# Patient Record
Sex: MALE | Race: WHITE | NOT HISPANIC OR LATINO | Employment: OTHER | URBAN - METROPOLITAN AREA
[De-identification: names, ages, dates, MRNs, and addresses within clinical notes are randomized per-mention and may not be internally consistent; named-entity substitution may affect disease eponyms.]

---

## 2024-05-11 ENCOUNTER — HOSPITAL ENCOUNTER (EMERGENCY)
Facility: HOSPITAL | Age: 74
Discharge: HOME | End: 2024-05-11
Attending: EMERGENCY MEDICINE
Payer: MEDICARE

## 2024-05-11 VITALS
DIASTOLIC BLOOD PRESSURE: 74 MMHG | OXYGEN SATURATION: 99 % | WEIGHT: 158 LBS | SYSTOLIC BLOOD PRESSURE: 166 MMHG | TEMPERATURE: 98.2 F | BODY MASS INDEX: 24.8 KG/M2 | RESPIRATION RATE: 18 BRPM | HEIGHT: 67 IN | HEART RATE: 77 BPM

## 2024-05-11 DIAGNOSIS — S01.81XA FACIAL LACERATION, INITIAL ENCOUNTER: Primary | ICD-10-CM

## 2024-05-11 PROCEDURE — 12011 RPR F/E/E/N/L/M 2.5 CM/<: CPT

## 2024-05-11 PROCEDURE — 99282 EMERGENCY DEPT VISIT SF MDM: CPT | Mod: 25

## 2024-05-11 PROCEDURE — 99283 EMERGENCY DEPT VISIT LOW MDM: CPT | Performed by: EMERGENCY MEDICINE

## 2024-05-11 ASSESSMENT — LIFESTYLE VARIABLES
HAVE YOU EVER FELT YOU SHOULD CUT DOWN ON YOUR DRINKING: NO
EVER HAD A DRINK FIRST THING IN THE MORNING TO STEADY YOUR NERVES TO GET RID OF A HANGOVER: NO
TOTAL SCORE: 0
EVER FELT BAD OR GUILTY ABOUT YOUR DRINKING: NO
HAVE PEOPLE ANNOYED YOU BY CRITICIZING YOUR DRINKING: NO

## 2024-05-11 ASSESSMENT — COLUMBIA-SUICIDE SEVERITY RATING SCALE - C-SSRS
1. IN THE PAST MONTH, HAVE YOU WISHED YOU WERE DEAD OR WISHED YOU COULD GO TO SLEEP AND NOT WAKE UP?: NO
2. HAVE YOU ACTUALLY HAD ANY THOUGHTS OF KILLING YOURSELF?: NO
6. HAVE YOU EVER DONE ANYTHING, STARTED TO DO ANYTHING, OR PREPARED TO DO ANYTHING TO END YOUR LIFE?: NO

## 2024-05-11 ASSESSMENT — PAIN DESCRIPTION - PROGRESSION: CLINICAL_PROGRESSION: NOT CHANGED

## 2024-05-11 ASSESSMENT — PAIN SCALES - GENERAL: PAINLEVEL_OUTOF10: 0 - NO PAIN

## 2024-05-11 ASSESSMENT — PAIN - FUNCTIONAL ASSESSMENT: PAIN_FUNCTIONAL_ASSESSMENT: 0-10

## 2024-05-12 NOTE — DISCHARGE INSTRUCTIONS
It is okay to wash the area with soap and water.  Do not use petroleum jelly, or any lotions as this will cause the surgical glue to come off.  Return to the emergency room for any increased redness, pain or swelling or signs of infection.  The glue will come off on its own and does not need to be removed.  The glue will usually remove itself or fall off within 7 to 10 days.

## 2024-05-12 NOTE — ED PROVIDER NOTES
HPI   Chief Complaint   Patient presents with    Facial Laceration     Nicked face shaving at 7:00pm  Patient cut tiny mole       This is a 74-year-old male who recently tested positive for COVID who comes to the emergency room when he cut his chin shaving.  He is on Coumadin, and the area that he cut has been bleeding and oozing all day.  Every time put on the mask, it knocks off the clot, and it starts bleeding again.  There is been no pain or swelling in the area.  He has no chest pain, no shortness of breath but has a mild cough and some mild weakness.  He does not want to be seen for his COVID as that feels fine.  He states the only reason he is here is because of the small laceration that he got from shaving on his chin.                          No data recorded                   Patient History   Past Medical History:   Diagnosis Date    Acute upper respiratory infection, unspecified 11/01/2013    Upper respiratory infection    Cough, unspecified 11/01/2013    Cough    Encounter for immunization 11/01/2013    Need for prophylactic vaccination and inoculation against influenza    Pain in unspecified shoulder 11/01/2013    Pain, joint, shoulder    Personal history of colonic polyps 04/22/2015    History of colonic polyps    Personal history of other diseases of the circulatory system 04/22/2015    History of congestive heart failure     Past Surgical History:   Procedure Laterality Date    COLONOSCOPY  11/20/2013    Colonoscopy (Fiberoptic)     No family history on file.  Social History     Tobacco Use    Smoking status: Never    Smokeless tobacco: Never   Vaping Use    Vaping status: Never Used   Substance Use Topics    Alcohol use: Never    Drug use: Never       Physical Exam   ED Triage Vitals [05/11/24 2305]   Temperature Heart Rate Respirations BP   36.8 °C (98.2 °F) 70 19 120/70      Pulse Ox Temp Source Heart Rate Source Patient Position   98 % Temporal Monitor Sitting      BP Location FiO2 (%)     Right  arm --       Physical Exam  Constitutional:       Appearance: Normal appearance.   HENT:      Head: Normocephalic.      Comments: Small 3 mm laceration vertically on the mid chin.  Oozing is controlled.  Dermabond utilized to close the wound.  Eyes:      Pupils: Pupils are equal, round, and reactive to light.   Cardiovascular:      Rate and Rhythm: Normal rate.      Pulses: Normal pulses.   Pulmonary:      Effort: Pulmonary effort is normal.      Breath sounds: Normal breath sounds.   Skin:     General: Skin is warm.   Neurological:      General: No focal deficit present.      Mental Status: He is alert.         ED Course & MDM   Diagnoses as of 05/11/24 2325   Facial laceration, initial encounter       Medical Decision Making  Dermabond was utilized to close the wound.  The patient was checked after 6 minutes, the wound is no longer bleeding and the bone has sealed.  They are instructed return to the emergency room for any worsening symptoms such as increased redness pain or swelling of the wound, or any worsening COVID symptoms such as chest pain, shortness of breath or worsening weakness or vomiting.        Procedure  Procedures     Charanjit Magdaleno DO  05/12/24 0224

## 2025-02-24 ENCOUNTER — APPOINTMENT (OUTPATIENT)
Dept: RADIOLOGY | Facility: HOSPITAL | Age: 75
End: 2025-02-24
Payer: MEDICARE

## 2025-02-24 ENCOUNTER — HOSPITAL ENCOUNTER (EMERGENCY)
Facility: HOSPITAL | Age: 75
Discharge: HOME | End: 2025-02-24
Attending: STUDENT IN AN ORGANIZED HEALTH CARE EDUCATION/TRAINING PROGRAM
Payer: MEDICARE

## 2025-02-24 VITALS
HEIGHT: 67 IN | SYSTOLIC BLOOD PRESSURE: 168 MMHG | HEART RATE: 84 BPM | BODY MASS INDEX: 22.29 KG/M2 | RESPIRATION RATE: 18 BRPM | TEMPERATURE: 98.1 F | DIASTOLIC BLOOD PRESSURE: 92 MMHG | WEIGHT: 142 LBS | OXYGEN SATURATION: 99 %
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
## 2025-02-24 LAB
ALBUMIN SERPL BCP-MCNC: 3.8 G/DL (ref 3.4–5)
ALP SERPL-CCNC: 41 U/L (ref 33–136)
ALT SERPL W P-5'-P-CCNC: 40 U/L (ref 10–52)
ANION GAP SERPL CALC-SCNC: 11 MMOL/L (ref 10–20)
APPEARANCE UR: CLEAR
APTT PPP: 28 SECONDS (ref 27–38)
AST SERPL W P-5'-P-CCNC: 37 U/L (ref 9–39)
BASOPHILS # BLD AUTO: 0.02 X10*3/UL (ref 0–0.1)
BASOPHILS NFR BLD AUTO: 0.2 %
BILIRUB SERPL-MCNC: 1 MG/DL (ref 0–1.2)
BILIRUB UR STRIP.AUTO-MCNC: NEGATIVE MG/DL
BUN SERPL-MCNC: 23 MG/DL (ref 6–23)
CALCIUM SERPL-MCNC: 9.8 MG/DL (ref 8.6–10.3)
CARDIAC TROPONIN I PNL SERPL HS: 20 NG/L (ref 0–20)
CARDIAC TROPONIN I PNL SERPL HS: 21 NG/L (ref 0–20)
CHLORIDE SERPL-SCNC: 104 MMOL/L (ref 98–107)
CO2 SERPL-SCNC: 29 MMOL/L (ref 21–32)
COLOR UR: NORMAL
CREAT SERPL-MCNC: 0.87 MG/DL (ref 0.5–1.3)
EGFRCR SERPLBLD CKD-EPI 2021: 90 ML/MIN/1.73M*2
EOSINOPHIL # BLD AUTO: 0.02 X10*3/UL (ref 0–0.4)
EOSINOPHIL NFR BLD AUTO: 0.2 %
ERYTHROCYTE [DISTWIDTH] IN BLOOD BY AUTOMATED COUNT: 13.3 % (ref 11.5–14.5)
ETHANOL SERPL-MCNC: <10 MG/DL
GLUCOSE SERPL-MCNC: 128 MG/DL (ref 74–99)
GLUCOSE UR STRIP.AUTO-MCNC: NORMAL MG/DL
HCT VFR BLD AUTO: 40.1 % (ref 41–52)
HGB BLD-MCNC: 12.9 G/DL (ref 13.5–17.5)
HOLD SPECIMEN: NORMAL
IMM GRANULOCYTES # BLD AUTO: 0.04 X10*3/UL (ref 0–0.5)
IMM GRANULOCYTES NFR BLD AUTO: 0.5 % (ref 0–0.9)
INR PPP: 1.3 (ref 0.9–1.1)
KETONES UR STRIP.AUTO-MCNC: NEGATIVE MG/DL
LEUKOCYTE ESTERASE UR QL STRIP.AUTO: NEGATIVE
LYMPHOCYTES # BLD AUTO: 1.37 X10*3/UL (ref 0.8–3)
LYMPHOCYTES NFR BLD AUTO: 15.8 %
MCH RBC QN AUTO: 31.8 PG (ref 26–34)
MCHC RBC AUTO-ENTMCNC: 32.2 G/DL (ref 32–36)
MCV RBC AUTO: 99 FL (ref 80–100)
MONOCYTES # BLD AUTO: 0.72 X10*3/UL (ref 0.05–0.8)
MONOCYTES NFR BLD AUTO: 8.3 %
NEUTROPHILS # BLD AUTO: 6.49 X10*3/UL (ref 1.6–5.5)
NEUTROPHILS NFR BLD AUTO: 75 %
NITRITE UR QL STRIP.AUTO: NEGATIVE
NRBC BLD-RTO: 0 /100 WBCS (ref 0–0)
PH UR STRIP.AUTO: 6.5 [PH]
PLATELET # BLD AUTO: 196 X10*3/UL (ref 150–450)
POTASSIUM SERPL-SCNC: 4.4 MMOL/L (ref 3.5–5.3)
PROT SERPL-MCNC: 6.1 G/DL (ref 6.4–8.2)
PROT UR STRIP.AUTO-MCNC: NEGATIVE MG/DL
PROTHROMBIN TIME: 14.4 SECONDS (ref 9.8–12.8)
RBC # BLD AUTO: 4.06 X10*6/UL (ref 4.5–5.9)
RBC # UR STRIP.AUTO: NEGATIVE MG/DL
SODIUM SERPL-SCNC: 140 MMOL/L (ref 136–145)
SP GR UR STRIP.AUTO: 1.01
UROBILINOGEN UR STRIP.AUTO-MCNC: NORMAL MG/DL
WBC # BLD AUTO: 8.7 X10*3/UL (ref 4.4–11.3)

## 2025-02-24 PROCEDURE — 82077 ASSAY SPEC XCP UR&BREATH IA: CPT | Performed by: STUDENT IN AN ORGANIZED HEALTH CARE EDUCATION/TRAINING PROGRAM

## 2025-02-24 PROCEDURE — 81003 URINALYSIS AUTO W/O SCOPE: CPT | Performed by: STUDENT IN AN ORGANIZED HEALTH CARE EDUCATION/TRAINING PROGRAM

## 2025-02-24 PROCEDURE — 99291 CRITICAL CARE FIRST HOUR: CPT | Performed by: STUDENT IN AN ORGANIZED HEALTH CARE EDUCATION/TRAINING PROGRAM

## 2025-02-24 PROCEDURE — 84484 ASSAY OF TROPONIN QUANT: CPT | Performed by: STUDENT IN AN ORGANIZED HEALTH CARE EDUCATION/TRAINING PROGRAM

## 2025-02-24 PROCEDURE — 99285 EMERGENCY DEPT VISIT HI MDM: CPT | Mod: 25

## 2025-02-24 PROCEDURE — 70450 CT HEAD/BRAIN W/O DYE: CPT

## 2025-02-24 PROCEDURE — 71045 X-RAY EXAM CHEST 1 VIEW: CPT

## 2025-02-24 PROCEDURE — 72170 X-RAY EXAM OF PELVIS: CPT | Performed by: RADIOLOGY

## 2025-02-24 PROCEDURE — 85610 PROTHROMBIN TIME: CPT | Performed by: EMERGENCY MEDICINE

## 2025-02-24 PROCEDURE — 85025 COMPLETE CBC W/AUTO DIFF WBC: CPT | Performed by: STUDENT IN AN ORGANIZED HEALTH CARE EDUCATION/TRAINING PROGRAM

## 2025-02-24 PROCEDURE — 80053 COMPREHEN METABOLIC PANEL: CPT | Performed by: STUDENT IN AN ORGANIZED HEALTH CARE EDUCATION/TRAINING PROGRAM

## 2025-02-24 PROCEDURE — 72170 X-RAY EXAM OF PELVIS: CPT

## 2025-02-24 PROCEDURE — 72125 CT NECK SPINE W/O DYE: CPT

## 2025-02-24 PROCEDURE — 70450 CT HEAD/BRAIN W/O DYE: CPT | Performed by: RADIOLOGY

## 2025-02-24 PROCEDURE — 36415 COLL VENOUS BLD VENIPUNCTURE: CPT | Performed by: STUDENT IN AN ORGANIZED HEALTH CARE EDUCATION/TRAINING PROGRAM

## 2025-02-24 PROCEDURE — 71045 X-RAY EXAM CHEST 1 VIEW: CPT | Performed by: RADIOLOGY

## 2025-02-24 ASSESSMENT — LIFESTYLE VARIABLES
EVER HAD A DRINK FIRST THING IN THE MORNING TO STEADY YOUR NERVES TO GET RID OF A HANGOVER: NO
HAVE YOU EVER FELT YOU SHOULD CUT DOWN ON YOUR DRINKING: NO
HAVE PEOPLE ANNOYED YOU BY CRITICIZING YOUR DRINKING: NO
TOTAL SCORE: 0
EVER FELT BAD OR GUILTY ABOUT YOUR DRINKING: NO

## 2025-02-24 ASSESSMENT — PAIN DESCRIPTION - LOCATION: LOCATION: NECK

## 2025-02-24 ASSESSMENT — COLUMBIA-SUICIDE SEVERITY RATING SCALE - C-SSRS
2. HAVE YOU ACTUALLY HAD ANY THOUGHTS OF KILLING YOURSELF?: NO
6. HAVE YOU EVER DONE ANYTHING, STARTED TO DO ANYTHING, OR PREPARED TO DO ANYTHING TO END YOUR LIFE?: NO
1. IN THE PAST MONTH, HAVE YOU WISHED YOU WERE DEAD OR WISHED YOU COULD GO TO SLEEP AND NOT WAKE UP?: NO

## 2025-02-24 ASSESSMENT — PAIN - FUNCTIONAL ASSESSMENT: PAIN_FUNCTIONAL_ASSESSMENT: 0-10

## 2025-02-24 ASSESSMENT — PAIN SCALES - GENERAL
PAINLEVEL_OUTOF10: 0 - NO PAIN
PAINLEVEL_OUTOF10: 0 - NO PAIN
PAINLEVEL_OUTOF10: 5 - MODERATE PAIN

## 2025-02-24 NOTE — ED PROVIDER NOTES
EMERGENCY DEPARTMENT ENCOUNTER      Pt Name: Nahid Gastelum  MRN: [id number]  Birthdate 1950  Date of evaluation: 2/24/2025  Provider: Leander Jeter DO    CHIEF COMPLAINT       Chief Complaint   Patient presents with    Fall     Pt slipped and fell while getting out of shower, denies head/neck injury. Reports weakness for several days     HISTORY OF PRESENT ILLNESS    Nahid Gastelum is a 74 y.o. year old male who presents to the ER for fall 1 Coumadin.  He reports that he has had 1 week of increased urinary urgency, weakness without dysuria.  Today after out of bed he felt generally weak, reports falling by slipping on ice and landing on his butt, he did not hit his head or pass out.  He is not experiencing chest pain, shortness of breath, abdominal pain, neck pain, pain to his arms or legs.  EMS did report he had report of neck pain, placed him in cervical collar prior to arrival.  PMH A-fib on Coumadin     PAST MEDICAL HISTORY     Past Medical History:   Diagnosis Date    Acute upper respiratory infection, unspecified 11/01/2013    Upper respiratory infection    Cough, unspecified 11/01/2013    Cough    Encounter for immunization 11/01/2013    Need for prophylactic vaccination and inoculation against influenza    Pain in unspecified shoulder 11/01/2013    Pain, joint, shoulder    Personal history of colonic polyps 04/22/2015    History of colonic polyps    Personal history of other diseases of the circulatory system 04/22/2015    History of congestive heart failure     CURRENT MEDICATIONS       Previous Medications    No medications on file     SURGICAL HISTORY       Past Surgical History:   Procedure Laterality Date    COLONOSCOPY  11/20/2013    Colonoscopy (Fiberoptic)     ALLERGIES     Sulfamethoxazole and Sulfamethoxazole-trimethoprim  FAMILY HISTORY     No family history on file.  SOCIAL HISTORY       Social History     Tobacco Use    Smoking status: Never    Smokeless tobacco: Never   Vaping Use     Vaping status: Never Used   Substance Use Topics    Alcohol use: Never    Drug use: Never     PHYSICAL EXAM  (up to 7 for level 4, 8 or more for level 5)     ED Triage Vitals   Temp Pulse Resp BP   -- -- -- --      SpO2 Temp src Heart Rate Source Patient Position   -- -- -- --      BP Location FiO2 (%)     -- --       Physical Exam  Vitals and nursing note reviewed.   Constitutional:       General: He is not in acute distress.     Appearance: Normal appearance. He is not ill-appearing, toxic-appearing or diaphoretic.   HENT:      Head: Normocephalic and atraumatic. No raccoon eyes, Paulino's sign, contusion or laceration.      Jaw: No trismus or malocclusion.      Right Ear: External ear normal.      Left Ear: External ear normal.      Mouth/Throat:      Mouth: Mucous membranes are moist.      Pharynx: Oropharynx is clear.   Eyes:      Extraocular Movements: Extraocular movements intact.      Pupils: Pupils are equal, round, and reactive to light.   Neck:      Trachea: No tracheal deviation.   Cardiovascular:      Rate and Rhythm: Normal rate and regular rhythm.      Pulses: Normal pulses.           Radial pulses are 2+ on the right side and 2+ on the left side.        Dorsalis pedis pulses are 2+ on the right side and 2+ on the left side.   Pulmonary:      Effort: Pulmonary effort is normal. No respiratory distress.      Breath sounds: No stridor. No wheezing, rhonchi or rales.   Chest:      Chest wall: No tenderness.   Abdominal:      General: Abdomen is flat.      Palpations: Abdomen is soft. There is no mass.      Tenderness: There is no abdominal tenderness. There is no guarding or rebound.   Musculoskeletal:         General: No tenderness or signs of injury.      Right shoulder: No deformity or tenderness. Normal range of motion.      Left shoulder: No deformity or tenderness. Normal range of motion.      Right upper arm: No deformity or tenderness.      Left upper arm: No deformity or tenderness.      Right  elbow: No deformity. Normal range of motion. No tenderness.      Left elbow: No deformity. Normal range of motion. No tenderness.      Right forearm: No deformity or tenderness.      Left forearm: No deformity or tenderness.      Right wrist: No deformity, tenderness or snuff box tenderness. Normal range of motion.      Left wrist: No deformity, tenderness or snuff box tenderness. Normal range of motion.      Right hand: No tenderness. Normal range of motion.      Left hand: No tenderness. Normal range of motion.      Cervical back: Neck supple. No deformity or tenderness.      Thoracic back: No deformity or tenderness.      Lumbar back: No deformity or tenderness.      Right hip: No deformity or tenderness. Normal range of motion.      Left hip: No deformity or tenderness. Normal range of motion.      Right upper leg: No deformity or tenderness.      Left upper leg: No deformity or tenderness.      Right knee: No deformity. Normal range of motion. No tenderness.      Left knee: No deformity. Normal range of motion. No tenderness.      Right lower leg: No deformity or tenderness. No edema.      Left lower leg: No deformity or tenderness. No edema.      Right ankle: No deformity. No tenderness.      Left ankle: No deformity. No tenderness.      Right foot: Normal range of motion. No deformity or tenderness.      Left foot: Normal range of motion. No deformity or tenderness.   Skin:     General: Skin is warm and dry.      Capillary Refill: Capillary refill takes less than 2 seconds.      Coloration: Skin is not jaundiced or pale.      Findings: No bruising, lesion, petechiae, rash or wound.   Neurological:      General: No focal deficit present.      Mental Status: He is alert.      Cranial Nerves: No cranial nerve deficit.      Sensory: No sensory deficit.      Motor: No weakness.      Coordination: Coordination normal.        DIAGNOSTIC RESULTS   LABS:  Labs Reviewed   CBC WITH AUTO DIFFERENTIAL - Abnormal        Result Value    WBC 8.7      nRBC 0.0      RBC 4.06 (*)     Hemoglobin 12.9 (*)     Hematocrit 40.1 (*)     MCV 99      MCH 31.8      MCHC 32.2      RDW 13.3      Platelets 196      Neutrophils % 75.0      Immature Granulocytes %, Automated 0.5      Lymphocytes % 15.8      Monocytes % 8.3      Eosinophils % 0.2      Basophils % 0.2      Neutrophils Absolute 6.49 (*)     Immature Granulocytes Absolute, Automated 0.04      Lymphocytes Absolute 1.37      Monocytes Absolute 0.72      Eosinophils Absolute 0.02      Basophils Absolute 0.02     COMPREHENSIVE METABOLIC PANEL - Abnormal    Glucose 128 (*)     Sodium 140      Potassium 4.4      Chloride 104      Bicarbonate 29      Anion Gap 11      Urea Nitrogen 23      Creatinine 0.87      eGFR 90      Calcium 9.8      Albumin 3.8      Alkaline Phosphatase 41      Total Protein 6.1 (*)     AST 37      Bilirubin, Total 1.0      ALT 40     TROPONIN I, HIGH SENSITIVITY - Abnormal    Troponin I, High Sensitivity 21 (*)     Narrative:     Less than 99th percentile of normal range cutoff-  Female and children under 18 years old <14 ng/L; Male <21 ng/L: Negative  Repeat testing should be performed if clinically indicated.     Female and children under 18 years old 14-50 ng/L; Male 21-50 ng/L:  Consistent with possible cardiac damage and possible increased clinical   risk. Serial measurements may help to assess extent of myocardial damage.     >50 ng/L: Consistent with cardiac damage, increased clinical risk and  myocardial infarction. Serial measurements may help assess extent of   myocardial damage.      NOTE: Children less than 1 year old may have higher baseline troponin   levels and results should be interpreted in conjunction with the overall   clinical context.     NOTE: Troponin I testing is performed using a different   testing methodology at [facility] than at other   [facility] hospitals. Direct result comparisons should only   be made within the same method.    COAGULATION SCREEN - Abnormal    Protime 14.4 (*)     INR 1.3 (*)     aPTT 28      Narrative:     The APTT is no longer used for monitoring Unfractionated Heparin Therapy. For monitoring Heparin Therapy, use the Heparin Assay.   ALCOHOL - Normal    Alcohol <10     URINALYSIS WITH REFLEX CULTURE AND MICROSCOPIC - Normal    Color, Urine Light-Yellow      Appearance, Urine Clear      Specific Gravity, Urine 1.012      pH, Urine 6.5      Protein, Urine NEGATIVE      Glucose, Urine Normal      Blood, Urine NEGATIVE      Ketones, Urine NEGATIVE      Bilirubin, Urine NEGATIVE      Urobilinogen, Urine Normal      Nitrite, Urine NEGATIVE      Leukocyte Esterase, Urine NEGATIVE     TROPONIN I, HIGH SENSITIVITY - Normal    Troponin I, High Sensitivity 20      Narrative:     Less than 99th percentile of normal range cutoff-  Female and children under 18 years old <14 ng/L; Male <21 ng/L: Negative  Repeat testing should be performed if clinically indicated.     Female and children under 18 years old 14-50 ng/L; Male 21-50 ng/L:  Consistent with possible cardiac damage and possible increased clinical   risk. Serial measurements may help to assess extent of myocardial damage.     >50 ng/L: Consistent with cardiac damage, increased clinical risk and  myocardial infarction. Serial measurements may help assess extent of   myocardial damage.      NOTE: Children less than 1 year old may have higher baseline troponin   levels and results should be interpreted in conjunction with the overall   clinical context.     NOTE: Troponin I testing is performed using a different   testing methodology at [facility] than at other   [facility]. Direct result comparisons should only   be made within the same method.   URINALYSIS WITH REFLEX CULTURE AND MICROSCOPIC    Narrative:     The following orders were created for panel order Urinalysis with Reflex Culture and Microscopic.  Procedure                                Abnormality         Status                     ---------                               -----------         ------                     Urinalysis with Reflex C...[[id number]]  Normal              Final result               Extra Urine Gray Tube[[id number]]                            In process                   Please view results for these tests on the individual orders.   EXTRA URINE GRAY TUBE     All other labs were within normal range or not returned as of this dictation.  Imaging  XR chest 1 view   Final Result   Cardiomegaly                  MACRO:   None        Signed by: Catrachita Ferro 2/24/2025 9:37 AM   Dictation workstation:   [id number]      XR pelvis 1-2 views   Final Result   No acute fracture or dislocation.        Degenerative changes of bilateral hips.        Degenerative changes of bilateral SI joints and lower lumbar spine.             MACRO:   None        Signed by: Catrachita Ferro 2/24/2025 9:31 AM   Dictation workstation:   [id number]      CT head W O contrast trauma protocol   Final Result   No evidence of an acute intracranial process.        MACRO:   None.        Signed by: Fred Huitron 2/24/2025 7:51 AM   Dictation workstation:   [id number]      CT cervical spine wo IV contrast   Final Result   No evidence of an acute fracture or subluxation.  Degenerative   changes.        MACRO:   None.        Signed by: Fred Huitron 2/24/2025 7:53 AM   Dictation workstation:   [id number]         Procedure  Critical Care    Performed by: Mejia Cabrera DO  Authorized by: Mejia Cabrera DO    Critical care provider statement:     Critical care time (minutes):  35    Critical care time was exclusive of:  Separately billable procedures and treating other patients and teaching time    Critical care was necessary to treat or prevent imminent or life-threatening deterioration of the following conditions:  Trauma    Critical care was time spent personally by me on the following activities:  Ordering and performing  "treatments and interventions, ordering and review of laboratory studies, ordering and review of radiographic studies, pulse oximetry, re-evaluation of patient's condition, review of old charts, obtaining history from patient or surrogate, evaluation of patient's response to treatment, development of treatment plan with patient or surrogate and examination of patient    EMERGENCY DEPARTMENT COURSE/MDM:   Medical Decision Making    Vitals:    Vitals:    02/24/25 0725 02/24/25 0748 02/24/25 0800 02/24/25 0907   BP: 92/61 (!) 204/81 (!) 166/93 (!) 168/92   BP Location: Right arm Right arm  Right arm   Patient Position: Lying Sitting  Sitting   Pulse: (!) 101 98 90 84   Resp: 18 18  18   Temp: 36.7 °C (98.1 °F)      TempSrc: Temporal      SpO2: 100% 99% 98% 99%   Weight: 64.4 kg (142 lb)      Height: 1.702 m (5' 7\")        Nahid Gastelum is a male 74 y.o. who presents to the ER for fall on Coumadin. On arrival the patients vital signs were: Afebrile, Tachycardic, Normotensive, Regular RR, and Normoxic on room air. History obtained from: patient.  Patient reported neck pain to EMS, collar was placed by them prior to arrival.  Arrived per QB as head impact on anticoagulation, on arrival primary survey intact, secondary survey did not have any sites of tenderness so basic per trauma protocol started including CBC, CMP, troponin, alcohol, CT head and C-spine, chest and pelvis x-ray.  Given patient is on Coumadin, INR checked.  Given reported weakness, increased urinary urgency plan for UA as well.  No open deformed injuries, no skin breakage, tetanus shot not indicated.  Patient not reporting pain, analgesia deferred.    Patient will be discharged home after negative traumatic injury workup.  Subtherapeutic INR which I advised patient to continue taking his Coumadin at home and have a recheck by his outpatient team within the next week.  Do not believe the dosage needs to be changed as he just recently restarted the " medication.  No sign of infection in the urine.  Patient made aware of the possibility of concussion although he is not currently exhibiting any signs of TBI.  Discharged in stable condition with PCP follow-up the patient does feel able to return home and take care of himself.    ED Course as of 02/24/25 1215   Mon Feb 24, 2025   0730 Nursing advised to stand down on trauma activation as patient denies hitting head and EMS denies any report of patient having had a head injury. [TL]   0756 Subtherapeutic INR in the setting of recently holding Coumadin for procedure. [TL]   0948 No acute traumatic injury noted on radiology.  Repeat troponin to be obtained given borderline troponin of 21.  If not uptrending I do believe the patient would likely be appropriate for discharge home if he is able to ambulate. [TL]   1054 Comprehensive Metabolic Panel(!)  Normoglycemic, no acute electrolyte or hepatorenal abnormality [CB]   1054 Troponin I, High Sensitivity(!): 21  Elevated, will repeat [CB]   1054 Urinalysis with Reflex Culture and Microscopic  No bacteria, nitrite, or leukocyte esterase to suggest UTI. No ketonuria. No hematuria. No glucosuria. [CB]   1054 Alcohol: <10  Sober [CB]   1054 CBC and Auto Differential(!)  Normocytic anemia, no acute leukocytosis leukopenia thrombocytosis or thrombocytopenia [CB]   1054 CT head W O contrast trauma protocol  No evidence of an acute intracranial process. [CB]   1055 CT cervical spine wo IV contrast  No evidence of an acute fracture or subluxation.  Degenerative  changes   [CB]   1055 XR chest 1 view  Cardiomegaly [CB]   1055 XR pelvis 1-2 views  No acute fracture or dislocation.      Degenerative changes of bilateral hips.      Degenerative changes of bilateral SI joints and lower lumbar spine.   [CB]   1118 Passed road test [CB]   1134 Repeat troponin within normal limits. [TL]   1214 Troponin I, High Sensitivity: 20  Not elevated up to 15 points relative to initial troponin,  doubt doubt acs or myopericarditis [CB]      ED Course User Index  [CB] Leander Jteer DO  [TL] Mejia Cabrera DO         Diagnoses as of 02/24/25 1215   Fall, initial encounter       External Records Reviewed: I reviewed recent and relevant outside records including inpatient notes, outpatient records      Shared decision making for disposition  Patient and/or patient´s representative was counseled regarding labs, imaging, likely diagnosis. All questions were answered. Recommendation was made   for discharge home. The patient agreed and was discharged home in stable condition with appropriate relevant educational materials. Return precautions were provided which included worsening headache, vomiting, fever of 38C (100.4) or higher, vision changes, confusion, loss of motion or feeling in your arms or legs, loss of control of your urine or stool, neck pain, fainting, seizure, or any new or worsening symptoms. .       Follow-up:  Manpreet Perez MD  [address] [facility] [address]  [facility] [id number] [phone]              Final Impression:   1. Fall, initial encounter          Please excuse any misspellings or unintended errors related to the Dragon speech recognition software used to dictate this note.    I reviewed the case with the attending ED physician. The attending ED physician agrees with the plan.      Leander Jeter DO  Resident  02/24/25 1120       Leander Jeter DO  Resident  02/24/25 1215    I performed a history and physical examination of the patient and discussed his management with the resident physician.  I agree with the history, physical, assessment, and plan of care, with the following exceptions:   None    I was present for key and critical portions of the following procedures: Critical care  Time Spent in Critical Care of the patient: 35  Time spent in discussions with the patient and family: 30    DO Mejia Oneal DO  02/24/25 8123

## 2025-02-24 NOTE — DISCHARGE INSTRUCTIONS
Please return to the ER or seek immediate medical attention if you experience worsening headache, vomiting, fever of 38C (100.4) or higher, vision changes, confusion, loss of motion or feeling in your arms or legs, loss of control of your urine or stool, neck pain, fainting, seizure, or any new or worsening symptoms.     You are welcome back any time. Thank you for entrusting your care to us, I hope we made your visit as pleasant as possible. Wishing you well!    Dr. Jeter

## 2025-03-10 ENCOUNTER — TELEPHONE (OUTPATIENT)
Dept: NEUROSURGERY | Facility: HOSPITAL | Age: 75
End: 2025-03-10

## 2025-03-10 ENCOUNTER — APPOINTMENT (OUTPATIENT)
Dept: NEUROSURGERY | Facility: CLINIC | Age: 75
End: 2025-03-10
Payer: MEDICARE

## 2025-03-10 VITALS
HEIGHT: 67 IN | RESPIRATION RATE: 18 BRPM | SYSTOLIC BLOOD PRESSURE: 104 MMHG | BODY MASS INDEX: 22.28 KG/M2 | WEIGHT: 141.98 LBS | HEART RATE: 82 BPM | DIASTOLIC BLOOD PRESSURE: 66 MMHG

## 2025-03-10 DIAGNOSIS — S06.9XAA HYDROCEPHALUS EX VACUO DUE TO BRAIN INJURY (MULTI): ICD-10-CM

## 2025-03-10 DIAGNOSIS — G91.3 HYDROCEPHALUS EX VACUO DUE TO BRAIN INJURY (MULTI): ICD-10-CM

## 2025-03-10 DIAGNOSIS — G91.2 NPH (NORMAL PRESSURE HYDROCEPHALUS) (MULTI): Primary | ICD-10-CM

## 2025-03-10 DIAGNOSIS — G91.2 (IDIOPATHIC) NORMAL PRESSURE HYDROCEPHALUS (MULTI): ICD-10-CM

## 2025-03-10 PROCEDURE — 99202 OFFICE O/P NEW SF 15 MIN: CPT | Performed by: NEUROLOGICAL SURGERY

## 2025-03-10 PROCEDURE — 1126F AMNT PAIN NOTED NONE PRSNT: CPT | Performed by: NEUROLOGICAL SURGERY

## 2025-03-10 RX ORDER — DONEPEZIL HYDROCHLORIDE 10 MG/1
TABLET, FILM COATED ORAL
COMMUNITY
Start: 2024-02-27

## 2025-03-10 RX ORDER — TORSEMIDE 10 MG/1
1 TABLET ORAL DAILY
COMMUNITY
Start: 2023-12-05

## 2025-03-10 RX ORDER — MINOCYCLINE HYDROCHLORIDE 100 MG/1
CAPSULE ORAL
COMMUNITY
Start: 2016-08-10

## 2025-03-10 RX ORDER — TAMSULOSIN HYDROCHLORIDE 0.4 MG/1
0.4 CAPSULE ORAL
COMMUNITY
Start: 2023-11-21

## 2025-03-10 RX ORDER — OMEPRAZOLE 20 MG/1
1 TABLET, DELAYED RELEASE ORAL DAILY
COMMUNITY
Start: 2013-11-20

## 2025-03-10 RX ORDER — WARFARIN 4 MG/1
4 TABLET ORAL
COMMUNITY
Start: 2013-11-20

## 2025-03-10 ASSESSMENT — ENCOUNTER SYMPTOMS
BACK PAIN: 1
CONFUSION: 1
CARDIOVASCULAR NEGATIVE: 1
LIGHT-HEADEDNESS: 1
RESPIRATORY NEGATIVE: 1
DEPRESSION: 1
NECK PAIN: 1
OCCASIONAL FEELINGS OF UNSTEADINESS: 1
WEAKNESS: 1
DIARRHEA: 1
ALLERGIC/IMMUNOLOGIC NEGATIVE: 1
EYES NEGATIVE: 1
DIZZINESS: 1
BRUISES/BLEEDS EASILY: 1
LOSS OF SENSATION IN FEET: 0
FATIGUE: 1
FREQUENCY: 1
ENDOCRINE NEGATIVE: 1

## 2025-03-10 ASSESSMENT — PAIN SCALES - GENERAL: PAINLEVEL_OUTOF10: 0-NO PAIN

## 2025-03-10 NOTE — TELEPHONE ENCOUNTER
Pt was seen today and referred for NPH testing.  When they called to make an appt, they were told that he needs a CT scan prior to appt.  Please put in order and inform pt.

## 2025-03-10 NOTE — PROGRESS NOTES
"Having memory loss, urinary incontinent and unsteady gait. This has been since Oct. Has fallen 5 times. Also shaking.     74-year-old man presents for evaluation of cerebral ventriculomegaly.  He has had 6 months of worsening balance and memory problems.  He has also had urinary incontinence.  He has fallen multiple times.  He has been ambulating with a walker.  The patient is also developed a fine tremor.    Review of Systems   Constitutional:  Positive for fatigue.   HENT: Negative.     Eyes: Negative.    Respiratory: Negative.     Cardiovascular: Negative.    Gastrointestinal:  Positive for diarrhea.   Endocrine: Negative.    Genitourinary:  Positive for frequency and urgency.   Musculoskeletal:  Positive for back pain, gait problem and neck pain.   Skin: Negative.    Allergic/Immunologic: Negative.    Neurological:  Positive for dizziness, weakness and light-headedness.   Hematological:  Bruises/bleeds easily.   Psychiatric/Behavioral:  Positive for confusion.        Visit Vitals  /66 (BP Location: Left arm, Patient Position: Sitting, BP Cuff Size: Adult)   Pulse 82   Resp 18   Ht 1.702 m (5' 7\")   Wt 64.4 kg (141 lb 15.6 oz)   BMI 22.24 kg/m²   Smoking Status Never   BSA 1.74 m²         No current outpatient medications on file.      Objective   Neurological Exam    On physical examination, the patient is alert and interactive.  His language comprehension production are intact.  Extract movements are intact.  Face moves symmetrically.  He moves all extremities symmetrically.  An MRI of the right that I personally reviewed    Demonstrates moderate diffuse atrophy and ventriculomegaly.    The patient's clinical presentation and imaging may be consistent with normal pressure hydrocephalus.  To address this, I have offered the patient a high-volume lumbar puncture with neuropsychological testing to see if removal of spinal fluid improves his condition.  If it does, he may be a candidate for surgery for " placement of a permanent shunt.  The patient will need to stop his warfarin prior to the testing.

## 2025-03-19 ENCOUNTER — HOSPITAL ENCOUNTER (OUTPATIENT)
Dept: RADIOLOGY | Facility: HOSPITAL | Age: 75
Discharge: HOME | End: 2025-03-19
Payer: MEDICARE

## 2025-03-19 DIAGNOSIS — G91.2 NPH (NORMAL PRESSURE HYDROCEPHALUS) (MULTI): ICD-10-CM

## 2025-03-19 PROCEDURE — 70450 CT HEAD/BRAIN W/O DYE: CPT

## 2025-04-07 LAB
NON-UH HIE APTT PATIENT: 32.9 SECONDS (ref 26–36)
NON-UH HIE HCT: 38.1 % (ref 41–52)
NON-UH HIE HGB: 12.7 G/DL (ref 13.5–17.5)
NON-UH HIE INR: 1.2
NON-UH HIE INSTR WBC ND: 5.3
NON-UH HIE MCH: 32.1 PG (ref 27–34)
NON-UH HIE MCHC: 33.2 G/DL (ref 32–37)
NON-UH HIE MCV: 96.6 FL (ref 80–100)
NON-UH HIE MPV: 7.5 FL (ref 7.4–10.4)
NON-UH HIE PLATELET: 213 X10 (ref 150–450)
NON-UH HIE PROTIME PATIENT: 13.2 SECONDS (ref 9.8–12.4)
NON-UH HIE RBC: 3.94 X10 (ref 4.7–6.1)
NON-UH HIE RDW: 14 % (ref 11.5–14.5)
NON-UH HIE WBC: 5.3 X10 (ref 4.5–11)

## 2025-04-09 ENCOUNTER — HOSPITAL ENCOUNTER (OUTPATIENT)
Dept: RADIOLOGY | Facility: HOSPITAL | Age: 75
Discharge: HOME | End: 2025-04-09
Payer: MEDICARE

## 2025-04-09 ENCOUNTER — APPOINTMENT (OUTPATIENT)
Dept: PSYCHOLOGY | Facility: HOSPITAL | Age: 75
End: 2025-04-09
Payer: MEDICARE

## 2025-04-09 ENCOUNTER — CLINICAL SUPPORT (OUTPATIENT)
Dept: PSYCHOLOGY | Facility: HOSPITAL | Age: 75
End: 2025-04-09
Payer: MEDICARE

## 2025-04-09 VITALS
HEART RATE: 72 BPM | OXYGEN SATURATION: 100 % | SYSTOLIC BLOOD PRESSURE: 162 MMHG | RESPIRATION RATE: 16 BRPM | DIASTOLIC BLOOD PRESSURE: 90 MMHG

## 2025-04-09 DIAGNOSIS — R26.9 GAIT DISTURBANCE: ICD-10-CM

## 2025-04-09 DIAGNOSIS — N39.41 URGE INCONTINENCE OF URINE: ICD-10-CM

## 2025-04-09 DIAGNOSIS — G91.2 NPH (NORMAL PRESSURE HYDROCEPHALUS) (MULTI): ICD-10-CM

## 2025-04-09 DIAGNOSIS — G93.89 CEREBRAL VENTRICULOMEGALY: ICD-10-CM

## 2025-04-09 DIAGNOSIS — R35.0 URINARY FREQUENCY: ICD-10-CM

## 2025-04-09 DIAGNOSIS — R41.3 MEMORY DEFICIT: Primary | ICD-10-CM

## 2025-04-09 DIAGNOSIS — R39.15 URINARY URGENCY: ICD-10-CM

## 2025-04-09 DIAGNOSIS — R41.843 PSYCHOMOTOR DEFICIT: ICD-10-CM

## 2025-04-09 LAB
APPEARANCE CSF: CLEAR
APPEARANCE CSF: CLEAR
BASOPHILS NFR CSF MANUAL: 0 %
BASOPHILS NFR CSF MANUAL: 0 %
BLASTS CSF MANUAL: 0 %
BLASTS CSF MANUAL: 0 %
COLOR CSF: COLORLESS
COLOR CSF: COLORLESS
COLOR SPUN CSF: COLORLESS
COLOR SPUN CSF: COLORLESS
EOSINOPHIL NFR CSF MANUAL: 0 %
EOSINOPHIL NFR CSF MANUAL: 0 %
GLUCOSE CSF-MCNC: 72 MG/DL (ref 40–70)
IMM GRANULOCYTES NFR CSF: 0 %
IMM GRANULOCYTES NFR CSF: 0 %
LYMPHOCYTES NFR CSF MANUAL: 38 % (ref 28–96)
LYMPHOCYTES NFR CSF MANUAL: 77 % (ref 28–96)
MONOS+MACROS NFR CSF MANUAL: 23 % (ref 16–56)
MONOS+MACROS NFR CSF MANUAL: 63 % (ref 16–56)
NEUTS SEG NFR CSF MANUAL: 0 % (ref 0–5)
NEUTS SEG NFR CSF MANUAL: 0 % (ref 0–5)
OTHER CELLS NFR CSF MANUAL: 0 %
OTHER CELLS NFR CSF MANUAL: 0 %
PLASMA CELLS NFR CSF MICRO: 0 %
PLASMA CELLS NFR CSF MICRO: 0 %
PROT CSF-MCNC: 36 MG/DL (ref 15–45)
RBC # CSF AUTO: 120 /UL (ref 0–5)
RBC # CSF AUTO: 20 /UL (ref 0–5)
TOTAL CELLS COUNTED CSF: 22
TOTAL CELLS COUNTED CSF: 8
TUBE # CSF: ABNORMAL
TUBE # CSF: ABNORMAL
WBC # CSF AUTO: 1 /UL (ref 1–5)
WBC # CSF AUTO: 1 /UL (ref 1–5)

## 2025-04-09 PROCEDURE — 7100000009 HC PHASE TWO TIME - INITIAL BASE CHARGE

## 2025-04-09 PROCEDURE — 96138 PSYCL/NRPSYC TECH 1ST: CPT | Performed by: CLINICAL NEUROPSYCHOLOGIST

## 2025-04-09 PROCEDURE — 96133 NRPSYC TST EVAL PHYS/QHP EA: CPT | Mod: AH | Performed by: CLINICAL NEUROPSYCHOLOGIST

## 2025-04-09 PROCEDURE — 96116 NUBHVL XM PHYS/QHP 1ST HR: CPT | Mod: AH | Performed by: CLINICAL NEUROPSYCHOLOGIST

## 2025-04-09 PROCEDURE — 96132 NRPSYC TST EVAL PHYS/QHP 1ST: CPT | Mod: AH | Performed by: CLINICAL NEUROPSYCHOLOGIST

## 2025-04-09 PROCEDURE — 96133 NRPSYC TST EVAL PHYS/QHP EA: CPT | Performed by: CLINICAL NEUROPSYCHOLOGIST

## 2025-04-09 PROCEDURE — 7100000010 HC PHASE TWO TIME - EACH INCREMENTAL 1 MINUTE

## 2025-04-09 PROCEDURE — 62328 DX LMBR SPI PNXR W/FLUOR/CT: CPT

## 2025-04-09 PROCEDURE — 96138 PSYCL/NRPSYC TECH 1ST: CPT | Mod: AH | Performed by: CLINICAL NEUROPSYCHOLOGIST

## 2025-04-09 PROCEDURE — 2500000001 HC RX 250 WO HCPCS SELF ADMINISTERED DRUGS (ALT 637 FOR MEDICARE OP): Performed by: STUDENT IN AN ORGANIZED HEALTH CARE EDUCATION/TRAINING PROGRAM

## 2025-04-09 PROCEDURE — 96116 NUBHVL XM PHYS/QHP 1ST HR: CPT | Performed by: CLINICAL NEUROPSYCHOLOGIST

## 2025-04-09 PROCEDURE — 96139 PSYCL/NRPSYC TST TECH EA: CPT | Mod: AH | Performed by: CLINICAL NEUROPSYCHOLOGIST

## 2025-04-09 PROCEDURE — 89051 BODY FLUID CELL COUNT: CPT | Performed by: NEUROLOGICAL SURGERY

## 2025-04-09 PROCEDURE — 2500000004 HC RX 250 GENERAL PHARMACY W/ HCPCS (ALT 636 FOR OP/ED): Performed by: NEUROLOGICAL SURGERY

## 2025-04-09 PROCEDURE — 96139 PSYCL/NRPSYC TST TECH EA: CPT | Performed by: CLINICAL NEUROPSYCHOLOGIST

## 2025-04-09 PROCEDURE — 87070 CULTURE OTHR SPECIMN AEROBIC: CPT | Performed by: NEUROLOGICAL SURGERY

## 2025-04-09 PROCEDURE — 82945 GLUCOSE OTHER FLUID: CPT | Performed by: NEUROLOGICAL SURGERY

## 2025-04-09 PROCEDURE — 96132 NRPSYC TST EVAL PHYS/QHP 1ST: CPT | Performed by: CLINICAL NEUROPSYCHOLOGIST

## 2025-04-09 PROCEDURE — 62328 DX LMBR SPI PNXR W/FLUOR/CT: CPT | Performed by: RADIOLOGY

## 2025-04-09 RX ORDER — LIDOCAINE HYDROCHLORIDE 10 MG/ML
2 INJECTION, SOLUTION EPIDURAL; INFILTRATION; INTRACAUDAL; PERINEURAL ONCE
Status: COMPLETED | OUTPATIENT
Start: 2025-04-09 | End: 2025-04-09

## 2025-04-09 RX ORDER — ACETAMINOPHEN 325 MG/1
650 TABLET ORAL ONCE
Status: DISCONTINUED | OUTPATIENT
Start: 2025-04-09 | End: 2025-04-09

## 2025-04-09 RX ORDER — ACETAMINOPHEN 325 MG/1
975 TABLET ORAL ONCE
Status: COMPLETED | OUTPATIENT
Start: 2025-04-09 | End: 2025-04-09

## 2025-04-09 RX ADMIN — LIDOCAINE HYDROCHLORIDE 20 MG: 10 INJECTION, SOLUTION EPIDURAL; INFILTRATION; INTRACAUDAL; PERINEURAL at 11:06

## 2025-04-09 RX ADMIN — ACETAMINOPHEN 975 MG: 325 TABLET, FILM COATED ORAL at 11:30

## 2025-04-09 ASSESSMENT — PAIN SCALES - GENERAL
PAINLEVEL_OUTOF10: 0 - NO PAIN
PAINLEVEL_OUTOF10: 5 - MODERATE PAIN

## 2025-04-09 ASSESSMENT — PAIN - FUNCTIONAL ASSESSMENT: PAIN_FUNCTIONAL_ASSESSMENT: 0-10

## 2025-04-09 ASSESSMENT — PAIN DESCRIPTION - DESCRIPTORS: DESCRIPTORS: ACHING

## 2025-04-09 NOTE — POST-PROCEDURE NOTE
Neuroradiology Post-Procedure Note    Procedure Details:  The L3-L4 vertebral space marked under fluoroscopy, with note made of L3-L5 laminectomy. Patient was prepped and draped in the usual sterile fashion. 2 ml 1% lidocaine injected for local anesthesia. Under direct fluoroscopic guidance, a lumbar puncture was performed at the L3-L4 interspace using a 20g spinal needle. A total of 40 ml of clear CSF was collected in 4 tubes. Opening pressure was 8 cm H2O. The collected CSF was then sent to the lab for appropriate lab tests as ordered by the referring physician. Hemostasis was achieved with direct pressure. The patient tolerated procedure well without any immediate or clinically apparent complications. See PACS for full procedural report.     Patient Tolerance: good  Complications: None    Indication for procedure: The encounter diagnosis was NPH (normal pressure hydrocephalus) (Multi).    Pre-Procedure Verification and Time Out:  · Procedure Location procedure area   · HUDDLE - Pre-procedure Verification completed   · TIME OUT - Final Verification completed immediately prior to procedure start   · DEBRIEF completed     General Information:  Date/Time of Procedure: 04/09/25 at 10:38 AM  Indication(s): NPH  Findings: See PACS  Procedure performed by:  Dr. Manpreet Fournier    Assistant(s): Annabelle Rousseau MD  Estimated Blood Loss (mL): none  Specimen: Yes, 40 mL CSF  Informed Consent: written consent obtained    Prep:  Ultrasound Guided Insertion: No  Hand Hygiene, Surgical Cap, Surgical Mask, Sterile Gloves, and Drape  Patient Position:  Prone/Right Lateral Decubitus  Site Prep: betadine, draped, usual sterile procedure followed    Anesthesia/Medications:  Procedural Sedation:  Medications (Filter: Administrations occurring from 1038 to 1038 on 04/09/25) As of 04/09/25 1038      None          1% Lidocaine: 2 mL    Annabelle Rousseau MD  PGY-5, Diagnostic Radiology  Contact via [employer]

## 2025-04-09 NOTE — DISCHARGE INSTRUCTIONS
Discharge information    See Lumbar Puncture patient information sheet.     Ok to remove band-aid on 4/10/25 at 1030am.  Ok to shower on 4/10/25, no baths, jacuzzis, or swimming. Do not get submerged in a body of water (leads to increased risk of infection.)  Ok to keep open until healed. Healing is when a scab is created and falls off, usually within 5-10 days.     Look for signs and symptoms of infection:  Including: redness, swelling, discharge such as pus, or odor from site., temp of 100.5*F or greater.    Look for bleeding from site:  If bleeding occurs hold pressure for 5 minutes with paper towel, check site if still bleeding hold for 5 more minutes  If site continues to bleed after 10 minutes call 911.    For questions related to your procedure:  Please call [phone] between the hours of 7:00am-5:00pm Monday through Friday.  Please call [phone] for Resident on call after 5:00pm weeknights, on weekends and holidays.     In the event of an emergency call 911 or go to your nearest emergency room.

## 2025-04-12 LAB
BACTERIA CSF CULT: NORMAL
GRAM STN SPEC: NORMAL
GRAM STN SPEC: NORMAL

## 2025-04-16 LAB
BACTERIA CSF CULT: NORMAL
GRAM STN SPEC: NORMAL
GRAM STN SPEC: NORMAL

## 2025-04-21 ENCOUNTER — PREP FOR PROCEDURE (OUTPATIENT)
Dept: NEUROSURGERY | Facility: HOSPITAL | Age: 75
End: 2025-04-21

## 2025-04-21 ENCOUNTER — APPOINTMENT (OUTPATIENT)
Dept: NEUROSURGERY | Facility: CLINIC | Age: 75
End: 2025-04-21
Payer: MEDICARE

## 2025-04-21 VITALS
RESPIRATION RATE: 16 BRPM | HEIGHT: 67 IN | TEMPERATURE: 98.4 F | DIASTOLIC BLOOD PRESSURE: 58 MMHG | SYSTOLIC BLOOD PRESSURE: 91 MMHG | HEART RATE: 84 BPM | WEIGHT: 141.98 LBS | BODY MASS INDEX: 22.28 KG/M2

## 2025-04-21 DIAGNOSIS — G91.2 NPH (NORMAL PRESSURE HYDROCEPHALUS) (MULTI): Primary | ICD-10-CM

## 2025-04-21 PROCEDURE — 1126F AMNT PAIN NOTED NONE PRSNT: CPT | Performed by: NEUROLOGICAL SURGERY

## 2025-04-21 PROCEDURE — 1036F TOBACCO NON-USER: CPT | Performed by: NEUROLOGICAL SURGERY

## 2025-04-21 PROCEDURE — 99212 OFFICE O/P EST SF 10 MIN: CPT | Performed by: NEUROLOGICAL SURGERY

## 2025-04-21 PROCEDURE — 1159F MED LIST DOCD IN RCRD: CPT | Performed by: NEUROLOGICAL SURGERY

## 2025-04-21 RX ORDER — CARVEDILOL 25 MG/1
25 TABLET ORAL
COMMUNITY
Start: 2013-11-20

## 2025-04-21 RX ORDER — FERROUS SULFATE 325(65) MG
325 TABLET, DELAYED RELEASE (ENTERIC COATED) ORAL
COMMUNITY
Start: 2023-08-31

## 2025-04-21 RX ORDER — METOPROLOL TARTRATE 25 MG/1
TABLET, FILM COATED ORAL
COMMUNITY
Start: 2025-02-26

## 2025-04-21 RX ORDER — FLUTICASONE PROPIONATE 50 MCG
SPRAY, SUSPENSION (ML) NASAL
COMMUNITY
Start: 2014-12-24

## 2025-04-21 RX ORDER — CALCIUM CARBONATE/VITAMIN D3 500-10/5ML
400 LIQUID (ML) ORAL
COMMUNITY
Start: 2024-10-02

## 2025-04-21 RX ORDER — GABAPENTIN 600 MG/1
600 TABLET ORAL
COMMUNITY

## 2025-04-21 RX ORDER — ENOXAPARIN SODIUM 60 MG/.6ML
60 INJECTION SUBCUTANEOUS
COMMUNITY
Start: 2025-04-15 | End: 2025-05-03

## 2025-04-21 RX ORDER — SPIRONOLACTONE 25 MG/1
TABLET ORAL
COMMUNITY
Start: 2023-12-06

## 2025-04-21 RX ORDER — GLIPIZIDE 2.5 MG/1
TABLET, EXTENDED RELEASE ORAL
COMMUNITY

## 2025-04-21 ASSESSMENT — PAIN SCALES - GENERAL: PAINLEVEL_OUTOF10: 0-NO PAIN

## 2025-04-21 ASSESSMENT — ENCOUNTER SYMPTOMS
OCCASIONAL FEELINGS OF UNSTEADINESS: 1
DEPRESSION: 1
LOSS OF SENSATION IN FEET: 0

## 2025-04-21 NOTE — PROGRESS NOTES
Saw Dr. Lau and LP on 4/9/2025. After the LP he  was walking faster and memory was better .     75-year-old man returns for follow-up after undergoing lumbar puncture testing for normal pressure hydrocephalus.  The patient does not feel that he was any different however his family thought that his mobility was a little bit better that day.  They also felt that his tremor had improved.  These changes only lasted a day.    On exam, the patient is alert and interactive.  He demonstrates some psychomotor slowing.  He moves all extremities to command.    On formal neuropsychological testing the patient did not show any objective improvements to his gait however had improvement in several psychomotor and cognitive domains following lumbar puncture.    Patient's clinical presentation, imaging, and response to lumbar puncture are indicative of normal pressure hydrocephalus.  To address this, I have offered the patient surgical placement of a ventriculoperitoneal shunt.  We reviewed the risks and benefits of this today.  Specifically, we discussed that is not possible to predict, to the patient's symptoms is from the hydrocephalus versus other age-related degeneration.  The patient wishes to proceed with surgery.

## 2025-04-21 NOTE — PROGRESS NOTES
Neuropsychological Consultation    Name (, MRN): Nahid RAYMOND (1950, [id number])  Exam Date:  2025  Referring:  CEDRICK Melton MD, Neurological Surgery    REASON FOR EVALUATION:    NPH Presurgical Evaluation to Rule out Memory Loss    CURRENT COMPLAINTS AND HISTORY:      Mr. Raymond is a 74-year-old right-handed male who was referred with progressive gait disturbance beginning a couple years ago, memory decline beginning 1 year ago, and urinary changes beginning a few months ago (increased frequency, urgency, and urge incontinence), and together with ventriculomegaly on neuroimaging.  Neuropsychological evaluation was requested before and after high-volume lumbar puncture to assist in clarifying the diagnosis and to provide additional information regarding prognosis following possible neurosurgical management.         Other medical/surgical, and psychiatric history was reviewed and was deemed to be noncontributory to the presenting complaint.  Mr. Raymond denied clinically significant use of alcohol (sober x 50 years), tobacco and recreational drugs.      With regard to educational and vocational history, Mr. Raymond completed 10th Grade earning mostly Cs & Bs    Family history is noteworthy for cerebrovascular disease (sister, stroke) and mental health conditions (brother, depression).  There is no known family history of Parkinson's disease/essential tremor/movement disorder, memory loss/dementia/Alzheimer's, epilepsy/seizures, brain tumor, multiple sclerosis, or other neurological disorder.      He worked in [employer] for 30 years before retiring from that role; he then worked 10 years as an  for [employer] [place].    RELEVANT LABS/EXAMS:      CT of the brain without contrast on 3/19/25 was interpreted by the neuroradiologist as follows:    IMPRESSION:  There is again evidence of moderate brain parenchymal volume loss  similar when compared with the prior study dated  02/24/2025.      The lateral ventricles demonstrate mild disproportionate prominence  when compared with the sulci within cerebral convexities. No  obstructing mass lesion is noted on this noncontrast CT study. This  mild disproportionate ventriculomegaly may be related to more  pronounced central volume loss with the possibility of a component of  stable communicating hydrocephalus not entirely excluded.      Mild nonspecific white matter changes are again noted within cerebral  hemispheres bilaterally which while nonspecific, given the patient's  age, likely represent sequelae of small-vessel ischemic change.    I reviewed the MRI personally and by my computation the Otero ratio was 0.40 , exceeding the radiographic cutoff associated with normal pressure hydrocephalus.      Fluoroscopic guided lumbar puncture (4/9/25, between pre- and post-LP neuropsychological testing) diverted 40 ml of clear CSF and recorded an opening pressure of 8 cm H2O.    MEDICATIONS:      As of 4/9/2025 8:02 AM  donepezil HCl 10 mg 10 mg.  minocycline HCl 100 mg 100 mg 1 caps, ORAL, QPM, 90 caps  omeprazole magnesium 20 mg 1 tablet Daily  tamsulosin HCl 0.4 mg  torsemide 10 mg 1 tablet Daily  warfarin sodium 4 mg    MENTAL STATUS, BEHAVIOR OBSERVATIONS, AND VALIDITY:      During interview, Mr. Gastelum presented in no acute distress and was well groomed.  He was alert and oriented to person, place and time.  He ambulated in clinic slowly and unsteadily with a cane; there was no apparent unintentional head or limb movement.  He appeared to comprehend spoken language without difficulty.  Conversational speech was characterized by long response latencies, slow rate, and hypophonia but was otherwise articulate and fluent with normal volume, rhythm, rate, and intonation and with no discernible dysnomia.  Expressed thoughts were logically organized, reality-based, and appropriate to context.  Within the interview, Mr. Gastelum seemed to recall recent  information (short-term memory) and remote personal history (long-term memory) slowly but accurately; he exhibited good insight into the current condition and the reason for the evaluation, and he evidenced no apparent lapses in judgment.  During interview, he appeared dysphoric, and affect was restricted.  Mood was described as “depressed.”  Mr. Gastelum endorsed denied anhedonia, altered appetite, sleep disturbance (except due to frequent nocturnal urination), hallucinations, and suicidal/homicidal ideation; there were no apparent delusions.      During testing, rapport was quickly established, and Mr. Gastelum appeared to give good effort on all tasks.  These results as interpreted are considered reliable and valid.    ASSESSMENT PROCEDURES:      Review of Records; Neurobehavioral Interview with Mr. aGstelum, his wife Alicja, and daughter Eugenia; Wechsler Adult Intelligence Scale-3rd Ed. (WAIS-III) Digit Span and Digit Symbol; Trail Making Test (TMT); Finger Tapping; Controlled Oral Word Association Test (COWA) Phonemic and Semantic Fluency; Winkler Verbal Learning Test, Revised (HVLT-R); Timed Gait Test (90' with 180o turn at midpoint; average of 2 trials pre- and post-LP); North American Adult Reading Test (NAART); [place] Naming Test (BNT); Clock Drawing Test; and Geriatric Depression Scale (GDS); Interactive Face-to-Face Feedback (Impressions, Recommendations, Patient Education); Coordination of care with other health care providers involved in the care plan; Integration, interpretation, and preparation of final report.    Most tests were administered prior to high-volume LP and again following recovery using alternate forms (Form 2 pre-LP; Form 3 post-LP); NAART, BNT, Clock Drawing, and GDS were administered only once during the exam.    CONCLUSIONS AND DIAGNOSTIC IMPRESSIONS:    Mr. Gastelum presented with a history of progressive gait disturbance, urinary frequency/urgency/incontinence, and cognitive decline, together with  disproportionate ventricular dilatation on neuroimaging.  The neuropsychological profile contained features characteristic of subcortical/frontal conditions like NPH (slow processing speed, disproportionate improvement in memory retrieval with cues/recognition, and sparing of language skills).      As a reference for interpreting changes following high-volume lumbar puncture on the objective, standardized tests reported below, multiple studies have shown that improvement by >10% in either walking or cognitive performances following a high-volume lumbar puncture is supportive of a diagnosis of NPH and predicts favorable response to shunt.      For Mr. Gastelum, following high-volume lumbar puncture there no discernible improvement in gait.  However, objective neuropsychological testing demonstrated robust improvements on 6 of 7 processing speed measures (ranging from +18% to +80%, Mn=27.0%). Taken together, the constellation and progression of symptoms, imaging, and neuropsychological results are consistent with NPH; therefore, permanent CSF diversion (e.g., shunt) might prevent further deterioration, and improvement observed following LP suggests that at least some of the gait and cognitive changes could be improved.      At present, language (confrontation naming and semantic fluency) skills appear to be preserved, and the memory profile is frontal/subcortical in nature; this makes it unlikely that Alzheimer's disease is contributing to the current memory changes.    These findings are consistent with diagnoses of memory difficulty (ICD R41.3) and psychomotor slowing/deficits (ICD R41.843), together with gait disturbance (ICD R26.9), urinary frequency (ICD R35.0), urinary urgency (ICD R39.15), urinary incontinence (ICD R32), and cerebral ventriculomegaly (ICD G93.89) attributed to normal pressure hydrocephalus (ICD G91.2).    RECOMMENDATIONS:    Mr. Gastelum was advised to follow up with Dr. Melton in Neurosurgery to  discuss these results in the context of the rest of the neurosurgical work-up to determine whether a shunt would be appropriate and to review potential risks and benefits associated with that procedure.      If Mr. Gastelum proceeds to surgery, physical therapy after surgery would be advisable to help build strength and balance after the contribution of NPH is better managed.      If Mr. Gastelum proceeds to surgery, neuropsychological evaluation is recommended 3-4 months afterward to assess responsiveness of symptoms to shunt and to characterize any needs at that time.  Please schedule this with the Neuropsychology  [phone]) when the surgery date is set.    Mr. Gastelum reported a longstanding history of depression, but he is not on medication currently.  His depression symptom ratings were moderately elevated on this exam.  Consultation with his primary care provider or a psychiatrist is strongly recommended to guide future treatment.    Activities and Lifestyle Changes to Maintain Brain Health and Cognitive Functioning:    Physical exercise carries many benefits.  It reduces stress and anxiety, elevates mood, improves quality of sleep, and improves cardiac/vascular health, brain health, and overall physical well-being.  Incorporating exercise into our daily routine is ideal (even just walking for 30 minutes at lunch or after work); however, any exercise is better than no exercise, and more exercise is better than less exercise.  Current AHA and CDC guidelines recommend moderate intensity exercise such as walking for 150 minutes per week (30-40 minutes/day, 4-5 days/week) or vigorous intensity exercise such as jogging/running for 75 minutes per week (25-40 minutes/day, 2-3 days/week).    The following activities and interventions are recommended for optimizing brain health and preserving cognitive function:  the “MIND diet” for heart and brain health    ([email or web address]-[email or web address]);  good stress management (e.g., relaxation techniques);  management of any vascular risks (e.g., diabetes, hypertension, cholesterol);  30-60 minutes of daily aerobic exercise (e.g., walking, swimming);  social engagement (e.g., getting together with other people regularly); and   cognitively stimulating activities (e.g., playing cards, board games, computer games; taking classes, joining study/discussion groups, pursuing a new hobby; completing crossword, [employer]u, word-search and other puzzles; reading books about new topics, cultures, or geographical areas).      Restorative sleep (7-9 hours for adults) contributes to good physical and brain health and is critical to daytime alertness and safety, cognitive functioning, school/work performance, mood, and interpersonal relationships.  The following behavioral strategies and environmental modifications can improve onset, duration and quality of sleep:  Set a fixed bedtime and waking time every day.  Avoid napping during the day.   Avoid alcohol, caffeine and heavy, spicy, or sugary foods 4-6 hours before bedtime.   Exercise regularly, but not right before going to bed.  Block out all distractions by turning off - or even removing from the room - electronic devices such as TV, computer, phone, video player, audio player, enio device, etc.  Reading for pleasure is an excellent substitute for activities on electronic devices for the purpose of improving sleep onset.  Use comfortable bedding, set a comfortable temperature, and keep the room well ventilated.  Do not use the bed as an office, workroom, or recreation room.   Establish a pre-sleep ritual, such as a warm bath or a few minutes of reading.  If prone to anxiety, relaxation techniques such as yoga and deep breathing can be helpful.    Good hydration is important to optimal cognitive functioning and has many other health  benefits as well (e.g., increased energy, better mood, and prevention/reduction of headache, dizziness, and other health symptoms/problems).  Guidelines vary depending on multiple factors, but several general principles are consistently endorsed:  At a minimum, drink fluids whenever you feel thirsty; plain water is the best source of fluid intake; and limit caffeine and alcohol, which reduce fluid in the body.  Your primary care provider can provide specific guidelines for you personally.    I reviewed and discussed results, impressions, and recommendations with Mr. Gastelum, his wife Alicja, and daughter Eugenia immediately following the exam.  I provided an overview of normal pressure hydrocephalus and surgical intervention, explained the role of neuropsychological evaluation in presurgical evaluation and postsurgical management, and described anticipated outcomes following surgery versus without treatment.  I answered all questions to their satisfaction at that time.  Although we did not have time in clinic to discuss a brain-healthy lifestyle, the recommendations above include evidence-based activities and health behaviors to promote brain health and to preserve cognitive functioning as we get older.    Thank you for the opportunity to participate in Mr. Gastelum's evaluation and care.  If I can provide any additional assistance, please do not hesitate to contact me at [phone].    Sincerely,        Willie Lau, PhD  Senior Attending Neuropsychologist, [facility]  Former Director of Clinical Neuropsychology, [facility] [place]  Professor, Dept. of Neurology, [employer]  Fellow of the National Academy of Neuropsychology  Fellow of the American Psychological Association  Fellow of the Sports Neuropsychology Society  Fellow of the American Epilepsy Society    ICD R41.3, R41.843, R26.9, R35.0, R39.15, R32, G93.89,  G91.2  85153=2; 03208=9; 60240=6; 34790=2; 48287=4    Orig: CEDRICK Melton MD, Neurological Surgery, [facility]  cc: Yoav Goldsmith RN, Neurological Surgery, [facility]  cc: Manpreet Perez MD, Family Medicine, /[facility] (Fax: [phone])  cc: Mr. Gastelum (electronically via  [employer])  cc: [facility] Electronic Medical Record

## 2025-04-22 PROBLEM — G91.2 NPH (NORMAL PRESSURE HYDROCEPHALUS) (MULTI): Status: ACTIVE | Noted: 2025-04-21

## 2025-04-24 ENCOUNTER — APPOINTMENT (OUTPATIENT)
Dept: PREADMISSION TESTING | Facility: HOSPITAL | Age: 75
End: 2025-04-24
Payer: MEDICARE

## 2025-04-25 PROBLEM — R41.843 PSYCHOMOTOR DEFICIT: Status: ACTIVE | Noted: 2025-04-25

## 2025-04-25 PROBLEM — R39.15 URINARY URGENCY: Status: ACTIVE | Noted: 2025-04-25

## 2025-04-25 PROBLEM — R41.3 MEMORY DEFICIT: Status: ACTIVE | Noted: 2025-04-25

## 2025-04-25 PROBLEM — R26.9 GAIT DISTURBANCE: Status: ACTIVE | Noted: 2025-04-25

## 2025-04-25 PROBLEM — G93.89 CEREBRAL VENTRICULOMEGALY: Status: ACTIVE | Noted: 2025-04-25

## 2025-04-25 PROBLEM — N39.41 URGE INCONTINENCE OF URINE: Status: ACTIVE | Noted: 2025-04-25

## 2025-04-25 PROBLEM — R35.0 URINARY FREQUENCY: Status: ACTIVE | Noted: 2025-04-25

## 2025-05-05 ENCOUNTER — HOSPITAL ENCOUNTER (OUTPATIENT)
Dept: RADIOLOGY | Facility: CLINIC | Age: 75
Discharge: HOME | End: 2025-05-05
Payer: MEDICARE

## 2025-05-05 DIAGNOSIS — G91.2 NPH (NORMAL PRESSURE HYDROCEPHALUS) (MULTI): ICD-10-CM

## 2025-05-05 PROCEDURE — 70450 CT HEAD/BRAIN W/O DYE: CPT

## 2025-05-05 PROCEDURE — 70450 CT HEAD/BRAIN W/O DYE: CPT | Performed by: RADIOLOGY

## 2025-05-09 ENCOUNTER — PRE-ADMISSION TESTING (OUTPATIENT)
Dept: PREADMISSION TESTING | Facility: HOSPITAL | Age: 75
End: 2025-05-09
Payer: MEDICARE

## 2025-05-09 VITALS
DIASTOLIC BLOOD PRESSURE: 72 MMHG | SYSTOLIC BLOOD PRESSURE: 112 MMHG | WEIGHT: 145.6 LBS | HEART RATE: 69 BPM | BODY MASS INDEX: 22.85 KG/M2 | HEIGHT: 67 IN | TEMPERATURE: 97.4 F

## 2025-05-09 DIAGNOSIS — I34.0 MITRAL VALVE INSUFFICIENCY, UNSPECIFIED ETIOLOGY: ICD-10-CM

## 2025-05-09 DIAGNOSIS — Z01.818 PREOPERATIVE TESTING: Primary | ICD-10-CM

## 2025-05-09 DIAGNOSIS — G91.2 NPH (NORMAL PRESSURE HYDROCEPHALUS) (MULTI): ICD-10-CM

## 2025-05-09 DIAGNOSIS — E13.69 OTHER SPECIFIED DIABETES MELLITUS WITH OTHER SPECIFIED COMPLICATION, UNSPECIFIED WHETHER LONG TERM INSULIN USE (MULTI): ICD-10-CM

## 2025-05-09 LAB
ANION GAP SERPL CALC-SCNC: 14 MMOL/L (ref 10–20)
APTT PPP: 27 SECONDS (ref 26–36)
BUN SERPL-MCNC: 17 MG/DL (ref 6–23)
CALCIUM SERPL-MCNC: 9.5 MG/DL (ref 8.6–10.6)
CHLORIDE SERPL-SCNC: 102 MMOL/L (ref 98–107)
CO2 SERPL-SCNC: 28 MMOL/L (ref 21–32)
CREAT SERPL-MCNC: 0.82 MG/DL (ref 0.5–1.3)
EGFRCR SERPLBLD CKD-EPI 2021: >90 ML/MIN/1.73M*2
ERYTHROCYTE [DISTWIDTH] IN BLOOD BY AUTOMATED COUNT: 13.5 % (ref 11.5–14.5)
EST. AVERAGE GLUCOSE BLD GHB EST-MCNC: 134 MG/DL
GLUCOSE SERPL-MCNC: 96 MG/DL (ref 74–99)
HBA1C MFR BLD: 6.3 % (ref ?–5.7)
HCT VFR BLD AUTO: 41.9 % (ref 41–52)
HGB BLD-MCNC: 12.9 G/DL (ref 13.5–17.5)
INR PPP: 1 (ref 0.9–1.1)
MCH RBC QN AUTO: 30.8 PG (ref 26–34)
MCHC RBC AUTO-ENTMCNC: 30.8 G/DL (ref 32–36)
MCV RBC AUTO: 100 FL (ref 80–100)
NRBC BLD-RTO: 0 /100 WBCS (ref 0–0)
PLATELET # BLD AUTO: 203 X10*3/UL (ref 150–450)
POTASSIUM SERPL-SCNC: 4.2 MMOL/L (ref 3.5–5.3)
PROTHROMBIN TIME: 10.8 SECONDS (ref 9.8–12.4)
RBC # BLD AUTO: 4.19 X10*6/UL (ref 4.5–5.9)
SODIUM SERPL-SCNC: 140 MMOL/L (ref 136–145)
WBC # BLD AUTO: 8 X10*3/UL (ref 4.4–11.3)

## 2025-05-09 PROCEDURE — 99205 OFFICE O/P NEW HI 60 MIN: CPT

## 2025-05-09 PROCEDURE — 36415 COLL VENOUS BLD VENIPUNCTURE: CPT

## 2025-05-09 PROCEDURE — 85610 PROTHROMBIN TIME: CPT | Performed by: NEUROLOGICAL SURGERY

## 2025-05-09 PROCEDURE — 85027 COMPLETE CBC AUTOMATED: CPT | Performed by: NEUROLOGICAL SURGERY

## 2025-05-09 PROCEDURE — 83036 HEMOGLOBIN GLYCOSYLATED A1C: CPT

## 2025-05-09 PROCEDURE — 80048 BASIC METABOLIC PNL TOTAL CA: CPT

## 2025-05-09 PROCEDURE — 87081 CULTURE SCREEN ONLY: CPT

## 2025-05-09 RX ORDER — CHLORHEXIDINE GLUCONATE ORAL RINSE 1.2 MG/ML
SOLUTION DENTAL
Qty: 120 ML | Refills: 0 | Status: ON HOLD | OUTPATIENT
Start: 2025-05-09

## 2025-05-09 RX ORDER — FAMOTIDINE 40 MG/1
40 TABLET, FILM COATED ORAL NIGHTLY PRN
Status: ON HOLD | COMMUNITY
Start: 2025-04-01

## 2025-05-09 RX ORDER — KETOCONAZOLE 20 MG/ML
1 SHAMPOO, SUSPENSION TOPICAL WEEKLY
COMMUNITY
Start: 2015-03-06 | End: 2025-05-14 | Stop reason: ENTERED-IN-ERROR

## 2025-05-09 RX ORDER — TRAMADOL HYDROCHLORIDE 50 MG/1
50 TABLET ORAL 2 TIMES DAILY PRN
Status: ON HOLD | COMMUNITY
Start: 2025-01-03

## 2025-05-09 RX ORDER — MULTIVITAMIN/IRON/FOLIC ACID 18MG-0.4MG
1 TABLET ORAL DAILY
Status: ON HOLD | COMMUNITY

## 2025-05-09 RX ORDER — IBUPROFEN 200 MG
1 CAPSULE ORAL DAILY
Status: ON HOLD | COMMUNITY

## 2025-05-09 RX ORDER — CHLORHEXIDINE GLUCONATE 40 MG/ML
SOLUTION TOPICAL
Qty: 473 ML | Refills: 0 | Status: ON HOLD | OUTPATIENT
Start: 2025-05-09

## 2025-05-09 RX ORDER — ATORVASTATIN CALCIUM 80 MG/1
80 TABLET, FILM COATED ORAL DAILY
Status: ON HOLD | COMMUNITY
Start: 2023-10-27

## 2025-05-09 RX ORDER — EZETIMIBE 10 MG/1
10 TABLET ORAL DAILY
Status: ON HOLD | COMMUNITY
Start: 2023-10-27

## 2025-05-09 RX ORDER — ASCORBIC ACID 250 MG
250 TABLET ORAL DAILY
Status: ON HOLD | COMMUNITY

## 2025-05-09 ASSESSMENT — ENCOUNTER SYMPTOMS
ENDOCRINE NEGATIVE: 1
LIGHT-HEADEDNESS: 1
ARTHRALGIAS: 1
DYSPNEA WITH EXERTION: 1
BRUISES/BLEEDS EASILY: 1
DIARRHEA: 1
SINUS CONGESTION: 1
CONSTITUTIONAL NEGATIVE: 1
COUGH: 1
NECK PAIN: 1

## 2025-05-09 ASSESSMENT — DUKE ACTIVITY SCORE INDEX (DASI)
CAN YOU PARTICIPATE IN MODERATE RECREATIONAL ACTIVITIES LIKE GOLF, BOWLING, DANCING, DOUBLES TENNIS OR THROWING A BASEBALL OR FOOTBALL: NO
CAN YOU DO YARD WORK LIKE RAKING LEAVES, WEEDING OR PUSHING A MOWER: NO
CAN YOU HAVE SEXUAL RELATIONS: NO
DASI METS SCORE: 3.6
CAN YOU WALK A BLOCK OR TWO ON LEVEL GROUND: YES
CAN YOU CLIMB A FLIGHT OF STAIRS OR WALK UP A HILL: NO
CAN YOU WALK INDOORS, SUCH AS AROUND YOUR HOUSE: YES
CAN YOU RUN A SHORT DISTANCE: NO
CAN YOU DO HEAVY WORK AROUND THE HOUSE LIKE SCRUBBING FLOORS OR LIFTING AND MOVING HEAVY FURNITURE: NO
TOTAL_SCORE: 7.2
CAN YOU DO MODERATE WORK AROUND THE HOUSE LIKE VACUUMING, SWEEPING FLOORS OR CARRYING GROCERIES: NO
CAN YOU TAKE CARE OF YOURSELF (EAT, DRESS, BATHE, OR USE TOILET): NO
CAN YOU DO LIGHT WORK AROUND THE HOUSE LIKE DUSTING OR WASHING DISHES: YES
CAN YOU PARTICIPATE IN STRENOUS SPORTS LIKE SWIMMING, SINGLES TENNIS, FOOTBALL, BASKETBALL, OR SKIING: NO

## 2025-05-09 ASSESSMENT — LIFESTYLE VARIABLES: SMOKING_STATUS: NONSMOKER

## 2025-05-09 NOTE — NURSING NOTE
Patient seen in PAT. Orders reviewed with PAT Provider.     Following labs obtained by documenting RN: CBC, BMP, A1C, Coags, MRSA    EKG: NA    Patient discharged with: Pre-procedure instructions/AVS, no further questions, Echo and cardiac appointment scheduled for patient while here, pt and wife aware         Mini VIVARN, RN  [facility]   [place]

## 2025-05-09 NOTE — H&P (VIEW-ONLY)
[facility] Evaluation       Name: Nahid Gastelum (Nahid Gastelum)  /Age: 1950/75 y.o.     Visit Type:   In-Person       Chief Complaint: Perioperative visit    HPI 76 y/o male scheduled for insertion of ventriculoperitoneal shunt on 5/15/2025 secondary to normal pressure hydrocephalus with Dr. Junito Melton who referred to [facility].  Presents to [facility] today for perioperative risk stratification and optimization. PMHX includes CHF, Atrial fibrillation, anxiety, DM and BPH.        Medical History[1]    Surgical History[2]    Patient Sexual activity questions deferred to the physician.    Family History[3]    Allergies[4]    Prior to Admission medications    Medication Sig Start Date End Date Taking? Authorizing Provider   carvedilol (Coreg) 25 mg tablet 1 tablet (25 mg). 13   Historical Provider, MD   donepezil (Aricept) 10 mg tablet 10 mg. 24   Historical Provider, MD   ferrous sulfate 325 (65 Fe) mg EC tablet 1 tablet. 23   Historical Provider, MD   fluticasone (Flonase Allergy Relief) 50 mcg/actuation nasal spray Administer into affected nostril(s) once daily. 14   Historical Provider, MD   gabapentin (Neurontin) 600 mg tablet Take 1 tablet (600 mg) by mouth.    Historical Provider, MD   glipiZIDE XL (Glucotrol XL) 2.5 mg 24 hr tablet Take by mouth.    Historical Provider, MD   Lovenox 60 mg/0.6 mL syringe 0.6 mL (60 mg). 4/15/25 5/3/25  Historical Provider, MD   magnesium oxide 400 mg magnesium capsule 1 capsule (400 mg). 10/2/24   Historical Provider, MD   metoprolol tartrate (Lopressor) 25 mg tablet  25   Historical Provider, MD   minocycline 100 mg capsule 100 mg 1 caps, ORAL, QPM, 90 caps, Date: 1/3/25 2:42:00 PM EST, [facility], Capsule, 1 caps ORAL QPM, 170.1, 2024 10:08:00 EDT, Height/Length Dosing, cm, 73.8, 2024 10:07:00 EDT, Weight Dosing, kg  Patient not taking: Reported on 2025 8/10/16   Historical Provider, MD   omeprazole OTC (PriLOSEC OTC)  20 mg EC tablet Take 1 tablet (20 mg) by mouth once daily. 11/20/13   Historical Provider, MD   spironolactone (Aldactone) 25 mg tablet  12/6/23   Historical Provider, MD   tamsulosin (Flomax) 0.4 mg 24 hr capsule Take 1 capsule (0.4 mg) by mouth. 11/21/23   Historical Provider, MD   torsemide (Demadex) 10 mg tablet Take 1 tablet (10 mg) by mouth once daily. 12/5/23   Historical Provider, MD   warfarin (Coumadin) 4 mg tablet 1 tablet (4 mg). 11/20/13   Historical Provider, MD THAYER ROS:   Constitutional:   neg    Neuro/Psych:    light-headedness (when he stands up too fast)  Eyes:    visual disturbance   use of corrective lenses  Ears:   neg    Nose:    sinus congestion  Mouth:   neg    Throat:   neg    Neck:    Limited neck ROM    neck pain (5/10 neck pain)  Cardio:    LYLE  Respiratory:    cough  Endocrine:   neg    GI:    diarrhea  :   neg    Musculoskeletal:    arthralgias  Hematologic:    bruises/bleeds easily  Skin:  neg        Physical Exam  Vitals reviewed.   Constitutional:       Appearance: Normal appearance.   HENT:      Head: Normocephalic.      Nose: Nose normal.      Mouth/Throat:      Pharynx: Oropharynx is clear.   Eyes:      Pupils: Pupils are equal, round, and reactive to light.      Comments: Corrective lenses on    Neck:      Comments: Limited neck ROM   Cardiovascular:      Rate and Rhythm: Normal rate.      Pulses: Normal pulses.      Heart sounds: Normal heart sounds.   Pulmonary:      Effort: Pulmonary effort is normal.      Breath sounds: Normal breath sounds.   Genitourinary:     Comments: Not examined   Musculoskeletal:         General: Normal range of motion.      Left lower leg: Edema present.   Skin:     General: Skin is warm and dry.   Neurological:      Mental Status: He is alert and oriented to person, place, and time.      Gait: Gait abnormal (uses walker).   Psychiatric:         Mood and Affect: Mood normal.         Behavior: Behavior normal.         Thought Content: Thought  "content normal.         Judgment: Judgment normal.          PAT AIRWAY:   Airway:     Mallampati::  IV    TM distance::  >3 FB    Neck ROM::  Limited   Missing some teeth   normal        Visit Vitals  /72   Pulse 69   Temp 36.3 °C (97.4 °F)   Ht 1.702 m (5' 7\")   Wt 66 kg (145 lb 9.6 oz)   BMI 22.80 kg/m²   Smoking Status Never   BSA 1.77 m²       DASI Risk Score      Flowsheet Row Pre-Admission Testing from 5/9/2025 in [facility] Questionnaire Series Submission from 4/22/2025 in [facility] [place] with Generic Provider Martha   Can you take care of yourself (eat, dress, bathe, or use toilet)?  0 filed at 05/09/2025 1320 0  filed at 04/22/2025 1506   Can you walk indoors, such as around your house? 1.75 filed at 05/09/2025 1320 1.75  filed at 04/22/2025 1506   Can you walk a block or two on level ground?  2.75 filed at 05/09/2025 1320 2.75  filed at 04/22/2025 1506   Can you climb a flight of stairs or walk up a hill? 0 filed at 05/09/2025 1320 5.5  filed at 04/22/2025 1506   Can you run a short distance? 0 filed at 05/09/2025 1320 0  filed at 04/22/2025 1506   Can you do light work around the house like dusting or washing dishes? 2.7 filed at 05/09/2025 1320 2.7  filed at 04/22/2025 1506   Can you do moderate work around the house like vacuuming, sweeping floors or carrying groceries? 0 filed at 05/09/2025 1320 3.5  filed at 04/22/2025 1506   Can you do heavy work around the house like scrubbing floors or lifting and moving heavy furniture?  0 filed at 05/09/2025 1320 0  filed at 04/22/2025 1506   Can you do yard work like raking leaves, weeding or pushing a mower? 0 filed at 05/09/2025 1320 0  filed at 04/22/2025 1506   Can you have sexual relations? 0 filed at 05/09/2025 1320 0  filed at 04/22/2025 1506   Can you participate in moderate recreational activities like golf, bowling, dancing, doubles tennis or throwing a baseball or football? 0 filed at 05/09/2025 1320 0  " filed at 04/22/2025 1506   Can you participate in strenous sports like swimming, singles tennis, football, basketball, or skiing? 0 filed at 05/09/2025 1320 0  filed at 04/22/2025 1506   DASI SCORE 7.2 filed at 05/09/2025 1320 16.2  filed at 04/22/2025 1506   METS Score (Will be calculated only when all the questions are answered) 3.6 filed at 05/09/2025 1320 4.7  filed at 04/22/2025 1506          Caprini DVT Assessment      Flowsheet Row Pre-Admission Testing from 5/9/2025 in [facility]   DVT Score (IF A SCORE IS NOT CALCULATING, MUST SELECT A BMI TO COMPLETE) 7 filed at 05/09/2025 1413   Medical Factors Swollen legs filed at 05/09/2025 1413   Surgical Factors Major surgery planned, including arthroscopic and laproscopic (1-2 hours) filed at 05/09/2025 1413   BMI (BMI MUST BE CHOSEN) 30 or less filed at 05/09/2025 1413          Modified Frailty Index      Flowsheet Row Pre-Admission Testing from 5/9/2025 in [facility]   Non-independent functional status (problems with dressing, bathing, personal grooming, or cooking) 0.0909 filed at 05/09/2025 1432   History of diabetes mellitus  0.0909 filed at 05/09/2025 1432   History of COPD 0 filed at 05/09/2025 1432   History of CHF 0.0909 filed at 05/09/2025 1432   History of MI 0 filed at 05/09/2025 1432   History of Percutaneous Coronary Intervention, Cardiac Surgery, or Angina No filed at 05/09/2025 1432   Hypertension requiring the use of medication  0.0909 filed at 05/09/2025 1432   Peripheral vascular disease 0 filed at 05/09/2025 1432   Impaired sensorium (cognitive impairement or loss, clouding, or delirium) 0.0909 filed at 05/09/2025 1432   TIA or CVA withouy residual deficit 0 filed at 05/09/2025 1432   Cerebrovascular accident with deficit 0 filed at 05/09/2025 1432   Modified Frailty Index Calculator .4545 filed at 05/09/2025 1432          [id number] Stroke Risk Points  Current as of today        5 0 to 9 Points:      Last  Change:           The [id number] risk score (Lip GH, et al. 2009. © 2010 [employer]) quantifies the risk of stroke for a patient with atrial fibrillation. For patients without atrial fibrillation or under the age of 18 this score appears as N/A. Higher score values generally indicate higher risk of stroke.          Points Metrics   1 Has Congestive Heart Failure:  Yes      Patients with congestive heart failure get 1 point.    Current as of today   1 Has Hypertension:  Yes     Patients with hypertension get 1 point.    Current as of today   2 Age:  75     Patients 65 to 74 years old get 1 point, or patients 75 years and older get 2 points.    Current as of today   1 Has Diabetes:  Yes     Patients with diabetes get 1 point.    Current as of today   0 Had Stroke:  No  Had TIA:  No  Had Thromboembolism:  No     Patients who have had a stroke, TIA, or thromboembolism get 2 points.    Current as of today   0 Has Vascular Disease:  No     Patients with vascular disease get 1 point.    Current as of today   0 Clinically Relevant Sex:  Male     Patients with a clinically relevant sex of Female get 1 point.    Current as of today             Revised Cardiac Risk Index      Flowsheet Row Pre-Admission Testing from 5/9/2025 in [facility]   High-Risk Surgery (Intraperitoneal, Intrathoracic,Suprainguinal vascular) 0 filed at 05/09/2025 1307   History of ischemic heart disease (History of MI, History of positive exercuse test, Current chest paint considered due to myocardial ischemia, Use of nitrate therapy, ECG with pathological Q Waves) 0 filed at 05/09/2025 1307   History of congestive heart failure (pulmonary edemia, bilateral rales or S3 gallop, Paroxysmal nocturnal dyspnea, CXR showing pulmonary vascular redistribution) 1 filed at 05/09/2025 1307   History of cerebrovascular disease (Prior TIA or stroke) 0 filed at 05/09/2025 1307   Pre-operative insulin treatment 0 filed at  05/09/2025 1307   Pre-operative creatinine>2 mg/dl 0 filed at 05/09/2025 1307   Revised Cardiac Risk Calculator 1 filed at 05/09/2025 1307          Apfel Simplified Score      Flowsheet Row Pre-Admission Testing from 5/9/2025 in [facility]   Smoking status 1 filed at 05/09/2025 1432   History of motion sickness or PONV  0 filed at 05/09/2025 1432   Use of postoperative opioids 1 filed at 05/09/2025 1432   Gender - Female 0=No filed at 05/09/2025 1432   Apfel Simplified Score Calculator 2 filed at 05/09/2025 1432          Risk Analysis Index Results This Encounter    No data found in the last 10 encounters.       Stop Bang Score      Flowsheet Row Pre-Admission Testing from 5/9/2025 in [facility] Questionnaire Series Submission from 4/22/2025 in [facility] [place] with Generic Provider Mychart   Do you snore loudly? 1 filed at 05/09/2025 1320 1  filed at 04/22/2025 1506   Do you often feel tired or fatigued after your sleep? 1 filed at 05/09/2025 1320 1  filed at 04/22/2025 1506   Has anyone ever observed you stop breathing in your sleep? 0 filed at 05/09/2025 1320 0  filed at 04/22/2025 1506   Do you have or are you being treated for high blood pressure? 1 filed at 05/09/2025 1320 1  filed at 04/22/2025 1506   Recent BMI (Calculated) 22.2 filed at 05/09/2025 1320 22.2  filed at 04/22/2025 1506   Is BMI greater than 35 kg/m2? 0=No filed at 05/09/2025 1320 0=No  filed at 04/22/2025 1506   Age older than 50 years old? 1=Yes filed at 05/09/2025 1320 1=Yes  filed at 04/22/2025 1506   Is your neck circumference greater than 17 inches (Male) or 16 inches (Female)? 0 filed at 05/09/2025 1320 --   Gender - Male 1=Yes filed at 05/09/2025 1320 1=Yes  filed at 04/22/2025 1506   STOP-BANG Total Score 5 filed at 05/09/2025 1320 --          Prodigy: High Risk  Total Score: 30              Prodigy Age Score      Prodigy Gender Score     Prodigy Previous Opioid Use Score       Prodigy CHF score          ARISCAT Score for Postoperative Pulmonary Complications      Flowsheet Row Pre-Admission Testing from 5/9/2025 in [facility]   Age Calculated Score 3 filed at 05/09/2025 1416   Preoperative SpO2 8 filed at 05/09/2025 1416   Respiratory infection in the last month Either upper or lower (i.e., URI, bronchitis, pneumonia), with fever and antibiotic treatment 0 filed at 05/09/2025 1416   Preoperative anemia (Hgb less than 10 g/dl) 0 filed at 05/09/2025 1416   Surgical incision  0 filed at 05/09/2025 1416   Duration of surgery  0 filed at 05/09/2025 1416   Emergency Procedure  0 filed at 05/09/2025 1416   ARISCAT Total Score  11 filed at 05/09/2025 1416          [place] Perioperative Risk for Myocardial Infarction or Cardiac Arrest (TOO)    No data to display       PCP Dr. Manpreet Perez with [facility]    Assessment and Plan:     Anesthesia  The patient denies problems with anesthesia in the past such as PONV, prolonged sedation, awareness, dental damage, aspiration, cardiac arrest, difficult intubation, or unexpected hospital admissions.     Airway  No documented or reported history of airway difficulty.     Neurology  #Normal pressure hydrocephalus c/o psychomotor slowing and tremor  -->Patient has seen Dr. Melton who is planning placement of a ventriculoperitoneal shunt     #Dementia managed on donepezil (continue)   -->Has seen Dr. Lau for a neuropsychological consultation on 4/9/25    The patient is at increased risk for postoperative delirium secondary to age 65 or older, decreased functional status. The patient is at increased risk for perioperative stroke secondary to prior CVA/TIA, a-fib, perioperative interruption of antithrombotic, increased age, diabetes mellitus, general anesthesia. Handouts for preoperative brain exercises given to patient.    HEENT  No diagnoses or significant findings on chart review or clinical presentation and  evaluation.    Cardiovascular  #Atrial fibrillation on coumadin (hold 5 days before surgery, on 5/10), metoprolol (continue) and coreg (continue)  --> Per patient and his wife, his PCP Dr. Perez started Lovenox bridge prior to surgery  Lovenox [id number], Use to bridge around upcoming procedures as directed:  Start Date: 4/24/25  Stop Date: 5/22/25     #HLD managed on atorvastatin (continue) and ezetimibe (hold 24 hours before surgery)     #CHF managed on spironolactone and torsemide (continue both)    #Mitral valve regurgitation noted on Echo report in 2020  -->Patient has appointment to see his cardiologist Dr. Pardeep Bella at [facility] on 5/12, per note patient is cleared from a cardiac standpoint to have surgery   - Echo ordered for 5/13/25    EKG 5/12/25: (check media tab, under surgical clearance)  Atrial Fibrillation with normal mean ventricular response  Premature ventricular complexes or aberrantly conducted complexes  Abnormal ECG     He is scheduled for non-cardiac surgery associated with elevated risk. The patient has no major cardiac contraindications to non- cardiac surgery.    METS  The patient's functional capacity is less than 4 METS.  RCRI  The patient meets 1 RCRI criteria and therefore has a 6% risk of major adverse cardiac complications.  TOO score which indicates a 2.6% risk of intraoperative or 30-day postoperative MACE (major adverse cardiac event).    Pulmonary  #Chronic sinusitis managed on flonase (continue), no recent URI's     The patient is at increased risk of perioperative pulmonary complications secondary to neurosurgery, advanced age greater than 60. Recommend prioritizing non opioid analgesic techniques (regional and local anesthesia, nonsteroidal medications, etc) before the administration of opioids and close monitoring for hypoventilation after surgery due to MERVIN/supsected MERVIN. If intravenous narcotics are needed beyond the immediate parviz-operative period, the patient may  benefit from continuous pulse oximetry to monitor for hypoxic events till baseline Sp02 is normal on room air and  a respiratory therapy evaluation.    STOP BANG 5, which places patient at high risk for having MERVIN.  ARISCAT 11, low, 1.6% risk of in-hospital postoperative pulmonary complications  PRODIGY 27, high risk of respiratory depression episode.     Patient given PI sheet for preoperative deep breathing exercises.  Encourage  incentive spirometry in the postoperative period as deemed necessary.    Endocrine  #DM managed on glipizide (hold the morning of surgery), last A1C 6.3 on 5/9/25    Gastrointestinal  #GERD managed on omeprazole (continue) and famotidine (continue)    Eat 10- 0,  self-perceived oropharyngeal dysphagia scale (0-40)     Genitourinary  #BPH managed on tamsulosin (continue)     Renal  The patient has specific risk factors associated with increased risk of perioperative renal complications related to age greater than 55, male gender, hypertension, diabetes mellitus.    Hematology  #Anemia managed on ferrous sulfate, last hemoglobin 12.9 on 5/9/25    Caprini score 7, high risk of perioperative VTE.     Patient instructed to ambulate as soon as possible postoperatively to decrease thromboembolic risk. Initiate mechanical DVT prophylaxis as soon as possible and initiate chemical prophylaxis when deemed safe from a bleeding standpoint post surgery.     Transfusion Evaluation  T&S not obtained. Low likelihood for perioperative transfusion of blood or blood products.    Musculoskeletal  #Osteoarthritis in bilateral knees and lower back takes tramadol (do not take on the morning of surgery) and gabapentin (continue)    ID  MRSA screening obtained. Prescriptions and instructions given for Hibiclens and Peridex.    Diagnostic Results    CTH 5/5/25:  1. Ventricles are enlarged out of proportion to that of the cortical  sulci at the vertex; stable. Findings can be seen with normal  pressure hydrocephalus  in the correct clinical setting.  2. Remainder of examination is unremarkable.    Echo 5/13/25:    1. Left ventricular ejection fraction is mildly decreased, by visual estimate at 40-45%.   2. There is global hypokinesis of the left ventricle with minor regional variations.   3. Left ventricular diastolic filling is indeterminate.   4. There is normal right ventricular global systolic function.   5. The left atrial size is moderately dilated.   6. The right atrium is mild to moderately dilated.   7. Small to moderate pericardial effusion.   8. Moderate mitral valve regurgitation.   9. Mildly elevated right ventricular systolic pressure.  10. The inferior vena cava appears mildly dilated.  -Faxed report to patients cardiologist     Echo 2020  1.The EF is between 55-60%  2. Left atrium is severely dilated by volume  3. The right atrium is moderately enlarged  4. Mild mitral regurgitation is present  5. There is small, generalized pericardial effusion present     Recent Results (from the past 4 weeks)   CBC    Collection Time: 05/09/25  2:01 PM   Result Value Ref Range    WBC 8.0 4.4 - 11.3 x10*3/uL    nRBC 0.0 0.0 - 0.0 /100 WBCs    RBC 4.19 (L) 4.50 - 5.90 x10*6/uL    Hemoglobin 12.9 (L) 13.5 - 17.5 g/dL    Hematocrit 41.9 41.0 - 52.0 %     80 - 100 fL    MCH 30.8 26.0 - 34.0 pg    MCHC 30.8 (L) 32.0 - 36.0 g/dL    RDW 13.5 11.5 - 14.5 %    Platelets 203 150 - 450 x10*3/uL   Coagulation Screen    Collection Time: 05/09/25  2:01 PM   Result Value Ref Range    Protime 10.8 9.8 - 12.4 seconds    INR 1.0 0.9 - 1.1    aPTT 27 26 - 36 seconds   Basic Metabolic Panel    Collection Time: 05/09/25  2:01 PM   Result Value Ref Range    Glucose 96 74 - 99 mg/dL    Sodium 140 136 - 145 mmol/L    Potassium 4.2 3.5 - 5.3 mmol/L    Chloride 102 98 - 107 mmol/L    Bicarbonate 28 21 - 32 mmol/L    Anion Gap 14 10 - 20 mmol/L    Urea Nitrogen 17 6 - 23 mg/dL    Creatinine 0.82 0.50 - 1.30 mg/dL    eGFR >90 >60 mL/min/1.73m*2     Calcium 9.5 8.6 - 10.6 mg/dL   Staphylococcus aureus/MRSA colonization, Culture    Collection Time: 05/09/25  2:01 PM    Specimen: Nares/Axilla/Groin; Swab   Result Value Ref Range    Staph/MRSA Screen Culture No Staphylococcus aureus isolated    Hemoglobin A1C    Collection Time: 05/09/25  2:01 PM   Result Value Ref Range    Hemoglobin A1C 6.3 (H) See comment %    Estimated Average Glucose 134 Not Established mg/dL      Labs collected today: CBC, Coag, BMP, A1C and MRSA    -Preoperative medication instructions were provided and reviewed with the patient.  Any additional testing or evaluation was explained to the patient.  NPO Instructions were discussed, and the patient's questions were answered prior to conclusion of this encounter. Patient verbalized understanding of preoperative instructions. After Visit Summary given.                [1]   Past Medical History:  Diagnosis Date    Acute upper respiratory infection, unspecified 11/01/2013    Upper respiratory infection    Anxiety     Arthritis     BPH (benign prostatic hyperplasia)     CHF (congestive heart failure)     Chronic pain disorder     Cough, unspecified 11/01/2013    Cough    Dementia     Depression     Encounter for immunization 11/01/2013    Need for prophylactic vaccination and inoculation against influenza    GERD (gastroesophageal reflux disease)     Hydrocephalus     Hyperlipidemia     Hypertension     Irregular heart beat     Pain in unspecified shoulder 11/01/2013    Pain, joint, shoulder    Personal history of colonic polyps 04/22/2015    History of colonic polyps    Personal history of other diseases of the circulatory system 04/22/2015    History of congestive heart failure    Type 2 diabetes mellitus     Vision loss    [2]   Past Surgical History:  Procedure Laterality Date    COLONOSCOPY  11/20/2013    Colonoscopy (Fiberoptic)   [3]   Family History  Problem Relation Name Age of Onset    Colon cancer Mother      Heart attack Father       Diabetes Father     [4]   Allergies  Allergen Reactions    Amlodipine Swelling    Sulfamethoxazole Other     Pt ended up in the hospital, didn't remember why    Sulfamethoxazole-Trimethoprim Other

## 2025-05-09 NOTE — PREPROCEDURE INSTRUCTIONS
Thank you for visiting [facility] ([facility]) today for your pre-procedure evaluation, you were seen by     SHANE Davis  Department of Anesthesiology and Perioperative Medicine  Main phone [phone]  Fax [phone]    This summary includes instructions and information to aid you during your perioperative period.  Please read carefully. If you have any questions about your visit today, please call the number listed above.  If you become ill or have any changes to your health before your surgery, please contact your primary care provider and alert your surgeon.    General Medications Instructions (see back for further medication instructions)  Hold Vit D supplementation day of surgery  Hold all other vitamins and supplements 7 days prior to surgery  Tylenol okay to continue, please hold Aleve/naproxen/ibuprofen (NSAIDs) for 7 days prior to surgery  No lotion/moisturizers or Deoderant after last shower prior to surgery    You will be called business day prior to surgery to confirm arrival time.     Preparing for Surgery       Preoperative Fasting Guidelines  Why must I stop eating and drinking near surgery time?  With sedation, food or liquid in your stomach can enter your lungs causing serious complications  Food can increase nausea and vomiting  When do I need to stop eating and drinking before my surgery?  Do not eat any food after midnight the night before your surgery/procedure.  You may have up to 13.5 ounces of clear liquid until TWO hours before your instructed arrival time to the hospital.  This includes water, black tea/coffee, (no milk or cream) apple juice, and electrolyte drinks (Gatorade)  You may chew gum until TWO hours before your surgery/procedure    Tobacco and Alcohol;  Do not drink alcohol or smoke within 24 hours of surgery.  It is best to quit smoking for as long as possible before any surgery or procedure.       Other Instructions  Why did I have my  "nose, under my arms, and groin swabbed? The purpose of the swab is to identify Staphylococcus aureus inside your nose or on your skin.  The swab was sent to the laboratory for culture.  A positive swab/culture for Staphylococcus aureus is called colonization or carriage.   What is Staphylococcus aureus? Staphylococcus aureus, also known as \"staph\", is a germ found on the skin or in the nose of healthy people.  Sometimes Staphylococcus aureus can get into the body and cause an infection.  This can be minor (such as pimples, boils, or other skin problems).  It might also be serious (such as a blood infection, pneumonia, or a surgical site infection). What is Staphylococcus aureus colonization or carriage? Colonization or carriage means that a person has the germ but is not sick from it.  These bacteria can be spread on the hands or when breathing or sneezing. How is Staphylococcus aureus spread? It is most often spread by close contact with a person or item that carries it. What happens if my culture is positive for Staphylococcus aureus? Your doctor/medical team will use this information to guide any antibiotic treatment which may be necessary.  Regardless of the culture results, we will clean the inside of your nose with a betadine swab just before you have your surgery. Will I get an infection if I have Staphylococcus aureus in my nose or on my skin? Anyone can get an infection with Staphylococcus aureus.  However, the best way to reduce your risk of infection is to follow the instructions provided to you for the use of your CHG soap and dental rinse.  , Body Wash; What is a home preoperative antibacterial shower? This shower is a way of cleaning the skin with a germ-killing solution before surgery.  The solution contains chlorhexidine, commonly known as CHG.  CHG is a skin cleanser with germ-killing ability.  Let your doctor know if you are allergic to chlorhexidine. Why do I need to take a preoperative " antibacterial shower? Skin is not sterile.  It is best to try to make your skin as free of germs as possible before surgery.  Proper cleansing with a germ-killing soap before surgery can lower the number of germs on your skin.  This helps to reduce the risk of infection at the surgical site.  Following the instructions listed below will help you prepare your skin for surgery.   How do I use the solution? Steps:  Begin using your CHG soap 5 days before your scheduled surgery on ___________.   First, wash and rinse your hair using the CHG soap. Keep CHG soap away from ear canals and eyes.  Rinse completely, do not condition.  Hair extensions should be removed. , Oral/Dental Rinse: What is oral/dental rinse?  It is a mouthwash. It is a way of cleaning the mouth with a germ-killing solution before your surgery.  The solution contains chlorhexidine, commonly known as CHG. It is used inside the mouth to kill a bacteria known as Staphylococcus aureus.  Let your doctor know if you are allergic to Chlorhexidine. Why do I need to use CHG oral/dental rinse? The CHG oral/dental rinse helps to kill a bacteria in your mouth known as Staphylococcus aureus.  This reduces the risk of infection at the surgical site.  Using your CHG oral/dental rinse STEPS: Use your CHG oral/dental rinse after you brush your teeth the night before (at bedtime) and the morning of your surgery.  Follow all directions on your prescription label.  Use the cap on the container to measure 15 ml.  Swish (gargle if you can) the mouthwash in your mouth for at least 30 seconds, (do not swallow) and spit out.  After you use your CHG rinse, do not rinse your mouth with water, drink or eat.  Please refer to the prescription label for the appropriate time to resume oral intake What side effects might I have using the CHG oral/dental rinse? CHG rinse will stick to plaque on the teeth.  Brush and floss just before use.  Teeth brushing will help avoid staining of  plaque during use.       The Week before Surgery        Seven days before Surgery  Check your C[facility] medication instructions  Do the exercises provided to you by CPM   Arrange for a responsible, adult licensed  to take you home after surgery and stay with you for 24 hours.  You will not be permitted to drive yourself home if you have received any anesthetic/sedation  Five days before surgery  Check your [facility] medication instructions  Do the exercises provided to you by C[facility]   Start using Chlorhexidene (CHG) body wash if prescribed (Continue till day of surgery)      The Day before Surgery       Check your CPM medication and all other CPM instructions including when to stop eating and drinking  You will be called with your arrival time for surgery in the late afternoon.  If you do not receive a call please reach out to your surgeon's office.  Do not smoke or drink 24 hours before surgery  Prepare items to bring with you to the hospital  Shower with your chlorhexidine wash if prescribed  Brush your teeth and use your chlorhexidine dental rinse if prescribed    The Day of Surgery       Check your [facility] medication instructions  Shower, if prescribed use CHG.  Do not apply any lotions, creams, moisturizers, perfume or deodorant  Brush your teeth and use your CHG dental rinse if prescribed  Wear loose comfortable clothing  Avoid make-up  Remove  jewelry and piercings, consider professional piercing removal with a plastic spacer if needed  Bring photo ID and Insurance card  Bring an accurate medication list that includes medication dose, frequency and allergies  Bring a copy of your advanced directives (will, health care power of )  Bring any devices and controllers as well as medical devices you have been provided with for surgery (CPAP, slings, braces, etc.)  Dentures, eyeglasses, and contacts will be removed before surgery, please bring cases for contacts or glasses    Preoperative Deep Breathing Exercises    Why  it is important to do deep breathing exercises before my surgery?  Deep breathing exercises strengthen your breathing muscles.  This helps you to recover after your surgery and decreases the chance of breathing complications.    How are the deep breathing exercises done?  Sit straight with your back supported.  Breathe in deeply and slowly through your nose. Your lower rib cage should expand and your abdomen may move forward.  Hold that breath for 3 to 5 seconds.  Breathe out through pursed lips, slowly and completely.  Rest and repeat 10 times every hour while awake.  Rest longer if you become dizzy or lightheaded.      Preoperative Brain Exercises    What are brain exercises?  A brain exercise is any activity that engages your thinking (cognitive) skills.    What types of activities are considered brain exercises?  Jigsaw puzzles, crossword puzzles, word jumble, memory games, word search, and many more.  Many can be found free online or on your phone via a mobile luisa.    Why should I do brain exercises before my surgery?  More recent research has shown brain exercise before surgery can lower the risk of postoperative delirium (confusion) which can be especially important for older adults.  Patients who did brain exercises for 5 to 10 hours the days before surgery, cut their risk of postoperative delirium in half up to 1 week after surgery.    Sit-to-Stand Exercise    What is the sit-to-stand exercise?  The sit-to-stand exercise strengthens the muscles of your lower body and muscles in the center of your body (core muscles for stability) helping to maintain and improve your strength and mobility.  How do I do the sit-to-stand exercise?  The goal is to do this exercise without using your arms or hands.  If this is too difficult, use your arms and hands or a chair with armrests to help slowly push yourself to the standing position and lower yourself back to the sitting position. As the movement becomes easier use  your arms and hands less.    Steps to the sit-to-stand exercise  Sit up tall in a sturdy chair, knees bent, feet flat on the floor shoulder-width apart.  Shift your hips/pelvis forward in the chair to correctly position yourself for the next movement.  Lean forward at your hips.  Stand up straight putting equal weight on both feet.  Check to be sure you are properly aligned with the chair, in a slow controlled movement sit back down.  Repeat this exercise 10-15 times.  If needed you can do it fewer times until your strength improves.  Rest for 1 minute.  Do another 10-15 sit-to-stand exercises.  Try to do this in the morning and evening.       Simple things you can do to help prevent blood clots     Blood clots are blockages that can form in the body's veins. When a blood clot forms in your deep veins, it may be called a deep vein thrombosis, or DVT for short. Blood clots can happen in any part of the body where blood flows, but they are most common in the arms and legs. If a piece of a blood clot breaks free and travels to the lungs, it is called a pulmonary embolus (PE). A PE can be a very serious problem.      Being in the hospital or having surgery can raise your chances of getting a blood clot because you may not be well enough to move around as much as you normally do.         Ways you can help prevent blood clots in the hospital       Wearing SCDs  SCDs stands for Sequential Compression Devices.   SCDs are special sleeves that wrap around your legs. They attach to a pump that fills them with air to gently squeeze your legs every few minutes.  This helps return the blood in your legs to your heart.   SCDs should only be taken off when walking or bathing. SCDs may not be comfortable, but they can help save your life.              Pump SCD leg sleeves  Wearing compression stockings - if your doctor orders them. These special snug-fitting stockings gently squeeze your legs to help blood flow.       Walking.  Walking helps move the blood in your legs.   If your doctor says it is ok, try walking the halls at least   5 times a day. Ask us to help you get up, so you don't fall.      Taking any blood-thinning medicines your doctor orders.              Ways you can help prevent blood clots at home         Wearing compression stockings - if your doctor orders them.   Walking - to help move the blood in your legs.    Taking any blood-thinning medicines your doctor orders.      Signs of a blood clot or PE    Tell your doctor or nurse right away if you have any of the problems listed below.         If you are at home, seek medical care right away. Call 911 for chest pain or problems breathing.            Signs of a blood clot (DVT) - such as pain, swelling, redness, or warmth in your arm or legs.  Signs of a pulmonary embolism (PE) - such as chest pain or feeling short of breath

## 2025-05-11 LAB — STAPHYLOCOCCUS SPEC CULT: NORMAL

## 2025-05-12 ENCOUNTER — PREP FOR PROCEDURE (OUTPATIENT)
Dept: PAIN MEDICINE | Facility: CLINIC | Age: 75
End: 2025-05-12
Payer: MEDICARE

## 2025-05-12 ENCOUNTER — APPOINTMENT (OUTPATIENT)
Dept: RADIOLOGY | Facility: HOSPITAL | Age: 75
End: 2025-05-12
Payer: MEDICARE

## 2025-05-12 DIAGNOSIS — Z00.6 RESEARCH SUBJECT: Primary | ICD-10-CM

## 2025-05-13 ENCOUNTER — HOSPITAL ENCOUNTER (OUTPATIENT)
Dept: CARDIOLOGY | Facility: CLINIC | Age: 75
Discharge: HOME | End: 2025-05-13
Payer: MEDICARE

## 2025-05-13 DIAGNOSIS — I34.0 MITRAL VALVE INSUFFICIENCY, UNSPECIFIED ETIOLOGY: ICD-10-CM

## 2025-05-13 DIAGNOSIS — G91.2 NPH (NORMAL PRESSURE HYDROCEPHALUS) (MULTI): ICD-10-CM

## 2025-05-13 LAB
AORTIC VALVE MEAN GRADIENT: 6 MMHG
AORTIC VALVE PEAK VELOCITY: 1.65 M/S
AV PEAK GRADIENT: 11 MMHG
AVA (PEAK VEL): 2.04 CM2
AVA (VTI): 1.82 CM2
EJECTION FRACTION APICAL 4 CHAMBER: 54.8
EJECTION FRACTION: 43 %
LEFT ATRIUM VOLUME AREA LENGTH INDEX BSA: 58.3 ML/M2
LEFT VENTRICLE INTERNAL DIMENSION DIASTOLE: 4.61 CM (ref 3.5–6)
LEFT VENTRICULAR OUTFLOW TRACT DIAMETER: 2.2 CM
RIGHT VENTRICLE FREE WALL PEAK S': 0.16 CM/S
RIGHT VENTRICLE PEAK SYSTOLIC PRESSURE: 39.8 MMHG
TRICUSPID ANNULAR PLANE SYSTOLIC EXCURSION: 1.6 CM

## 2025-05-13 PROCEDURE — 93306 TTE W/DOPPLER COMPLETE: CPT | Performed by: INTERNAL MEDICINE

## 2025-05-13 PROCEDURE — 93306 TTE W/DOPPLER COMPLETE: CPT

## 2025-05-14 ENCOUNTER — ANESTHESIA EVENT (OUTPATIENT)
Dept: OPERATING ROOM | Facility: HOSPITAL | Age: 75
End: 2025-05-14
Payer: MEDICARE

## 2025-05-14 RX ORDER — HYDROCODONE BITARTRATE AND ACETAMINOPHEN 5; 325 MG/1; MG/1
1 TABLET ORAL 2 TIMES DAILY PRN
COMMUNITY
End: 2025-05-19 | Stop reason: HOSPADM

## 2025-05-14 NOTE — PROGRESS NOTES
"Pharmacy Medication History Review    Nahid Gastelum is a 75 y.o. male who is planned to be admitted for NPH (normal pressure hydrocephalus) (Multi). Pharmacy called the patient prior to their scheduled procedure and reviewed the patient's [id number] medications for accuracy.    Medications ADDED:  Norco 5-325mg  Medications CHANGED:  Tramadol 50mg directions from #1Q6h TO #1BIDprn  Minocycline 100mg marked \"not taking\" on 05/09/25 to IS taking daily  Medications REMOVED:   Carvedilol 25mg  Gabapentin 600mg  Glipizide XL 2.5mg  Ketoconazole  Torsemide 20mg    Please review updated prior to admission medication list and comments regarding how patient may be taking medications differently by going to Admission tab --> Admission Orders --> Admit Orders / Review prior to admission medications.     Preferred pharmacy, last doses of medications, and allergies to be confirmed with patient by nursing the day of procedure.     Sources used to complete the med history include:  [place]  Pharmacy dispense history  Spouse  Good historian  Chart Review  Care Everywhere     Below are additional concerns with the patient's PTA list.  States patient received a prescription of norco 5-325 on 05/12/25 #60/30d and [place] verified. Patient is taking as needed. Directions state #1BIDprn. Does NOT take with tramadol. Takes one or the other depending on level of pain  States patient IS taking #1 capsule of minocycline 100mg once daily. L.F. 01/03/25 #90/90d  Patient states warfarin 4mg is on hold at this time in preparation of procedure and is currenting injecting enoxaparin 60mg twice daily.     Abby Napoles [facility]  Please reach out via Secure Chat for questions     "

## 2025-05-15 ENCOUNTER — APPOINTMENT (OUTPATIENT)
Dept: RADIOLOGY | Facility: HOSPITAL | Age: 75
DRG: 033 | End: 2025-05-15
Payer: MEDICARE

## 2025-05-15 ENCOUNTER — ANESTHESIA (OUTPATIENT)
Dept: OPERATING ROOM | Facility: HOSPITAL | Age: 75
End: 2025-05-15
Payer: MEDICARE

## 2025-05-15 ENCOUNTER — HOSPITAL ENCOUNTER (INPATIENT)
Facility: HOSPITAL | Age: 75
DRG: 033 | End: 2025-05-15
Attending: NEUROLOGICAL SURGERY | Admitting: NEUROLOGICAL SURGERY
Payer: MEDICARE

## 2025-05-15 DIAGNOSIS — G91.2 NPH (NORMAL PRESSURE HYDROCEPHALUS) (MULTI): Primary | ICD-10-CM

## 2025-05-15 PROBLEM — I10 HTN (HYPERTENSION): Status: ACTIVE | Noted: 2025-05-15

## 2025-05-15 PROBLEM — I50.9 CHF (CONGESTIVE HEART FAILURE): Status: ACTIVE | Noted: 2025-05-15

## 2025-05-15 PROBLEM — K21.9 GASTROESOPHAGEAL REFLUX DISEASE: Status: ACTIVE | Noted: 2025-05-15

## 2025-05-15 PROBLEM — I48.91 ATRIAL FIBRILLATION (MULTI): Status: ACTIVE | Noted: 2025-05-15

## 2025-05-15 PROBLEM — F03.90 DEMENTIA: Status: ACTIVE | Noted: 2025-05-15

## 2025-05-15 PROBLEM — E11.9 DIABETES MELLITUS, TYPE 2 (MULTI): Status: ACTIVE | Noted: 2025-05-15

## 2025-05-15 LAB
ABO GROUP (TYPE) IN BLOOD: NORMAL
ABO GROUP (TYPE) IN BLOOD: NORMAL
ANTIBODY SCREEN: NORMAL
GLUCOSE BLD MANUAL STRIP-MCNC: 122 MG/DL (ref 74–99)
GLUCOSE BLD MANUAL STRIP-MCNC: 148 MG/DL (ref 74–99)
GLUCOSE BLD MANUAL STRIP-MCNC: 181 MG/DL (ref 74–99)
GLUCOSE BLD MANUAL STRIP-MCNC: 197 MG/DL (ref 74–99)
GLUCOSE BLD MANUAL STRIP-MCNC: 228 MG/DL (ref 74–99)
GLUCOSE BLD MANUAL STRIP-MCNC: 241 MG/DL (ref 74–99)
RH FACTOR (ANTIGEN D): NORMAL
RH FACTOR (ANTIGEN D): NORMAL

## 2025-05-15 PROCEDURE — A62223 PR CREATE SHUNT:VENTRIC-PERITONEAL: Performed by: ANESTHESIOLOGY

## 2025-05-15 PROCEDURE — 1200000002 HC GENERAL ROOM WITH TELEMETRY DAILY

## 2025-05-15 PROCEDURE — 3700000001 HC GENERAL ANESTHESIA TIME - INITIAL BASE CHARGE: Performed by: NEUROLOGICAL SURGERY

## 2025-05-15 PROCEDURE — 7100000001 HC RECOVERY ROOM TIME - INITIAL BASE CHARGE: Performed by: NEUROLOGICAL SURGERY

## 2025-05-15 PROCEDURE — C1894 INTRO/SHEATH, NON-LASER: HCPCS | Performed by: NEUROLOGICAL SURGERY

## 2025-05-15 PROCEDURE — 2500000004 HC RX 250 GENERAL PHARMACY W/ HCPCS (ALT 636 FOR OP/ED): Performed by: PHYSICIAN ASSISTANT

## 2025-05-15 PROCEDURE — 3700000002 HC GENERAL ANESTHESIA TIME - EACH INCREMENTAL 1 MINUTE: Performed by: NEUROLOGICAL SURGERY

## 2025-05-15 PROCEDURE — 2500000004 HC RX 250 GENERAL PHARMACY W/ HCPCS (ALT 636 FOR OP/ED)

## 2025-05-15 PROCEDURE — C1729 CATH, DRAINAGE: HCPCS | Performed by: NEUROLOGICAL SURGERY

## 2025-05-15 PROCEDURE — 00160J6 BYPASS CEREBRAL VENTRICLE TO PERITONEAL CAVITY WITH SYNTHETIC SUBSTITUTE, OPEN APPROACH: ICD-10-PCS | Performed by: NEUROLOGICAL SURGERY

## 2025-05-15 PROCEDURE — 99100 ANES PT EXTEME AGE<1 YR&>70: CPT | Performed by: ANESTHESIOLOGY

## 2025-05-15 PROCEDURE — 2500000005 HC RX 250 GENERAL PHARMACY W/O HCPCS: Mod: JZ | Performed by: NEUROLOGICAL SURGERY

## 2025-05-15 PROCEDURE — 2500000004 HC RX 250 GENERAL PHARMACY W/ HCPCS (ALT 636 FOR OP/ED): Performed by: NEUROLOGICAL SURGERY

## 2025-05-15 PROCEDURE — 61781 SCAN PROC CRANIAL INTRA: CPT | Performed by: NEUROLOGICAL SURGERY

## 2025-05-15 PROCEDURE — 82947 ASSAY GLUCOSE BLOOD QUANT: CPT

## 2025-05-15 PROCEDURE — 70450 CT HEAD/BRAIN W/O DYE: CPT | Performed by: RADIOLOGY

## 2025-05-15 PROCEDURE — 2500000002 HC RX 250 W HCPCS SELF ADMINISTERED DRUGS (ALT 637 FOR MEDICARE OP, ALT 636 FOR OP/ED)

## 2025-05-15 PROCEDURE — 36415 COLL VENOUS BLD VENIPUNCTURE: CPT | Performed by: ANESTHESIOLOGY

## 2025-05-15 PROCEDURE — 70450 CT HEAD/BRAIN W/O DYE: CPT

## 2025-05-15 PROCEDURE — 7100000002 HC RECOVERY ROOM TIME - EACH INCREMENTAL 1 MINUTE: Performed by: NEUROLOGICAL SURGERY

## 2025-05-15 PROCEDURE — 2500000001 HC RX 250 WO HCPCS SELF ADMINISTERED DRUGS (ALT 637 FOR MEDICARE OP)

## 2025-05-15 PROCEDURE — 86901 BLOOD TYPING SEROLOGIC RH(D): CPT | Performed by: ANESTHESIOLOGY

## 2025-05-15 PROCEDURE — 62223 ESTABLISH BRAIN CAVITY SHUNT: CPT | Performed by: NEUROLOGICAL SURGERY

## 2025-05-15 PROCEDURE — 2500000004 HC RX 250 GENERAL PHARMACY W/ HCPCS (ALT 636 FOR OP/ED): Mod: JZ | Performed by: ANESTHESIOLOGY

## 2025-05-15 PROCEDURE — 3600000009 HC OR TIME - EACH INCREMENTAL 1 MINUTE - PROCEDURE LEVEL FOUR: Performed by: NEUROLOGICAL SURGERY

## 2025-05-15 PROCEDURE — 2780000003 HC OR 278 NO HCPCS: Performed by: NEUROLOGICAL SURGERY

## 2025-05-15 PROCEDURE — 3600000004 HC OR TIME - INITIAL BASE CHARGE - PROCEDURE LEVEL FOUR: Performed by: NEUROLOGICAL SURGERY

## 2025-05-15 PROCEDURE — 2720000007 HC OR 272 NO HCPCS: Performed by: NEUROLOGICAL SURGERY

## 2025-05-15 DEVICE — IMPLANTABLE DEVICE: Type: IMPLANTABLE DEVICE | Site: BRAIN | Status: FUNCTIONAL

## 2025-05-15 DEVICE — RESERVOIR, RICKHAM, LARGE: Type: IMPLANTABLE DEVICE | Site: BRAIN | Status: FUNCTIONAL

## 2025-05-15 DEVICE — VALVE, CERTAS PLUS, PROGRAMMABLE: Type: IMPLANTABLE DEVICE | Site: BRAIN | Status: FUNCTIONAL

## 2025-05-15 RX ORDER — OXYCODONE HYDROCHLORIDE 5 MG/1
5 TABLET ORAL EVERY 4 HOURS PRN
Status: DISCONTINUED | OUTPATIENT
Start: 2025-05-15 | End: 2025-05-19 | Stop reason: HOSPADM

## 2025-05-15 RX ORDER — NALOXONE HYDROCHLORIDE 0.4 MG/ML
0.2 INJECTION, SOLUTION INTRAMUSCULAR; INTRAVENOUS; SUBCUTANEOUS EVERY 5 MIN PRN
Status: DISCONTINUED | OUTPATIENT
Start: 2025-05-15 | End: 2025-05-19 | Stop reason: HOSPADM

## 2025-05-15 RX ORDER — ONDANSETRON 4 MG/1
4 TABLET, FILM COATED ORAL EVERY 8 HOURS PRN
Status: DISCONTINUED | OUTPATIENT
Start: 2025-05-15 | End: 2025-05-19 | Stop reason: HOSPADM

## 2025-05-15 RX ORDER — ASCORBIC ACID 500 MG
250 TABLET ORAL DAILY
Status: DISCONTINUED | OUTPATIENT
Start: 2025-05-15 | End: 2025-05-19 | Stop reason: HOSPADM

## 2025-05-15 RX ORDER — CEFAZOLIN 1 G/1
INJECTION, POWDER, FOR SOLUTION INTRAVENOUS AS NEEDED
Status: DISCONTINUED | OUTPATIENT
Start: 2025-05-15 | End: 2025-05-15

## 2025-05-15 RX ORDER — ESMOLOL HYDROCHLORIDE 10 MG/ML
INJECTION INTRAVENOUS AS NEEDED
Status: DISCONTINUED | OUTPATIENT
Start: 2025-05-15 | End: 2025-05-15

## 2025-05-15 RX ORDER — CEFAZOLIN SODIUM 2 G/100ML
2 INJECTION, SOLUTION INTRAVENOUS ONCE
Status: DISCONTINUED | OUTPATIENT
Start: 2025-05-15 | End: 2025-05-15 | Stop reason: HOSPADM

## 2025-05-15 RX ORDER — FENTANYL CITRATE 50 UG/ML
25 INJECTION, SOLUTION INTRAMUSCULAR; INTRAVENOUS EVERY 5 MIN PRN
Status: DISCONTINUED | OUTPATIENT
Start: 2025-05-15 | End: 2025-05-15 | Stop reason: HOSPADM

## 2025-05-15 RX ORDER — LANOLIN ALCOHOL/MO/W.PET/CERES
400 CREAM (GRAM) TOPICAL DAILY
Status: DISCONTINUED | OUTPATIENT
Start: 2025-05-15 | End: 2025-05-19 | Stop reason: HOSPADM

## 2025-05-15 RX ORDER — SODIUM CHLORIDE 9 MG/ML
75 INJECTION, SOLUTION INTRAVENOUS CONTINUOUS
Status: ACTIVE | OUTPATIENT
Start: 2025-05-15 | End: 2025-05-16

## 2025-05-15 RX ORDER — IBUPROFEN 200 MG
400 CAPSULE ORAL DAILY
Status: DISCONTINUED | OUTPATIENT
Start: 2025-05-15 | End: 2025-05-19 | Stop reason: HOSPADM

## 2025-05-15 RX ORDER — DEXTROSE 50 % IN WATER (D50W) INTRAVENOUS SYRINGE
25
Status: DISCONTINUED | OUTPATIENT
Start: 2025-05-15 | End: 2025-05-19 | Stop reason: HOSPADM

## 2025-05-15 RX ORDER — FERROUS SULFATE 325(65) MG
65 TABLET ORAL DAILY
Status: DISCONTINUED | OUTPATIENT
Start: 2025-05-15 | End: 2025-05-19 | Stop reason: HOSPADM

## 2025-05-15 RX ORDER — ONDANSETRON HYDROCHLORIDE 2 MG/ML
INJECTION, SOLUTION INTRAVENOUS AS NEEDED
Status: DISCONTINUED | OUTPATIENT
Start: 2025-05-15 | End: 2025-05-15

## 2025-05-15 RX ORDER — DONEPEZIL HYDROCHLORIDE 10 MG/1
10 TABLET, FILM COATED ORAL NIGHTLY
Status: DISCONTINUED | OUTPATIENT
Start: 2025-05-16 | End: 2025-05-19 | Stop reason: HOSPADM

## 2025-05-15 RX ORDER — AMOXICILLIN 250 MG
2 CAPSULE ORAL 2 TIMES DAILY
Status: DISCONTINUED | OUTPATIENT
Start: 2025-05-15 | End: 2025-05-19 | Stop reason: HOSPADM

## 2025-05-15 RX ORDER — ENOXAPARIN SODIUM 100 MG/ML
60 INJECTION SUBCUTANEOUS 2 TIMES DAILY
Status: DISCONTINUED | OUTPATIENT
Start: 2025-05-15 | End: 2025-05-19 | Stop reason: HOSPADM

## 2025-05-15 RX ORDER — OXYCODONE HYDROCHLORIDE 10 MG/1
10 TABLET ORAL EVERY 4 HOURS PRN
Status: DISCONTINUED | OUTPATIENT
Start: 2025-05-15 | End: 2025-05-16

## 2025-05-15 RX ORDER — METOPROLOL TARTRATE 25 MG/1
12.5 TABLET, FILM COATED ORAL 2 TIMES DAILY
Status: DISCONTINUED | OUTPATIENT
Start: 2025-05-15 | End: 2025-05-19 | Stop reason: HOSPADM

## 2025-05-15 RX ORDER — POLYETHYLENE GLYCOL 3350 17 G/17G
17 POWDER, FOR SOLUTION ORAL EVERY 12 HOURS
Status: DISCONTINUED | OUTPATIENT
Start: 2025-05-15 | End: 2025-05-19 | Stop reason: HOSPADM

## 2025-05-15 RX ORDER — FENTANYL CITRATE 50 UG/ML
INJECTION, SOLUTION INTRAMUSCULAR; INTRAVENOUS AS NEEDED
Status: DISCONTINUED | OUTPATIENT
Start: 2025-05-15 | End: 2025-05-15

## 2025-05-15 RX ORDER — DEXAMETHASONE SODIUM PHOSPHATE 10 MG/ML
INJECTION INTRAMUSCULAR; INTRAVENOUS AS NEEDED
Status: DISCONTINUED | OUTPATIENT
Start: 2025-05-15 | End: 2025-05-15

## 2025-05-15 RX ORDER — PROPOFOL 10 MG/ML
INJECTION, EMULSION INTRAVENOUS AS NEEDED
Status: DISCONTINUED | OUTPATIENT
Start: 2025-05-15 | End: 2025-05-15

## 2025-05-15 RX ORDER — ACETAMINOPHEN 10 MG/ML
INJECTION, SOLUTION INTRAVENOUS AS NEEDED
Status: DISCONTINUED | OUTPATIENT
Start: 2025-05-15 | End: 2025-05-15

## 2025-05-15 RX ORDER — LIDOCAINE HYDROCHLORIDE 20 MG/ML
INJECTION, SOLUTION INFILTRATION; PERINEURAL AS NEEDED
Status: DISCONTINUED | OUTPATIENT
Start: 2025-05-15 | End: 2025-05-15

## 2025-05-15 RX ORDER — ACETAMINOPHEN 325 MG/1
650 TABLET ORAL EVERY 6 HOURS
Status: DISCONTINUED | OUTPATIENT
Start: 2025-05-15 | End: 2025-05-19 | Stop reason: HOSPADM

## 2025-05-15 RX ORDER — LIDOCAINE HYDROCHLORIDE AND EPINEPHRINE 5; 5 MG/ML; UG/ML
INJECTION, SOLUTION INFILTRATION; PERINEURAL AS NEEDED
Status: DISCONTINUED | OUTPATIENT
Start: 2025-05-15 | End: 2025-05-15 | Stop reason: HOSPADM

## 2025-05-15 RX ORDER — LABETALOL HYDROCHLORIDE 5 MG/ML
10 INJECTION, SOLUTION INTRAVENOUS EVERY 10 MIN PRN
Status: DISCONTINUED | OUTPATIENT
Start: 2025-05-15 | End: 2025-05-15

## 2025-05-15 RX ORDER — EZETIMIBE 10 MG/1
10 TABLET ORAL DAILY
Status: DISCONTINUED | OUTPATIENT
Start: 2025-05-15 | End: 2025-05-19 | Stop reason: HOSPADM

## 2025-05-15 RX ORDER — ROCURONIUM BROMIDE 10 MG/ML
INJECTION, SOLUTION INTRAVENOUS AS NEEDED
Status: DISCONTINUED | OUTPATIENT
Start: 2025-05-15 | End: 2025-05-15

## 2025-05-15 RX ORDER — NITROGLYCERIN 40 MG/100ML
INJECTION INTRAVENOUS AS NEEDED
Status: DISCONTINUED | OUTPATIENT
Start: 2025-05-15 | End: 2025-05-15

## 2025-05-15 RX ORDER — FAMOTIDINE 40 MG/1
40 TABLET, FILM COATED ORAL NIGHTLY PRN
Status: DISCONTINUED | OUTPATIENT
Start: 2025-05-15 | End: 2025-05-19 | Stop reason: HOSPADM

## 2025-05-15 RX ORDER — HEPARIN SODIUM 5000 [USP'U]/ML
5000 INJECTION, SOLUTION INTRAVENOUS; SUBCUTANEOUS EVERY 8 HOURS
Status: DISCONTINUED | OUTPATIENT
Start: 2025-05-17 | End: 2025-05-17

## 2025-05-15 RX ORDER — LABETALOL HYDROCHLORIDE 5 MG/ML
10 INJECTION, SOLUTION INTRAVENOUS EVERY 10 MIN PRN
Status: DISCONTINUED | OUTPATIENT
Start: 2025-05-15 | End: 2025-05-16

## 2025-05-15 RX ORDER — SODIUM CHLORIDE, SODIUM LACTATE, POTASSIUM CHLORIDE, CALCIUM CHLORIDE 600; 310; 30; 20 MG/100ML; MG/100ML; MG/100ML; MG/100ML
INJECTION, SOLUTION INTRAVENOUS CONTINUOUS PRN
Status: DISCONTINUED | OUTPATIENT
Start: 2025-05-15 | End: 2025-05-15

## 2025-05-15 RX ORDER — ATORVASTATIN CALCIUM 80 MG/1
80 TABLET, FILM COATED ORAL DAILY
Status: DISCONTINUED | OUTPATIENT
Start: 2025-05-16 | End: 2025-05-19 | Stop reason: HOSPADM

## 2025-05-15 RX ORDER — FLUTICASONE PROPIONATE 50 MCG
1 SPRAY, SUSPENSION (ML) NASAL
Status: DISCONTINUED | OUTPATIENT
Start: 2025-05-15 | End: 2025-05-19 | Stop reason: HOSPADM

## 2025-05-15 RX ORDER — INSULIN LISPRO 100 [IU]/ML
0-10 INJECTION, SOLUTION INTRAVENOUS; SUBCUTANEOUS
Status: DISCONTINUED | OUTPATIENT
Start: 2025-05-15 | End: 2025-05-19 | Stop reason: HOSPADM

## 2025-05-15 RX ORDER — LIDOCAINE HYDROCHLORIDE 10 MG/ML
0.1 INJECTION, SOLUTION INFILTRATION; PERINEURAL ONCE
Status: DISCONTINUED | OUTPATIENT
Start: 2025-05-15 | End: 2025-05-15 | Stop reason: HOSPADM

## 2025-05-15 RX ORDER — HYDRALAZINE HYDROCHLORIDE 20 MG/ML
10 INJECTION INTRAMUSCULAR; INTRAVENOUS
Status: DISCONTINUED | OUTPATIENT
Start: 2025-05-15 | End: 2025-05-16

## 2025-05-15 RX ORDER — HYDRALAZINE HYDROCHLORIDE 20 MG/ML
10 INJECTION INTRAMUSCULAR; INTRAVENOUS
Status: DISCONTINUED | OUTPATIENT
Start: 2025-05-15 | End: 2025-05-15

## 2025-05-15 RX ORDER — ONDANSETRON HYDROCHLORIDE 2 MG/ML
4 INJECTION, SOLUTION INTRAVENOUS EVERY 8 HOURS PRN
Status: DISCONTINUED | OUTPATIENT
Start: 2025-05-15 | End: 2025-05-19 | Stop reason: HOSPADM

## 2025-05-15 RX ORDER — SODIUM CHLORIDE 0.9 G/100ML
INJECTION, SOLUTION IRRIGATION AS NEEDED
Status: DISCONTINUED | OUTPATIENT
Start: 2025-05-15 | End: 2025-05-15 | Stop reason: HOSPADM

## 2025-05-15 RX ORDER — DEXTROSE 50 % IN WATER (D50W) INTRAVENOUS SYRINGE
12.5
Status: DISCONTINUED | OUTPATIENT
Start: 2025-05-15 | End: 2025-05-19 | Stop reason: HOSPADM

## 2025-05-15 RX ORDER — SODIUM CHLORIDE, SODIUM LACTATE, POTASSIUM CHLORIDE, CALCIUM CHLORIDE 600; 310; 30; 20 MG/100ML; MG/100ML; MG/100ML; MG/100ML
50 INJECTION, SOLUTION INTRAVENOUS CONTINUOUS
Status: ACTIVE | OUTPATIENT
Start: 2025-05-15 | End: 2025-05-16

## 2025-05-15 RX ORDER — WARFARIN 4 MG/1
4 TABLET ORAL DAILY
Status: DISCONTINUED | OUTPATIENT
Start: 2025-05-15 | End: 2025-05-19 | Stop reason: HOSPADM

## 2025-05-15 RX ORDER — ONDANSETRON HYDROCHLORIDE 2 MG/ML
4 INJECTION, SOLUTION INTRAVENOUS ONCE AS NEEDED
Status: DISCONTINUED | OUTPATIENT
Start: 2025-05-15 | End: 2025-05-15 | Stop reason: HOSPADM

## 2025-05-15 RX ADMIN — INSULIN LISPRO 4 UNITS: 100 INJECTION, SOLUTION INTRAVENOUS; SUBCUTANEOUS at 18:16

## 2025-05-15 RX ADMIN — PROPOFOL 50 MG: 10 INJECTION, EMULSION INTRAVENOUS at 07:39

## 2025-05-15 RX ADMIN — METOPROLOL TARTRATE 12.5 MG: 25 TABLET, FILM COATED ORAL at 20:22

## 2025-05-15 RX ADMIN — OXYCODONE HYDROCHLORIDE 10 MG: 10 TABLET ORAL at 20:24

## 2025-05-15 RX ADMIN — ACETAMINOPHEN 1000 MG: 10 INJECTION, SOLUTION INTRAVENOUS at 09:25

## 2025-05-15 RX ADMIN — ACETAMINOPHEN 650 MG: 325 TABLET, FILM COATED ORAL at 18:16

## 2025-05-15 RX ADMIN — PROPOFOL 30 MG: 10 INJECTION, EMULSION INTRAVENOUS at 10:09

## 2025-05-15 RX ADMIN — ONDANSETRON 4 MG: 2 INJECTION, SOLUTION INTRAMUSCULAR; INTRAVENOUS at 09:40

## 2025-05-15 RX ADMIN — SODIUM CHLORIDE, SODIUM LACTATE, POTASSIUM CHLORIDE, AND CALCIUM CHLORIDE 50 ML/HR: .6; .31; .03; .02 INJECTION, SOLUTION INTRAVENOUS at 10:35

## 2025-05-15 RX ADMIN — ESMOLOL HYDROCHLORIDE 30 MG: 100 INJECTION, SOLUTION INTRAVENOUS at 07:39

## 2025-05-15 RX ADMIN — PROPOFOL 50 MG: 10 INJECTION, EMULSION INTRAVENOUS at 07:42

## 2025-05-15 RX ADMIN — ESMOLOL HYDROCHLORIDE 30 MG: 100 INJECTION, SOLUTION INTRAVENOUS at 07:45

## 2025-05-15 RX ADMIN — DEXAMETHASONE SODIUM PHOSPHATE 4 MG: 10 INJECTION INTRAMUSCULAR; INTRAVENOUS at 07:55

## 2025-05-15 RX ADMIN — SENNOSIDES AND DOCUSATE SODIUM 2 TABLET: 50; 8.6 TABLET ORAL at 20:21

## 2025-05-15 RX ADMIN — ESMOLOL HYDROCHLORIDE 30 MG: 100 INJECTION, SOLUTION INTRAVENOUS at 09:00

## 2025-05-15 RX ADMIN — PROPOFOL 50 MG: 10 INJECTION, EMULSION INTRAVENOUS at 08:50

## 2025-05-15 RX ADMIN — FENTANYL CITRATE 25 MCG: 50 INJECTION, SOLUTION INTRAMUSCULAR; INTRAVENOUS at 10:48

## 2025-05-15 RX ADMIN — FENTANYL CITRATE 25 MCG: 50 INJECTION, SOLUTION INTRAMUSCULAR; INTRAVENOUS at 11:10

## 2025-05-15 RX ADMIN — CEFAZOLIN 2 G: 1 INJECTION, POWDER, FOR SOLUTION INTRAMUSCULAR; INTRAVENOUS at 07:54

## 2025-05-15 RX ADMIN — LABETALOL HYDROCHLORIDE 10 MG: 5 INJECTION, SOLUTION INTRAVENOUS at 21:18

## 2025-05-15 RX ADMIN — SUGAMMADEX 150 MG: 100 INJECTION, SOLUTION INTRAVENOUS at 09:42

## 2025-05-15 RX ADMIN — LIDOCAINE HYDROCHLORIDE 40 MG: 20 INJECTION, SOLUTION INFILTRATION; PERINEURAL at 07:39

## 2025-05-15 RX ADMIN — SODIUM CHLORIDE, POTASSIUM CHLORIDE, SODIUM LACTATE AND CALCIUM CHLORIDE: 600; 310; 30; 20 INJECTION, SOLUTION INTRAVENOUS at 07:39

## 2025-05-15 RX ADMIN — LIDOCAINE HYDROCHLORIDE 40 MG: 20 INJECTION, SOLUTION INFILTRATION; PERINEURAL at 09:42

## 2025-05-15 RX ADMIN — FENTANYL CITRATE 25 MCG: 50 INJECTION, SOLUTION INTRAMUSCULAR; INTRAVENOUS at 11:24

## 2025-05-15 RX ADMIN — OXYCODONE HYDROCHLORIDE 10 MG: 10 TABLET ORAL at 16:12

## 2025-05-15 RX ADMIN — FENTANYL CITRATE 100 MCG: 50 INJECTION, SOLUTION INTRAMUSCULAR; INTRAVENOUS at 07:39

## 2025-05-15 RX ADMIN — HYDROMORPHONE HYDROCHLORIDE 0.2 MG: 0.5 INJECTION, SOLUTION INTRAMUSCULAR; INTRAVENOUS; SUBCUTANEOUS at 13:54

## 2025-05-15 RX ADMIN — FENTANYL CITRATE 25 MCG: 50 INJECTION, SOLUTION INTRAMUSCULAR; INTRAVENOUS at 11:48

## 2025-05-15 RX ADMIN — NITROGLYCERIN 100 MCG: 10 INJECTION INTRAVENOUS at 10:25

## 2025-05-15 RX ADMIN — ESMOLOL HYDROCHLORIDE 30 MG: 100 INJECTION, SOLUTION INTRAVENOUS at 10:25

## 2025-05-15 RX ADMIN — ROCURONIUM BROMIDE 40 MG: 10 INJECTION, SOLUTION INTRAVENOUS at 07:39

## 2025-05-15 RX ADMIN — ESMOLOL HYDROCHLORIDE 30 MG: 100 INJECTION, SOLUTION INTRAVENOUS at 08:52

## 2025-05-15 RX ADMIN — PROPOFOL 20 MG: 10 INJECTION, EMULSION INTRAVENOUS at 09:04

## 2025-05-15 SDOH — HEALTH STABILITY: MENTAL HEALTH: CURRENT SMOKER: 0

## 2025-05-15 ASSESSMENT — PAIN - FUNCTIONAL ASSESSMENT
PAIN_FUNCTIONAL_ASSESSMENT: 0-10
PAIN_FUNCTIONAL_ASSESSMENT: UNABLE TO SELF-REPORT
PAIN_FUNCTIONAL_ASSESSMENT: 0-10

## 2025-05-15 ASSESSMENT — PAIN SCALES - GENERAL
PAINLEVEL_OUTOF10: 5 - MODERATE PAIN
PAINLEVEL_OUTOF10: 5 - MODERATE PAIN
PAINLEVEL_OUTOF10: 7
PAINLEVEL_OUTOF10: 6
PAIN_LEVEL: 4
PAINLEVEL_OUTOF10: 10 - WORST POSSIBLE PAIN
PAINLEVEL_OUTOF10: 7
PAINLEVEL_OUTOF10: 5 - MODERATE PAIN
PAIN_LEVEL: 2
PAINLEVEL_OUTOF10: 5 - MODERATE PAIN

## 2025-05-15 ASSESSMENT — COLUMBIA-SUICIDE SEVERITY RATING SCALE - C-SSRS
1. IN THE PAST MONTH, HAVE YOU WISHED YOU WERE DEAD OR WISHED YOU COULD GO TO SLEEP AND NOT WAKE UP?: NO
6. HAVE YOU EVER DONE ANYTHING, STARTED TO DO ANYTHING, OR PREPARED TO DO ANYTHING TO END YOUR LIFE?: NO
2. HAVE YOU ACTUALLY HAD ANY THOUGHTS OF KILLING YOURSELF?: NO

## 2025-05-15 ASSESSMENT — PAIN DESCRIPTION - DESCRIPTORS: DESCRIPTORS: ACHING

## 2025-05-15 NOTE — BRIEF OP NOTE
Date: 5/15/2025  OR Location: [facility] OR    Name: Nahid Gastelum, : 1950, Age: 75 y.o., MRN: [id number], Sex: male    Diagnosis  Pre-op Diagnosis      * NPH (normal pressure hydrocephalus) (Multi) [G91.2] Post-op Diagnosis     * NPH (normal pressure hydrocephalus) (Multi) [G91.2]     Procedures  INSERTION, SHUNT, VENTRICULOPERITONEAL  21303 - UT CRTJ SHUNT [id number] TERMINUS      Surgeons      * Junito Melton - Primary    Resident/Fellow/Other Assistant:  Surgeons and Role:     * Dominguez Nagel MD - Resident - Assisting    Staff:   Scrub Person: Kaitlin  Circulator: Quang Helmsub Person: Garrett    Anesthesia Staff: Anesthesiologist: Prieto Keenan MD  C-AA: LUANN Augustine  Anesthesia Resident: Sarahi New MD    Procedure Summary  Anesthesia: General  ASA: III  Estimated Blood Loss: 10mL  Intra-op Medications:   Administrations occurring from 0700 to 1005 on 05/15/25:   Medication Name Total Dose   lidocaine-epinephrine (Xylocaine W/EPI) injection 5 mL   sodium chloride 0.9 % irrigation solution 1,000 mL   acetaminophen (Ofirmev) injection 1,000 mg   ceFAZolin (Ancef) vial 1 g 2 g   dexAMETHasone (Decadron) 10 mg/mL 4 mg   esmolol 10 mg/mL 120 mg   fentaNYL (Sublimaze) injection 50 mcg/mL 100 mcg   LR infusion Cannot be calculated   lidocaine (Xylocaine) injection 2 % 80 mg   ondansetron 2 mg/mL 4 mg   propofol (Diprivan) injection 10 mg/mL 170 mg   rocuronium (ZeMuron) 50 mg/5 mL injection 40 mg   sugammadex (Bridion) 200 mg/2 mL injection 150 mg              Anesthesia Record               Intraprocedure I/O Totals          Intake    LR infusion 400.00 mL    Total Intake 400 mL          Specimen: No specimens collected       Findings: Right occipital ventriculoperitoneal shunt (certas at 5)    Complications:  None; patient tolerated the procedure well.     Disposition: PACU - hemodynamically stable.  Condition: stable  Specimens Collected: No specimens collected  Attending  Attestation:     Junito Melton  Phone Number: [phone]

## 2025-05-15 NOTE — ANESTHESIA POSTPROCEDURE EVALUATION
Patient: Nahid Gastelum    Procedure Summary       Date: 05/15/25 Room / Location: [facility] OR 23 / [facility]    Anesthesia Start: 0725 Anesthesia Stop: 1044    Procedure: INSERTION, SHUNT, VENTRICULOPERITONEAL (Right) Diagnosis:       NPH (normal pressure hydrocephalus) (Multi)      (NPH (normal pressure hydrocephalus) (Multi) [G91.2])    Surgeons: Junito Melton MD Responsible Provider: Prieto Keenan MD    Anesthesia Type: general ASA Status: 3            Anesthesia Type: general    Vitals Value Taken Time   /81 05/15/25 10:46   Temp 36.2 05/15/25 10:57   Pulse 107 05/15/25 10:53   Resp 9 05/15/25 10:53   SpO2 100 % 05/15/25 10:53   Vitals shown include unfiled device data.    Anesthesia Post Evaluation    Patient location during evaluation: PACU  Patient participation: complete - patient participated  Level of consciousness: awake and alert  Pain score: 4  Pain management: adequate  Multimodal analgesia pain management approach  Airway patency: patent  Cardiovascular status: acceptable, hemodynamically stable and tachycardic  Respiratory status: acceptable and face mask  Hydration status: acceptable  Postoperative Nausea and Vomiting: none        No notable events documented.

## 2025-05-15 NOTE — ANESTHESIA PROCEDURE NOTES
Airway  Date/Time: 5/15/2025 7:44 AM  Reason: elective    Airway not difficult    Staffing  Performed: resident   Authorized by: Prieto Keenan MD    Performed by: Sarahi New MD  Patient location during procedure: OR    Patient Condition  Indications for airway management: anesthesia  Patient position: sniffing  Planned trial extubation  Sedation level: deep     Final Airway Details   Preoxygenated: yes  Final airway type: endotracheal airway  Successful airway: ETT  Cuffed: yes   Successful intubation technique: video laryngoscopy  Adjuncts used in placement: intubating stylet  Endotracheal tube insertion site: oral  Blade: [place]  Blade size: #4  ETT size (mm): 7.0  Cormack-Lehane Classification: grade IIa - partial view of glottis  Placement verified by: chest auscultation and capnometry   Measured from: lips  ETT to lips (cm): 22  Number of attempts at approach: 1

## 2025-05-15 NOTE — ANESTHESIA PREPROCEDURE EVALUATION
Patient: Nahid Gastelum    Procedure Information       Date/Time: 05/15/25 0700    Procedure: INSERTION, SHUNT, VENTRICULOPERITONEAL (Right)    Location: [facility] OR 23 / [facility] OR    Surgeons: Junito Melton MD            Relevant Problems   Anesthesia (within normal limits)      Cardiac   (+) Atrial fibrillation (Multi)   (+) CHF (congestive heart failure)   (+) HTN (hypertension)      Pulmonary (within normal limits)      Neuro   (+) Dementia      GI   (+) Gastroesophageal reflux disease      Endocrine   (+) Diabetes mellitus, type 2 (Multi)       Clinical information reviewed:   Tobacco  Allergies  Meds   Med Hx  Surg Hx   Fam Hx  Soc Hx        NPO Detail:  NPO/Void Status  Carbohydrate Drink Given Prior to Surgery? : N  Date of Last Liquid: 25  Time of Last Liquid:   Date of Last Solid: 25  Time of Last Solid:   Last Intake Type: Clear fluids  Time of Last Void: 600         Physical Exam    Airway  Mallampati: II  TM distance: >3 FB  Neck ROM: limited  Mouth openin finger widths     Cardiovascular - normal exam  Rhythm: irregular     Dental        Pulmonary - normal exam   Abdominal - normal exam           Anesthesia Plan    History of general anesthesia?: yes  History of complications of general anesthesia?: no    ASA 3     general     The patient is not a current smoker.    intravenous induction   Postoperative pain plan includes opioids.  Trial extubation is planned.  Anesthetic plan and risks discussed with patient.  Use of blood products discussed with patient who consented to blood products.    Plan discussed with attending.

## 2025-05-15 NOTE — ANESTHESIA POSTPROCEDURE EVALUATION
Patient: Nahid Gastelum    Procedure Summary       Date: 05/15/25 Room / Location: [facility] OR 23 / [facility]    Anesthesia Start: 0725 Anesthesia Stop: 1044    Procedure: INSERTION, SHUNT, VENTRICULOPERITONEAL (Right) Diagnosis:       NPH (normal pressure hydrocephalus) (Multi)      (NPH (normal pressure hydrocephalus) (Multi) [G91.2])    Surgeons: Junito Melton MD Responsible Provider: Prieto Keenan MD    Anesthesia Type: general ASA Status: 3            Anesthesia Type: general    Vitals Value Taken Time   /81 05/15/25 10:46   Temp 36 05/15/25 10:57   Pulse 107 05/15/25 10:53   Resp 9 05/15/25 10:53   SpO2 100 % 05/15/25 10:53   Vitals shown include unfiled device data.    Anesthesia Post Evaluation    Patient location during evaluation: PACU  Patient participation: complete - patient participated  Level of consciousness: awake  Pain score: 2  Pain management: adequate  Airway patency: patent  Cardiovascular status: stable  Respiratory status: face mask  Hydration status: acceptable  Postoperative Nausea and Vomiting: none        No notable events documented.

## 2025-05-16 ENCOUNTER — APPOINTMENT (OUTPATIENT)
Dept: RADIOLOGY | Facility: HOSPITAL | Age: 75
DRG: 033 | End: 2025-05-16
Payer: MEDICARE

## 2025-05-16 LAB
ALBUMIN SERPL BCP-MCNC: 3.2 G/DL (ref 3.4–5)
ANION GAP SERPL CALC-SCNC: 13 MMOL/L (ref 10–20)
BUN SERPL-MCNC: 13 MG/DL (ref 6–23)
CALCIUM SERPL-MCNC: 8.9 MG/DL (ref 8.6–10.6)
CHLORIDE SERPL-SCNC: 105 MMOL/L (ref 98–107)
CO2 SERPL-SCNC: 27 MMOL/L (ref 21–32)
CREAT SERPL-MCNC: 0.69 MG/DL (ref 0.5–1.3)
EGFRCR SERPLBLD CKD-EPI 2021: >90 ML/MIN/1.73M*2
ERYTHROCYTE [DISTWIDTH] IN BLOOD BY AUTOMATED COUNT: 13.1 % (ref 11.5–14.5)
GLUCOSE BLD MANUAL STRIP-MCNC: 138 MG/DL (ref 74–99)
GLUCOSE BLD MANUAL STRIP-MCNC: 174 MG/DL (ref 74–99)
GLUCOSE BLD MANUAL STRIP-MCNC: 193 MG/DL (ref 74–99)
GLUCOSE SERPL-MCNC: 141 MG/DL (ref 74–99)
HCT VFR BLD AUTO: 38.4 % (ref 41–52)
HGB BLD-MCNC: 12.2 G/DL (ref 13.5–17.5)
MCH RBC QN AUTO: 31.4 PG (ref 26–34)
MCHC RBC AUTO-ENTMCNC: 31.8 G/DL (ref 32–36)
MCV RBC AUTO: 99 FL (ref 80–100)
NRBC BLD-RTO: 0 /100 WBCS (ref 0–0)
PHOSPHATE SERPL-MCNC: 4 MG/DL (ref 2.5–4.9)
PLATELET # BLD AUTO: 177 X10*3/UL (ref 150–450)
POTASSIUM SERPL-SCNC: 3.9 MMOL/L (ref 3.5–5.3)
RBC # BLD AUTO: 3.89 X10*6/UL (ref 4.5–5.9)
SODIUM SERPL-SCNC: 141 MMOL/L (ref 136–145)
WBC # BLD AUTO: 11.3 X10*3/UL (ref 4.4–11.3)

## 2025-05-16 PROCEDURE — 70450 CT HEAD/BRAIN W/O DYE: CPT | Performed by: RADIOLOGY

## 2025-05-16 PROCEDURE — 97165 OT EVAL LOW COMPLEX 30 MIN: CPT | Mod: GO

## 2025-05-16 PROCEDURE — 82947 ASSAY GLUCOSE BLOOD QUANT: CPT

## 2025-05-16 PROCEDURE — 36415 COLL VENOUS BLD VENIPUNCTURE: CPT

## 2025-05-16 PROCEDURE — 97535 SELF CARE MNGMENT TRAINING: CPT | Mod: GO

## 2025-05-16 PROCEDURE — 70450 CT HEAD/BRAIN W/O DYE: CPT

## 2025-05-16 PROCEDURE — 2500000001 HC RX 250 WO HCPCS SELF ADMINISTERED DRUGS (ALT 637 FOR MEDICARE OP)

## 2025-05-16 PROCEDURE — 85027 COMPLETE CBC AUTOMATED: CPT

## 2025-05-16 PROCEDURE — 99232 SBSQ HOSP IP/OBS MODERATE 35: CPT

## 2025-05-16 PROCEDURE — 1200000002 HC GENERAL ROOM WITH TELEMETRY DAILY

## 2025-05-16 PROCEDURE — 97161 PT EVAL LOW COMPLEX 20 MIN: CPT | Mod: GP

## 2025-05-16 PROCEDURE — 2500000002 HC RX 250 W HCPCS SELF ADMINISTERED DRUGS (ALT 637 FOR MEDICARE OP, ALT 636 FOR OP/ED)

## 2025-05-16 PROCEDURE — 97116 GAIT TRAINING THERAPY: CPT | Mod: GP

## 2025-05-16 PROCEDURE — 2500000004 HC RX 250 GENERAL PHARMACY W/ HCPCS (ALT 636 FOR OP/ED): Performed by: PHYSICIAN ASSISTANT

## 2025-05-16 PROCEDURE — 80069 RENAL FUNCTION PANEL: CPT

## 2025-05-16 PROCEDURE — 2500000004 HC RX 250 GENERAL PHARMACY W/ HCPCS (ALT 636 FOR OP/ED)

## 2025-05-16 RX ORDER — HYDRALAZINE HYDROCHLORIDE 20 MG/ML
5 INJECTION INTRAMUSCULAR; INTRAVENOUS EVERY 8 HOURS PRN
Status: DISCONTINUED | OUTPATIENT
Start: 2025-05-16 | End: 2025-05-17

## 2025-05-16 RX ORDER — LABETALOL HYDROCHLORIDE 5 MG/ML
10 INJECTION, SOLUTION INTRAVENOUS EVERY 6 HOURS PRN
Status: DISCONTINUED | OUTPATIENT
Start: 2025-05-16 | End: 2025-05-17

## 2025-05-16 RX ADMIN — ACETAMINOPHEN 650 MG: 325 TABLET, FILM COATED ORAL at 22:58

## 2025-05-16 RX ADMIN — INSULIN LISPRO 2 UNITS: 100 INJECTION, SOLUTION INTRAVENOUS; SUBCUTANEOUS at 15:57

## 2025-05-16 RX ADMIN — FERROUS SULFATE TAB 325 MG (65 MG ELEMENTAL FE) 1 TABLET: 325 (65 FE) TAB at 08:49

## 2025-05-16 RX ADMIN — LABETALOL HYDROCHLORIDE 10 MG: 5 INJECTION, SOLUTION INTRAVENOUS at 08:46

## 2025-05-16 RX ADMIN — SENNOSIDES AND DOCUSATE SODIUM 2 TABLET: 50; 8.6 TABLET ORAL at 20:36

## 2025-05-16 RX ADMIN — Medication 2 TABLET: at 08:46

## 2025-05-16 RX ADMIN — MAGNESIUM OXIDE TAB 400 MG (241.3 MG ELEMENTAL MG) 1 TABLET: 400 (241.3 MG) TAB at 08:46

## 2025-05-16 RX ADMIN — ATORVASTATIN CALCIUM 80 MG: 80 TABLET, FILM COATED ORAL at 08:48

## 2025-05-16 RX ADMIN — FLUTICASONE PROPIONATE 1 SPRAY: 50 SPRAY, METERED NASAL at 08:46

## 2025-05-16 RX ADMIN — POLYETHYLENE GLYCOL 3350 17 G: 17 POWDER, FOR SOLUTION ORAL at 08:46

## 2025-05-16 RX ADMIN — POLYETHYLENE GLYCOL 3350 17 G: 17 POWDER, FOR SOLUTION ORAL at 20:36

## 2025-05-16 RX ADMIN — METOPROLOL TARTRATE 12.5 MG: 25 TABLET, FILM COATED ORAL at 08:49

## 2025-05-16 RX ADMIN — LABETALOL HYDROCHLORIDE 10 MG: 5 INJECTION, SOLUTION INTRAVENOUS at 04:18

## 2025-05-16 RX ADMIN — ACETAMINOPHEN 650 MG: 325 TABLET, FILM COATED ORAL at 11:13

## 2025-05-16 RX ADMIN — OXYCODONE HYDROCHLORIDE AND ACETAMINOPHEN 250 MG: 500 TABLET ORAL at 08:49

## 2025-05-16 RX ADMIN — DONEPEZIL HYDROCHLORIDE 10 MG: 10 TABLET, FILM COATED ORAL at 20:36

## 2025-05-16 RX ADMIN — ACETAMINOPHEN 650 MG: 325 TABLET, FILM COATED ORAL at 04:20

## 2025-05-16 RX ADMIN — ACETAMINOPHEN 650 MG: 325 TABLET, FILM COATED ORAL at 17:13

## 2025-05-16 RX ADMIN — INSULIN LISPRO 2 UNITS: 100 INJECTION, SOLUTION INTRAVENOUS; SUBCUTANEOUS at 11:34

## 2025-05-16 RX ADMIN — OXYCODONE HYDROCHLORIDE 10 MG: 10 TABLET ORAL at 08:46

## 2025-05-16 RX ADMIN — OXYCODONE HYDROCHLORIDE 10 MG: 10 TABLET ORAL at 00:29

## 2025-05-16 RX ADMIN — METOPROLOL TARTRATE 12.5 MG: 25 TABLET, FILM COATED ORAL at 20:36

## 2025-05-16 ASSESSMENT — PAIN DESCRIPTION - LOCATION
LOCATION: HEAD

## 2025-05-16 ASSESSMENT — COGNITIVE AND FUNCTIONAL STATUS - GENERAL
TOILETING: A LOT
DRESSING REGULAR LOWER BODY CLOTHING: A LITTLE
DRESSING REGULAR UPPER BODY CLOTHING: A LOT
CLIMB 3 TO 5 STEPS WITH RAILING: A LOT
EATING MEALS: A LITTLE
TURNING FROM BACK TO SIDE WHILE IN FLAT BAD: A LITTLE
HELP NEEDED FOR BATHING: A LOT
DAILY ACTIVITIY SCORE: 13
EATING MEALS: A LITTLE
STANDING UP FROM CHAIR USING ARMS: A LITTLE
STANDING UP FROM CHAIR USING ARMS: A LOT
DRESSING REGULAR UPPER BODY CLOTHING: A LITTLE
WALKING IN HOSPITAL ROOM: A LOT
MOVING TO AND FROM BED TO CHAIR: A LOT
HELP NEEDED FOR BATHING: A LITTLE
TURNING FROM BACK TO SIDE WHILE IN FLAT BAD: A LOT
TOILETING: A LITTLE
DAILY ACTIVITIY SCORE: 18
MOVING TO AND FROM BED TO CHAIR: A LITTLE
WALKING IN HOSPITAL ROOM: A LOT
MOBILITY SCORE: 11
PERSONAL GROOMING: A LITTLE
PERSONAL GROOMING: A LOT
MOVING FROM LYING ON BACK TO SITTING ON SIDE OF FLAT BED WITH BEDRAILS: A LOT
MOVING FROM LYING ON BACK TO SITTING ON SIDE OF FLAT BED WITH BEDRAILS: A LITTLE
DRESSING REGULAR LOWER BODY CLOTHING: A LOT
CLIMB 3 TO 5 STEPS WITH RAILING: TOTAL
MOBILITY SCORE: 16

## 2025-05-16 ASSESSMENT — PAIN DESCRIPTION - DESCRIPTORS
DESCRIPTORS: ACHING

## 2025-05-16 ASSESSMENT — PAIN - FUNCTIONAL ASSESSMENT
PAIN_FUNCTIONAL_ASSESSMENT: 0-10

## 2025-05-16 ASSESSMENT — PAIN SCALES - GENERAL
PAINLEVEL_OUTOF10: 7
PAINLEVEL_OUTOF10: 3
PAINLEVEL_OUTOF10: 4
PAINLEVEL_OUTOF10: 4
PAINLEVEL_OUTOF10: 5 - MODERATE PAIN
PAINLEVEL_OUTOF10: 4
PAINLEVEL_OUTOF10: 5 - MODERATE PAIN

## 2025-05-16 ASSESSMENT — ACTIVITIES OF DAILY LIVING (ADL)
BATHING_WHERE_ASSESSED: EDGE OF BED
BATHING_ASSISTANCE: MAXIMAL
ADL_ASSISTANCE: NEEDS ASSISTANCE
LACK_OF_TRANSPORTATION: NO
HOME_MANAGEMENT_TIME_ENTRY: 15
BATHING_LEVEL_OF_ASSISTANCE: MAXIMUM ASSISTANCE

## 2025-05-16 NOTE — PROGRESS NOTES
Physical Therapy    Physical Therapy Evaluation & Treatment    Patient Name: Nahid Gastelum  MRN: [id number]  Department: [place] [address]  Room: [address] [place] [address]  Today's Date: 5/16/2025   Time Calculation  Start Time: 1145  Stop Time: 1216  Time Calculation (min): 31 min    Assessment/Plan   PT Assessment  PT Assessment Results: Decreased strength, Impaired balance, Decreased mobility, Decreased coordination  Rehab Prognosis: Good  Barriers to Discharge Home: Caregiver assistance, Physical needs  Caregiver Assistance: Caregiver assistance needed per identified barriers - however, level of patient's required assistance exceeds assistance available at home  Physical Needs: Ambulating household distances limited by function/safety, 24hr mobility assistance needed, High falls risk due to function or environment  Evaluation/Treatment Tolerance:  (Patient limited by dizziness)  Medical Staff Made Aware: Yes (RN and NP notified)  Strengths: Housing layout  End of Session Communication: Bedside nurse (NP)  Assessment Comment: 74 yo male. Typ Ind with FWW.  Pt s/p VPS for NPH.  Currently, limited by deficits in strength, balance and coordination requiring Mod A to ambulate very short distances. Would benefit from cont PT while in hosp to address deficits and facilitate return to PLOF.  End of Session Patient Position: Bed, 3 rail up, Alarm on   IP OR SWING BED PT PLAN  Inpatient or Swing Bed: Inpatient  PT Plan  Treatment/Interventions: Bed mobility, Transfer training, Gait training, Balance training, Therapeutic exercise, Therapeutic activity  PT Plan: Ongoing PT  PT Frequency: 5 times per week  PT Discharge Recommendations: Moderate intensity level of continued care  PT Recommended Transfer Status: Assist x1, Assistive device (to BSC, chair, x2 to bathroom)  PT - OK to Discharge: Yes    Subjective     PT Visit Info:  PT Received On: 05/16/25  General Visit Information:  General  Reason for Referral: NPH s/p VPS  Past Medical History  "Relevant to Rehab: CHF, Afib, anxiety, DM, BPH, dementia  Family/Caregiver Present: Yes  Caregiver Feedback: Wife present and supportive  Prior to Session Communication: Bedside nurse  Patient Position Received: Bed, 3 rail up, Alarm on  General Comment: Supine.  Pt with eyes closed, but responds to voice.  Able to open eyes, but will then close them.  Pt able to sit EOB and stand several times.  Took steps, but reporting dizzy.  Tried again after seated break, but dizzy again.  BP did not change substantially.  Pt returned to supine.  Reporting improvement, but  dizziness not resolved in supine.  Alerted RN and NP.  Home Living:  Home Living  Type of Home: Apartment  Lives With: Spouse  Home Adaptive Equipment: Walker rolling or standard  Home Layout: One level  Home Access: Level entry  Bathroom Shower/Tub: Tub/shower unit  Prior Level of Function:  Prior Function Per Pt/Caregiver Report  Level of [place]: Independent with ADLs and functional transfers (Ind at baseline, but requiring assist for past 3 months)  Receives Help From:  (HHA 2x/wk)  Ambulatory Assistance: Independent (w/ FWW)  Precautions:  Precautions  Medical Precautions: Fall precautions     Date/Time Vitals Session Patient Position Pulse Resp SpO2 BP MAP (mmHg)    05/16/25 1128 --  --  --  14  100 %  118/70  86     05/16/25 1145 Pre PT  Lying  74  --  --  110/68  --     05/16/25 1200 During PT  Sitting  79  --  --  104/52  --     05/16/25 1204 During PT  Standing  82  --  --  102/64  --              Objective   Pain:  Pain Assessment  Pain Assessment:  (\"just a little\", pt had been premedicated)  Pain Location: Head  Pain Interventions: Ambulation/increased activity, Repositioned  Response to Interventions:  (Tolerated PT)  Cognition:  Cognition  Overall Cognitive Status: Impaired  Arousal/Alertness: Delayed responses to stimuli  Orientation Level: Disoriented to time, Disoriented to place (NP present and aware)  Following Commands: Follows one " step commands with repetition (& increased time)  Processing Speed: Delayed    General Assessments:    Activity Tolerance  Endurance: Tolerates 10 - 20 min exercise with multiple rests    Sensation  Light Touch: No apparent deficits    Strength  Strength Comments: BLE  hip 2+/5, knee 4/5, ankle 3+/5  Strength  Strength Comments: BLE  hip 2+/5, knee 4/5, ankle 3+/5    Coordination  Movements are Fluid and Coordinated: No (Delayed, & tremors)    Postural Control  Postural Control:  (kyphotic with forward head)    Static Sitting Balance  Static Sitting-Level of Assistance: Close supervision  Dynamic Sitting Balance  Dynamic Sitting-Level of Assistance: Contact guard    Static Standing Balance  Static Standing-Level of Assistance: Contact guard (with FWW)  Dynamic Standing Balance  Dynamic Standing-Level of Assistance: Moderate assistance (With FWW for limited side and forward steps)    Extremity/Trunk Assessments:    RLE   RLE :  (ROM WFL)  LLE   LLE :  (ROM WFL)  Treatments:    Bed Mobility 1  Bed Mobility 1: Supine to sitting, Sitting to supine  Level of Assistance 1: Maximum assistance, Moderate verbal cues, Moderate tactile cues    Ambulation/Gait Training  Ambulation/Gait Training Performed: Yes  Ambulation/Gait Training 1  Surface 1: Level tile  Device 1: Rolling walker  Assistance 1: Moderate assistance, Moderate verbal cues, Moderate tactile cues  Quality of Gait 1: Narrow base of support, Diminished heel strike, Inconsistent stride length, Decreased step length, Shuffling gait, Forward flexed posture  Comments/Distance (ft) 1: 2 feet sidesteps, seated break, 2 feet forward, backward  Transfer 1  Transfer From 1: Sit to, Stand to  Transfer to 1: Sit  Transfer Device 1: Walker  Transfer Level of Assistance 1: Moderate assistance, Moderate verbal cues, Moderate tactile cues    Outcome Measures:    [facility] Basic Mobility  Turning from your back to your side while in a flat bed without using bedrails: A lot  Moving  from lying on your back to sitting on the side of a flat bed without using bedrails: A lot  Moving to and from bed to chair (including a wheelchair): A lot  Standing up from a chair using your arms (e.g. wheelchair or bedside chair): A lot  To walk in hospital room: A lot  Climbing 3-5 steps with railing: Total  Basic Mobility - Total Score: 11    Encounter Problems       Encounter Problems (Active)       Balance       Sitting EOB 20 minutes, distant  supervision for static sitting, close supervision for dynamic sitting activities  (Progressing)       Start:  05/16/25    Expected End:  05/30/25            Standing >5 minutes, FWW, SBA (Progressing)       Start:  05/16/25    Expected End:  05/30/25               Mobility       Ambulate >50 feet with FWW and Min A  (Progressing)       Start:  05/16/25    Expected End:  05/30/25               PT Transfers       Pt able to perform bed mobility CGA from a flat bed without rail.  (Progressing)       Start:  05/16/25    Expected End:  05/30/25            sit<>stand, bed<>chair, FWW, CGA (Progressing)       Start:  05/16/25    Expected End:  05/30/25               Pain - Adult              Education Documentation  Mobility Training, taught by Pardeep Sanderson, PT at 5/16/2025 12:47 PM.  Learner: Family, Patient  Readiness: Acceptance  Method: Explanation  Response: Verbalizes Understanding  Comment: Role of PT, POC, progression of mobility    Education Comments  No comments found.

## 2025-05-16 NOTE — HOSPITAL COURSE
Nahid Gastelum is a 75 y.o. male with a past medical history of HTN, HLD, Afib, MVR (on Warfarin), TIA, DM, BCC, urinary frequency/BPH, dementia, NPH. He was seen in clinic and for VPS planning.   5/15 s/p R occipital VPS (Certas at 5), CTH cath in position. He was then admitted to neurosurgery service post operatively.   5/16 noted to have change in mental status, CTH ***    PT/OT evaluated patient and recommended SNF at time of discharge     On the day of discharge, the patient was seen and evaluated by the neurosurgery team and deemed suitable for discharge. The patient was given detailed discharge instructions and were scheduled to follow up as an outpatient.

## 2025-05-16 NOTE — OP NOTE
INSERTION, SHUNT, VENTRICULOPERITONEAL (R) Operative Note     Date: 5/15/2025  OR Location: [facility] OR    Name: Nahid Gastelum, : 1950, Age: 75 y.o., MRN: [id number], Sex: male    Diagnosis  Pre-op Diagnosis      * NPH (normal pressure hydrocephalus) (Multi) [G91.2] Post-op Diagnosis     * NPH (normal pressure hydrocephalus) (Multi) [G91.2]     Procedures  INSERTION, SHUNT, VENTRICULOPERITONEAL  43911 - GA CRTJ SHUNT [id number] TERMINUS      Surgeons      * Junito Melton - Primary    Resident/Fellow/Other Assistant:  Surgeons and Role:     * Dominguez Nagel MD - Resident - Assisting    Staff:   Scrub Person: Kaitlin  Circulator: Quang  Scrub Person: Garrett    Anesthesia Staff: Anesthesiologist: Prieto Keenan MD  C-AA: LUANN Augustine  Anesthesia Resident: Sarahi New MD    Procedure Summary  Anesthesia: General  ASA: III  Estimated Blood Loss: 10mL  Intra-op Medications:   Administrations occurring from 0700 to 1005 on 05/15/25:   Medication Name Total Dose   lidocaine-epinephrine (Xylocaine W/EPI) injection 5 mL   sodium chloride 0.9 % irrigation solution 1,000 mL   acetaminophen (Ofirmev) injection 1,000 mg   ceFAZolin (Ancef) vial 1 g 2 g   dexAMETHasone (Decadron) 10 mg/mL 4 mg   esmolol 10 mg/mL 120 mg   fentaNYL (Sublimaze) injection 50 mcg/mL 100 mcg   LR infusion Cannot be calculated   lidocaine (Xylocaine) injection 2 % 80 mg   ondansetron 2 mg/mL 4 mg   propofol (Diprivan) injection 10 mg/mL 170 mg   rocuronium (ZeMuron) 50 mg/5 mL injection 40 mg   sugammadex (Bridion) 200 mg/2 mL injection 150 mg              Anesthesia Record               Intraprocedure I/O Totals          Intake    LR infusion 400.00 mL    Total Intake 400 mL          Specimen: No specimens collected     Drains and/or Catheters:   External Urinary Catheter Male (Active)   External Catheter Status In place 25 0800   Output (mL) 650 mL 25 0055       Tourniquet Times:  N/A    Implants:  Implants       Type Name Action Serial No.      Neuro Interventional Implant VALVE, CERTAS PLUS, PROGRAMMABLE - [id number] - [id number] Implanted [id number]     Neuro Interventional Implant KIT, BACTISEAL W/VENTRICULAR & DISTAL (PERITONEAL) CATHETERS - [id number] - [id number] Implanted [id number]     Neuro Interventional Implant RESERVOIR, RAJIV, LARGE - [id number] - [id number] Implanted [id number]              Findings: placement of  shunt (Certas at 5)    Indications: Nahid Gastelum is an 75 y.o. male who is having surgery for NPH (normal pressure hydrocephalus) (Multi) [G91.2].     The patient was seen in the preoperative area. The risks, benefits, complications, treatment options, non-operative alternatives, expected recovery and outcomes were discussed with the patient. The possibilities of reaction to medication, pulmonary aspiration, injury to surrounding structures, bleeding, recurrent infection, the need for additional procedures, failure to diagnose a condition, and creating a complication requiring transfusion or operation were discussed with the patient. The patient concurred with the proposed plan, giving informed consent.  The site of surgery was properly noted/marked if necessary per policy. The patient has been actively warmed in preoperative area. Preoperative antibiotics have been ordered and given within 0.5 hours of incision. Venous thrombosis prophylaxis have been ordered including bilateral sequential compression devices    Procedure Details:   The patient was brought back from preop to OR. A safety huddle was performed and anesthesia was induced. The right side of the head and abdomen were cleansed, prepped and draped in usual sterile fashion. Local anesthetic was used to infiltrate skin over incision.     A C-shaped incision was placed over Solis's point on the right side of the head. Trajectory was confirmed using navigation. Incision was made with a 10 blade and hemostasis was  achieved. A burrhole was made with acorn; dura was coagulated with bipolar electrocautery.  A pocket was made under the scalp for the shunt valve. A cruciate incision was made in dura, and dural leaflets and eyal were coagulated. Ventricular catheter was passed into ventricle with good return of CSF. Rickham reservoir was connected to proximal catheter.    Then, abdominal access was made with laparoscopic trochar; abdomen was insufflated and intraperitoneal placement was confirmed with laparoscopy. A second abdominal site was accessed with a sharp beveled needle; Guide wire and peel-away sheath was placed in second incision with intraperitoneal placement again confirmed.    Shunt tunneler was passed from cranial incision to abdominal incision; trochar was removed and tubing was passed through the retained tunneling sheath. This tubing was brought to C-shaped scalp incision. Certas valve was attached to this A-tubing and sheath was removed. Rickham was attached to valve. Attachment sites were secured with silk ties; flow into distal tubing was confirmed by pumping shunt reservoir.    Distal A-tubing was then passed through peel-away sheath and sheath was removed after tubing was confirmed to be intra-abdominal on laparoscopy.    Scalp and postauricular incisions were closed with 2-0 vicryls followed by running 4-0 monocryl. Abdominal incisions closed with inverted 2-0 vicryls and dermabond. All counts were correct at end of case and patient was turned over to anesthesia for extubation.    Evidence of Infection: No   Complications:  None; patient tolerated the procedure well.    Disposition: PACU - hemodynamically stable.  Condition: stable     Additional Details: N/A    Attending Attestation:     Junito Melton  Phone Number: [phone]

## 2025-05-16 NOTE — PROGRESS NOTES
"  Department of Neurosurgery  Daily Progress Note    Patient Name: Nahid Gastelum  MRN: [id number]  Date:  05/16/25     Subjective  Patient seen this morning with wife at bedside. States he is tired this morning and has minimal post op pain.  Denies any fever, chills, night sweats, CP, SOB, headache, extremity numbness/weakness, vision changes, palpitations, nausea, vomiting, or abdominal pain/discomfort.    Overnight Events  No acute events overnight    Procedures  5/15 R occipital VPS (Certas at 5)    Objective    Vital Signs  /86 Comment: after PRN dose of labetalol.  Pulse 96   Temp 36.6 °C (97.9 °F)   Resp 16   Ht 1.702 m (5' 7\")   Wt 65.6 kg (144 lb 10 oz)   SpO2 99%   BMI 22.65 kg/m²      Physical Exam  Constitutional: A&Ox3, calm and cooperative, NAD.  Eyes: PERRL, clear sclera .  ENMT: Moist mucous membranes, no apparent injuries or lesions.  Head/Neck: Neck supple, no JVD. NC/AT.   Cardiovascular: Normal rate and regular rhythm. 2+ equal pulses of the distal extremities.  Respiratory/Thorax: CTAB, regular respirations on RA. Good symmetric chest expansion.   Gastrointestinal: Abdomen soft, non tender.   Genitourinary: Voiding independently.   Extremities: No peripheral edema. Moving all 4 extremities actively.   Psychological: Appropriate mood and behavior.   Neurological:   A&Ox3  BUE 5/5  BLE 5/5  Skin: surgical incision C/D/I      Assessment/Plan  Nahid Gastelum is a 75 y.o. male with a past medical history of HTN, HLD, Afib, MVR (on Warfarin), TIA, DM, BCC, urinary frequency/BPH, dementia, NPH. He was seen in clinic and for VPS planning.   5/15 s/p R occipital VPS (Certas at 5), [facility] cath in position. He was then admitted to neurosurgery service post operatively.     # S/p R Occipital VPS (certas at 5)   - PT/OT eval post op  - Monitor Neuro checks Q4hrs  - Monitor VS/pulse ox Q4hrs   - Monitor AM CBC/RFP    # Acute postoperative pain  - Well controlled per patient, continue current " regimen  -Scheduled Tylenol 650mg PO [id number]   -Oxycodone 5/10mg PO [id number] PRN mod/severe pain   -IV Dilaudid 0.2mg Q4hr PRN for breakthrough pain  - Bowel regimen while using narcotics  - IV Zofran PRN nausea due to narcotics   - Pain assessments [id number]     # Hx of HTN  - Continue home Metoprolol 12.5mg BID  - PRN Hydralazine and Labetalol for SBP >160  - Monitor BP Q4hr and PRN    # Hx of HLD  - Continue home Atorvastatin 80mg daily    # Hx of Afib  # Hx of MVR  - Hold home Warfarin, resume POD5 (5/20)    # Hx of DM  - Hold home Zetia while inpatient  - SSI  - Monitor BG  - Carb controlled diet    # Hx of Dementia  - Continue home Aricept 10mg nightly    Prophylaxis:   - DVT: SQH Q8hr POD2, SCDs, encourage ambulation   - Encourage IS x10 every hour while awake     FEN/GI:  - Monitor/replete electrolytes PRN   - Continue carb controlled diet   - GI protection with Protonix 40mg daily   - Bowel regimen: Scheduled Miralax and pericolace daily        Disposition: Pending PTOT evaluation   -Follow up with NSGY 6/4 & 7/2    Patient and plan discussed with Dr. Melton and Chief resident Dr. Nancy Romero, [facility]  Neurosurgery  [place]  Team Pager #[phone]    Total face to face time spent with patient/family of > 45 minutes, with >50% of the time spent discussing plan of care/management, counseling/educating on disease processes, explaining results of diagnostic testing.

## 2025-05-16 NOTE — PROGRESS NOTES
Occupational Therapy    Evaluation and Treatment    Patient Name: Nahid Gastelum  MRN: [id number]  Today's Date: 5/16/2025  Room: [address] [place] [address]  Time Calculation  Start Time: 0947  Stop Time: 1017  Time Calculation (min): 30 min    Assessment  IP OT Assessment  OT Assessment: This 74 y/o male w/ NPH s/p shunt demos decreased activity tolerance, mobility, coordination, and balance resulting in increased need for assist w/ I/ADLs and the continued need for skilled OT services at MOD intensity to allow for a safe and functional d/c.  Prognosis: Fair  Barriers to Discharge Home: Caregiver assistance, Physical needs, Cognition needs  Caregiver Assistance: Caregiver assistance needed per identified barriers - however, level of patient's required assistance exceeds assistance available at home  Cognition Needs: 24hr supervision for safety awareness needed  Physical Needs: 24hr mobility assistance needed, 24hr ADL assistance needed, High falls risk due to function or environment  Evaluation/Treatment Tolerance: Patient tolerated treatment well  Medical Staff Made Aware: Yes  End of Session Communication: Bedside nurse  End of Session Patient Position: Bed, 3 rail up, Alarm on (Chair position)  Plan:  Inpatient Plan  Treatment Interventions: ADL retraining, Functional transfer training, Endurance training, Cognitive reorientation, Patient/family training, Neuromuscular reeducation, Fine motor coordination activities, Compensatory technique education  OT Discharge Recommendations: Moderate intensity level of continued care  Equipment Recommended upon Discharge:  (Tub bench)  OT Recommended Transfer Status: Assist of 1  OT - OK to Discharge: Yes  OT Assessment  OT Assessment Results: Decreased ADL status, Decreased endurance, Decreased cognition, Decreased fine motor control, Decreased functional mobility, Decreased gross motor control, Decreased IADLs  Prognosis: Fair  Barriers to Discharge: None  Evaluation/Treatment Tolerance:  Patient tolerated treatment well  Medical Staff Made Aware: Yes  Strengths: Premorbid level of function, Support of Caregivers  Barriers to Participation: Comorbidities    Subjective   Current Problem:  1. NPH (normal pressure hydrocephalus) (Multi)          General:  Reason for Referral: This 74 y/o M w/ NPH presents s/p scheduled insertion of R occipital ventriculoperitoneal shunt 5/15  Past Medical History Relevant to Rehab: CHF, Afib, anxiety, DM, BPH, dementia  Prior to Session Communication: Bedside nurse  Patient Position Received: Bed, 3 rail up, Alarm on  Family/Caregiver Present: Yes  Caregiver Feedback: Wife present and supportive  General Comment: Pt sup in bed on arrival w/ HOB elevated. Pt agreeabl to OT session and wife requesting placement in facility for continued rehab services prior to d/c home. Pt w/ eyes mostly closed during session and delayed processing and responsiveness.   Precautions:  Medical Precautions: Fall precautions  Vital Signs:    Pain:  Pain Assessment  Pain Assessment: 0-10  0-10 (Numeric) Pain Score: 5 - Moderate pain  Pain Type: Acute pain, Surgical pain  Pain Location: Head  Lines/Tubes/Drains:  External Urinary Catheter Male (Active)   Number of days: 0         Objective   Cognition:  Overall Cognitive Status: Impaired  Arousal/Alertness: Delayed responses to stimuli  Orientation Level: Oriented X4 (with choises)  Following Commands: Follows one step commands with increased time (and repitition)  Processing Speed: Delayed  Home Living:  Type of Home: Apartment (1st floor)  Lives With: Spouse  Home Adaptive Equipment: Walker rolling or standard  Home Layout: One level  Home Access: Level entry  Bathroom Shower/Tub: Tub/shower unit  Bathroom Equipment: Grab bars in shower, Shower chair with back   Prior Function:  Level of [place]: Needs assistance with ADLs, Needs assistance with homemaking, Needs assistance with functional transfers  Receives Help From: Family, Home  "health (RN 2d/wk does exercises and walks pt; HHA 2x/wk assists w/ showers)  ADL Assistance: Needs assistance (Assist w/ all aspects of ADLs; urinated in urnal w/o assist and required extended time for completion of ADLs)  Homemaking Assistance: Needs assistance  Ambulatory Assistance: Independent (w/ FWW)  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)  Leisure: \"chasing my wife\"  Prior Function Comments: Per wife pt was IND w/ ADLs 3 mo ago  ADL:  Eating Assistance: Minimal  Grooming Assistance: Minimal  Bathing Assistance: Maximal  UE Dressing Assistance: Maximal  LE Dressing Assistance: Maximal  Toileting Assistance with Device: Maximal  Activity Tolerance:  Endurance: Tolerates 10 - 20 min exercise with multiple rests  Balance:  Static Sitting Balance  Static Sitting-Level of Assistance: Close supervision  Static Standing Balance  Static Standing-Level of Assistance: Contact guard  Static Standing-Comment/Number of Minutes: initally Min A progressed to CGA  Bed Mobility/Transfers: Bed Mobility/Transfers: Bed Mobility  Bed Mobility: Yes  Bed Mobility 1  Bed Mobility 1: Supine to sitting, Sitting to supine  Level of Assistance 1: Maximum assistance  Bed Mobility Comments 1: Assist w/ BLE and trunk w/ pt demo'ing very stiff posture   and Transfers  Transfer: Yes  Transfer 1  Technique 1: Sit to stand  Transfer Device 1: Walker  Transfer Level of Assistance 1: Moderate assistance  Trials/Comments 1: Max verbal and tactile cues for sequencing and body mechanics. Difficulty noted w/ initiation  Vision: Vision - Basic Assessment  Current Vision: Wears glasses all the time  Sensation:  Sensation Comment: No apparent deficits  Coordination:  Movements are Fluid and Coordinated: No  Coordination Comment: Pt w/ very delayed and stiff body movements   Hand Function:  Hand Function  Gross Grasp: Functional  Coordination: Impaired  Extremities:   RUE   RUE : Within Functional Limits, LUE   LUE: Within Functional " Limits  Outcome Measures: [facility] Daily Activity  Putting on and taking off regular lower body clothing: A lot  Bathing (including washing, rinsing, drying): A lot  Putting on and taking off regular upper body clothing: A lot  Toileting, which includes using toilet, bedpan or urinal: A lot  Taking care of personal grooming such as brushing teeth: A lot  Eating Meals: A little  Daily Activity - Total Score: 13         ,     OT Adult Other Outcome Measures  4AT: 10 - possible delirium and/or cognitive impairment    Education Documentation  Body Mechanics, taught by Jody Jay OT at 5/16/2025 11:15 AM.  Learner: Significant Other, Patient  Readiness: Acceptance  Method: Explanation  Response: Verbalizes Understanding, Needs Reinforcement    Precautions, taught by Jody Jay OT at 5/16/2025 11:15 AM.  Learner: Significant Other, Patient  Readiness: Acceptance  Method: Explanation  Response: Verbalizes Understanding, Needs Reinforcement    ADL Training, taught by Jody Jay OT at 5/16/2025 11:15 AM.  Learner: Significant Other, Patient  Readiness: Acceptance  Method: Explanation  Response: Verbalizes Understanding, Needs Reinforcement    Education Comments  No comments found.        Goals:   Encounter Problems       Encounter Problems (Active)       ADLs       Patient with complete upper body dressing with contact guard assist level of assistance donning and doffing all UE clothes with PRN adaptive equipment while edge of bed  (Progressing)       Start:  05/16/25    Expected End:  05/30/25            Patient with complete lower body dressing with minimal assist  level of assistance donning and doffing all LE clothes  with PRN adaptive equipment while edge of bed  (Progressing)       Start:  05/16/25    Expected End:  05/30/25            Patient will feed self with set-up level of assistance using PRN adaptive equipment.       Start:  05/16/25    Expected End:  05/30/25            Patient will complete daily  grooming tasks brushing teeth and washing face/hair with set-up level of assistance and PRN adaptive equipment while edge of bed .       Start:  05/16/25    Expected End:  05/30/25            Patient will complete toileting including hygiene clothing management/hygiene with minimal assist  level of assistance and raised toilet seat.       Start:  05/16/25    Expected End:  05/30/25               COGNITION/SAFETY       Patient will score WFL on standardized cognitive assessment with PRN cues and within reasonable time frame       Start:  05/16/25    Expected End:  05/30/25               MOBILITY       Patient will perform Functional mobility mod  Household distances/Community Distances with CGA level of assistance and least restrictive device in order to improve safety and functional mobility.       Start:  05/16/25    Expected End:  05/30/25               TRANSFERS       Patient will perform bed mobility contact guard assist level of assistance and bed rails in order to improve safety and independence with mobility (Progressing)       Start:  05/16/25    Expected End:  05/30/25            Patient will complete functional transfer to chair/toilet with least restrictive device with minimal assist  level of assistance. (Progressing)       Start:  05/16/25    Expected End:  05/30/25                   Treatment Completed on Evaluation    Activities of Daily Living:       UE Bathing  UE Bathing Level of Assistance: Maximum assistance  UE Bathing Where Assessed: Edge of bed  UE Bathing Comments: Max verbal cues to initiate and attention to task  LE Bathing  LE Bathing Level of Assistance: Maximum assistance  LE Bathing Where Assessed: Edge of bed  LE Bathing Comments: Max VC for initiation, pt able to assit w/ tops of legs  UE Dressing  UE Dressing Level of Assistance: Maximum assistance  UE Dressing Where Assessed: Edge of bed  UE Dressing Comments: Assist for threading BUE and tying gown  LE Dressing  LE Dressing:  Yes  Pants Level of Assistance: Maximum assistance  LE Dressing Where Assessed: Edge of bed  LE Dressing Comments: Pt able to assist w/ holding pants while threading however Tribe required to thread BLE and TD for pulling up over hips      05/16/25 at 11:16 AM   RODRIGUE DELUCA, OT   Rehab Office: [phone]

## 2025-05-16 NOTE — PROGRESS NOTES
LSW met with patient and spouse Alicja at bedside. Wife states patient cannot go home after discharge and she prefers SNF. Wife provided 3 SNF choices in no specific order of preference: [facility], [facility]. OT recommends moderate intensity, pending PT eval. Referrals submitted to check insurance verification and bed availability. Will follow up.    1428-PT recommends moderate intensity. LSW notified wife. [facility] LSW requested SNF to start precert. Care Transitions will continue to follow.    SAILAJA Colby

## 2025-05-17 LAB
ALBUMIN SERPL BCP-MCNC: 3.4 G/DL (ref 3.4–5)
ANION GAP SERPL CALC-SCNC: 12 MMOL/L (ref 10–20)
APPEARANCE UR: CLEAR
BILIRUB UR STRIP.AUTO-MCNC: NEGATIVE MG/DL
BUN SERPL-MCNC: 14 MG/DL (ref 6–23)
CALCIUM SERPL-MCNC: 9.2 MG/DL (ref 8.6–10.6)
CHLORIDE SERPL-SCNC: 103 MMOL/L (ref 98–107)
CO2 SERPL-SCNC: 28 MMOL/L (ref 21–32)
COLOR UR: NORMAL
CREAT SERPL-MCNC: 0.82 MG/DL (ref 0.5–1.3)
EGFRCR SERPLBLD CKD-EPI 2021: >90 ML/MIN/1.73M*2
ERYTHROCYTE [DISTWIDTH] IN BLOOD BY AUTOMATED COUNT: 13.2 % (ref 11.5–14.5)
GLUCOSE BLD MANUAL STRIP-MCNC: 129 MG/DL (ref 74–99)
GLUCOSE BLD MANUAL STRIP-MCNC: 157 MG/DL (ref 74–99)
GLUCOSE BLD MANUAL STRIP-MCNC: 159 MG/DL (ref 74–99)
GLUCOSE SERPL-MCNC: 118 MG/DL (ref 74–99)
GLUCOSE UR STRIP.AUTO-MCNC: NORMAL MG/DL
HCT VFR BLD AUTO: 39.8 % (ref 41–52)
HGB BLD-MCNC: 12.6 G/DL (ref 13.5–17.5)
HOLD SPECIMEN: NORMAL
KETONES UR STRIP.AUTO-MCNC: NEGATIVE MG/DL
LEUKOCYTE ESTERASE UR QL STRIP.AUTO: NEGATIVE
MCH RBC QN AUTO: 31.6 PG (ref 26–34)
MCHC RBC AUTO-ENTMCNC: 31.7 G/DL (ref 32–36)
MCV RBC AUTO: 100 FL (ref 80–100)
NITRITE UR QL STRIP.AUTO: NEGATIVE
NRBC BLD-RTO: 0 /100 WBCS (ref 0–0)
PH UR STRIP.AUTO: 7 [PH]
PHOSPHATE SERPL-MCNC: 4 MG/DL (ref 2.5–4.9)
PLATELET # BLD AUTO: 182 X10*3/UL (ref 150–450)
POTASSIUM SERPL-SCNC: 4.3 MMOL/L (ref 3.5–5.3)
PROT UR STRIP.AUTO-MCNC: NEGATIVE MG/DL
RBC # BLD AUTO: 3.99 X10*6/UL (ref 4.5–5.9)
RBC # UR STRIP.AUTO: NEGATIVE MG/DL
SODIUM SERPL-SCNC: 139 MMOL/L (ref 136–145)
SP GR UR STRIP.AUTO: 1.01
UROBILINOGEN UR STRIP.AUTO-MCNC: NORMAL MG/DL
WBC # BLD AUTO: 11.6 X10*3/UL (ref 4.4–11.3)

## 2025-05-17 PROCEDURE — 85027 COMPLETE CBC AUTOMATED: CPT

## 2025-05-17 PROCEDURE — 80069 RENAL FUNCTION PANEL: CPT

## 2025-05-17 PROCEDURE — 1200000002 HC GENERAL ROOM WITH TELEMETRY DAILY

## 2025-05-17 PROCEDURE — 2500000001 HC RX 250 WO HCPCS SELF ADMINISTERED DRUGS (ALT 637 FOR MEDICARE OP)

## 2025-05-17 PROCEDURE — 36415 COLL VENOUS BLD VENIPUNCTURE: CPT

## 2025-05-17 PROCEDURE — 81003 URINALYSIS AUTO W/O SCOPE: CPT

## 2025-05-17 PROCEDURE — 2500000004 HC RX 250 GENERAL PHARMACY W/ HCPCS (ALT 636 FOR OP/ED): Mod: JZ | Performed by: STUDENT IN AN ORGANIZED HEALTH CARE EDUCATION/TRAINING PROGRAM

## 2025-05-17 PROCEDURE — 2500000004 HC RX 250 GENERAL PHARMACY W/ HCPCS (ALT 636 FOR OP/ED): Mod: JZ

## 2025-05-17 PROCEDURE — 2500000002 HC RX 250 W HCPCS SELF ADMINISTERED DRUGS (ALT 637 FOR MEDICARE OP, ALT 636 FOR OP/ED)

## 2025-05-17 PROCEDURE — 2500000004 HC RX 250 GENERAL PHARMACY W/ HCPCS (ALT 636 FOR OP/ED)

## 2025-05-17 PROCEDURE — 82947 ASSAY GLUCOSE BLOOD QUANT: CPT

## 2025-05-17 RX ORDER — LABETALOL HYDROCHLORIDE 5 MG/ML
10 INJECTION, SOLUTION INTRAVENOUS
Status: DISCONTINUED | OUTPATIENT
Start: 2025-05-17 | End: 2025-05-19 | Stop reason: HOSPADM

## 2025-05-17 RX ORDER — HYDRALAZINE HYDROCHLORIDE 20 MG/ML
5 INJECTION INTRAMUSCULAR; INTRAVENOUS
Status: DISCONTINUED | OUTPATIENT
Start: 2025-05-17 | End: 2025-05-19 | Stop reason: HOSPADM

## 2025-05-17 RX ORDER — ENOXAPARIN SODIUM 100 MG/ML
40 INJECTION SUBCUTANEOUS EVERY 24 HOURS
Status: DISCONTINUED | OUTPATIENT
Start: 2025-05-17 | End: 2025-05-19 | Stop reason: HOSPADM

## 2025-05-17 RX ADMIN — ATORVASTATIN CALCIUM 80 MG: 80 TABLET, FILM COATED ORAL at 08:46

## 2025-05-17 RX ADMIN — SENNOSIDES AND DOCUSATE SODIUM 2 TABLET: 50; 8.6 TABLET ORAL at 08:46

## 2025-05-17 RX ADMIN — POLYETHYLENE GLYCOL 3350 17 G: 17 POWDER, FOR SOLUTION ORAL at 08:46

## 2025-05-17 RX ADMIN — MAGNESIUM OXIDE TAB 400 MG (241.3 MG ELEMENTAL MG) 1 TABLET: 400 (241.3 MG) TAB at 08:46

## 2025-05-17 RX ADMIN — ACETAMINOPHEN 650 MG: 325 TABLET, FILM COATED ORAL at 12:39

## 2025-05-17 RX ADMIN — HYDROMORPHONE HYDROCHLORIDE 0.2 MG: 0.5 INJECTION, SOLUTION INTRAMUSCULAR; INTRAVENOUS; SUBCUTANEOUS at 22:43

## 2025-05-17 RX ADMIN — HYDRALAZINE HYDROCHLORIDE 5 MG: 20 INJECTION INTRAMUSCULAR; INTRAVENOUS at 02:11

## 2025-05-17 RX ADMIN — INSULIN LISPRO 2 UNITS: 100 INJECTION, SOLUTION INTRAVENOUS; SUBCUTANEOUS at 17:30

## 2025-05-17 RX ADMIN — ACETAMINOPHEN 650 MG: 325 TABLET, FILM COATED ORAL at 17:30

## 2025-05-17 RX ADMIN — SENNOSIDES AND DOCUSATE SODIUM 2 TABLET: 50; 8.6 TABLET ORAL at 21:01

## 2025-05-17 RX ADMIN — LABETALOL HYDROCHLORIDE 10 MG: 5 INJECTION INTRAVENOUS at 04:22

## 2025-05-17 RX ADMIN — ACETAMINOPHEN 650 MG: 325 TABLET, FILM COATED ORAL at 05:56

## 2025-05-17 RX ADMIN — OXYCODONE 5 MG: 5 TABLET ORAL at 21:01

## 2025-05-17 RX ADMIN — OXYCODONE HYDROCHLORIDE AND ACETAMINOPHEN 250 MG: 500 TABLET ORAL at 08:46

## 2025-05-17 RX ADMIN — LABETALOL HYDROCHLORIDE 10 MG: 5 INJECTION INTRAVENOUS at 01:06

## 2025-05-17 RX ADMIN — METOPROLOL TARTRATE 12.5 MG: 25 TABLET, FILM COATED ORAL at 21:01

## 2025-05-17 RX ADMIN — FERROUS SULFATE TAB 325 MG (65 MG ELEMENTAL FE) 1 TABLET: 325 (65 FE) TAB at 08:46

## 2025-05-17 RX ADMIN — METOPROLOL TARTRATE 12.5 MG: 25 TABLET, FILM COATED ORAL at 08:46

## 2025-05-17 RX ADMIN — Medication 2 TABLET: at 08:46

## 2025-05-17 RX ADMIN — DONEPEZIL HYDROCHLORIDE 10 MG: 10 TABLET, FILM COATED ORAL at 21:01

## 2025-05-17 RX ADMIN — POLYETHYLENE GLYCOL 3350 17 G: 17 POWDER, FOR SOLUTION ORAL at 21:02

## 2025-05-17 RX ADMIN — INSULIN LISPRO 2 UNITS: 100 INJECTION, SOLUTION INTRAVENOUS; SUBCUTANEOUS at 12:40

## 2025-05-17 RX ADMIN — ENOXAPARIN SODIUM 40 MG: 100 INJECTION SUBCUTANEOUS at 06:19

## 2025-05-17 ASSESSMENT — COGNITIVE AND FUNCTIONAL STATUS - GENERAL
DRESSING REGULAR UPPER BODY CLOTHING: A LITTLE
DAILY ACTIVITIY SCORE: 18
WALKING IN HOSPITAL ROOM: A LOT
MOVING TO AND FROM BED TO CHAIR: A LITTLE
STANDING UP FROM CHAIR USING ARMS: A LITTLE
TOILETING: A LITTLE
EATING MEALS: A LITTLE
DRESSING REGULAR LOWER BODY CLOTHING: A LITTLE
HELP NEEDED FOR BATHING: A LITTLE
MOBILITY SCORE: 16
CLIMB 3 TO 5 STEPS WITH RAILING: A LOT
MOVING FROM LYING ON BACK TO SITTING ON SIDE OF FLAT BED WITH BEDRAILS: A LITTLE
PERSONAL GROOMING: A LITTLE
TURNING FROM BACK TO SIDE WHILE IN FLAT BAD: A LITTLE

## 2025-05-17 ASSESSMENT — PAIN SCALES - GENERAL
PAINLEVEL_OUTOF10: 4
PAINLEVEL_OUTOF10: 0 - NO PAIN

## 2025-05-17 ASSESSMENT — PAIN DESCRIPTION - DESCRIPTORS: DESCRIPTORS: ACHING

## 2025-05-17 ASSESSMENT — PAIN - FUNCTIONAL ASSESSMENT
PAIN_FUNCTIONAL_ASSESSMENT: 0-10
PAIN_FUNCTIONAL_ASSESSMENT: 0-10

## 2025-05-17 NOTE — PROGRESS NOTES
"  Department of Neurosurgery  Daily Progress Note    Patient Name: Nahid Gastelum  MRN: [id number]  Date:  05/17/25     Subjective  Patient seen this morning with wife at bedside. States he is tired this morning and has minimal post op pain.  Denies any fever, chills, night sweats, CP, SOB, headache, extremity numbness/weakness, vision changes, palpitations, nausea, vomiting, or abdominal pain/discomfort.    Overnight Events  No acute events overnight    Procedures  5/15 R occipital VPS (Certas at 5)    Objective    Vital Signs  BP (!) 184/87 (BP Location: Left arm, Patient Position: Lying)   Pulse 96   Temp 36 °C (96.8 °F) (Temporal)   Resp 16   Ht 1.702 m (5' 7\")   Wt 65.6 kg (144 lb 10 oz)   SpO2 100%   BMI 22.65 kg/m²      Physical Exam  Constitutional: A&Ox3, calm and cooperative, NAD.  Eyes: PERRL, clear sclera .  ENMT: Moist mucous membranes, no apparent injuries or lesions.  Head/Neck: Neck supple, no JVD. NC/AT.   Cardiovascular: Normal rate and regular rhythm. 2+ equal pulses of the distal extremities.  Respiratory/Thorax: CTAB, regular respirations on RA. Good symmetric chest expansion.   Gastrointestinal: Abdomen soft, non tender.   Genitourinary: Voiding independently.   Extremities: No peripheral edema. Moving all 4 extremities actively.   Psychological: Appropriate mood and behavior.   Neurological:   A&Ox3  BUE 5/5  BLE 5/5  Skin: surgical incision C/D/I      Assessment/Plan  Nahid Gastelum is a 75 y.o. male with a past medical history of HTN, HLD, Afib, MVR (on Warfarin), TIA, DM, BCC, urinary frequency/BPH, dementia, NPH. He was seen in clinic and for VPS planning.   5/15 s/p R occipital VPS (Certas at 5), [facility] cath in position. He was then admitted to neurosurgery service post operatively.     # S/p R Occipital VPS (certas at 5)   - PT/OT eval post op  - Monitor Neuro checks Q4hrs  - Monitor VS/pulse ox Q4hrs   - Monitor AM CBC/RFP    # Acute postoperative pain  - Well controlled per patient, " continue current regimen  -Scheduled Tylenol 650mg PO [id number]   -Oxycodone 5/10mg PO [id number] PRN mod/severe pain   -IV Dilaudid 0.2mg Q4hr PRN for breakthrough pain  - Bowel regimen while using narcotics  - IV Zofran PRN nausea due to narcotics   - Pain assessments [id number]     # Hx of HTN  - Continue home Metoprolol 12.5mg BID  - PRN Hydralazine and Labetalol for SBP >160  - Monitor BP Q4hr and PRN    # Hx of HLD  - Continue home Atorvastatin 80mg daily    # Hx of Afib  # Hx of MVR  - Hold home Warfarin, resume POD5 (5/20)    # Hx of DM  - Hold home Zetia while inpatient  - SSI  - Monitor BG  - Carb controlled diet    # Hx of Dementia  - Continue home Aricept 10mg nightly    Prophylaxis:   - DVT: SQH Q8hr POD2, SCDs, encourage ambulation   - Encourage IS x10 every hour while awake     FEN/GI:  - Monitor/replete electrolytes PRN   - Continue carb controlled diet   - GI protection with Protonix 40mg daily   - Bowel regimen: Scheduled Miralax and pericolace daily        Disposition: Pending SNF placement

## 2025-05-17 NOTE — PROGRESS NOTES
Per [facility], precert remains pending.   Maribell VIVARN, RN  Transitional Care Coordinator (TCC)  [phone]

## 2025-05-18 LAB
GLUCOSE BLD MANUAL STRIP-MCNC: 136 MG/DL (ref 74–99)
GLUCOSE BLD MANUAL STRIP-MCNC: 138 MG/DL (ref 74–99)
GLUCOSE BLD MANUAL STRIP-MCNC: 168 MG/DL (ref 74–99)

## 2025-05-18 PROCEDURE — 2500000002 HC RX 250 W HCPCS SELF ADMINISTERED DRUGS (ALT 637 FOR MEDICARE OP, ALT 636 FOR OP/ED)

## 2025-05-18 PROCEDURE — 2500000004 HC RX 250 GENERAL PHARMACY W/ HCPCS (ALT 636 FOR OP/ED)

## 2025-05-18 PROCEDURE — 1200000002 HC GENERAL ROOM WITH TELEMETRY DAILY

## 2025-05-18 PROCEDURE — 2500000004 HC RX 250 GENERAL PHARMACY W/ HCPCS (ALT 636 FOR OP/ED): Mod: JZ | Performed by: STUDENT IN AN ORGANIZED HEALTH CARE EDUCATION/TRAINING PROGRAM

## 2025-05-18 PROCEDURE — 2500000001 HC RX 250 WO HCPCS SELF ADMINISTERED DRUGS (ALT 637 FOR MEDICARE OP)

## 2025-05-18 PROCEDURE — 82947 ASSAY GLUCOSE BLOOD QUANT: CPT

## 2025-05-18 RX ADMIN — METOPROLOL TARTRATE 12.5 MG: 25 TABLET, FILM COATED ORAL at 09:55

## 2025-05-18 RX ADMIN — ACETAMINOPHEN 650 MG: 325 TABLET, FILM COATED ORAL at 12:29

## 2025-05-18 RX ADMIN — SENNOSIDES AND DOCUSATE SODIUM 2 TABLET: 50; 8.6 TABLET ORAL at 09:55

## 2025-05-18 RX ADMIN — MAGNESIUM OXIDE TAB 400 MG (241.3 MG ELEMENTAL MG) 1 TABLET: 400 (241.3 MG) TAB at 09:55

## 2025-05-18 RX ADMIN — ACETAMINOPHEN 650 MG: 325 TABLET, FILM COATED ORAL at 23:06

## 2025-05-18 RX ADMIN — FLUTICASONE PROPIONATE 1 SPRAY: 50 SPRAY, METERED NASAL at 06:03

## 2025-05-18 RX ADMIN — METOPROLOL TARTRATE 12.5 MG: 25 TABLET, FILM COATED ORAL at 20:56

## 2025-05-18 RX ADMIN — POLYETHYLENE GLYCOL 3350 17 G: 17 POWDER, FOR SOLUTION ORAL at 20:56

## 2025-05-18 RX ADMIN — ACETAMINOPHEN 650 MG: 325 TABLET, FILM COATED ORAL at 04:18

## 2025-05-18 RX ADMIN — SENNOSIDES AND DOCUSATE SODIUM 2 TABLET: 50; 8.6 TABLET ORAL at 20:56

## 2025-05-18 RX ADMIN — ATORVASTATIN CALCIUM 80 MG: 80 TABLET, FILM COATED ORAL at 09:56

## 2025-05-18 RX ADMIN — OXYCODONE HYDROCHLORIDE AND ACETAMINOPHEN 250 MG: 500 TABLET ORAL at 09:55

## 2025-05-18 RX ADMIN — FERROUS SULFATE TAB 325 MG (65 MG ELEMENTAL FE) 1 TABLET: 325 (65 FE) TAB at 09:56

## 2025-05-18 RX ADMIN — OXYCODONE 5 MG: 5 TABLET ORAL at 04:18

## 2025-05-18 RX ADMIN — Medication 2 TABLET: at 09:55

## 2025-05-18 RX ADMIN — DONEPEZIL HYDROCHLORIDE 10 MG: 10 TABLET, FILM COATED ORAL at 20:56

## 2025-05-18 RX ADMIN — ENOXAPARIN SODIUM 40 MG: 100 INJECTION SUBCUTANEOUS at 06:03

## 2025-05-18 RX ADMIN — INSULIN LISPRO 2 UNITS: 100 INJECTION, SOLUTION INTRAVENOUS; SUBCUTANEOUS at 12:29

## 2025-05-18 RX ADMIN — ACETAMINOPHEN 650 MG: 325 TABLET, FILM COATED ORAL at 16:20

## 2025-05-18 ASSESSMENT — COGNITIVE AND FUNCTIONAL STATUS - GENERAL
EATING MEALS: A LITTLE
WALKING IN HOSPITAL ROOM: A LITTLE
HELP NEEDED FOR BATHING: A LITTLE
TOILETING: A LITTLE
MOVING TO AND FROM BED TO CHAIR: A LITTLE
CLIMB 3 TO 5 STEPS WITH RAILING: A LOT
TURNING FROM BACK TO SIDE WHILE IN FLAT BAD: A LITTLE
PERSONAL GROOMING: A LITTLE
STANDING UP FROM CHAIR USING ARMS: A LITTLE
DAILY ACTIVITIY SCORE: 18
MOBILITY SCORE: 18
DRESSING REGULAR UPPER BODY CLOTHING: A LITTLE
DRESSING REGULAR LOWER BODY CLOTHING: A LITTLE

## 2025-05-18 ASSESSMENT — PAIN - FUNCTIONAL ASSESSMENT
PAIN_FUNCTIONAL_ASSESSMENT: 0-10

## 2025-05-18 ASSESSMENT — PAIN SCALES - GENERAL
PAINLEVEL_OUTOF10: 0 - NO PAIN

## 2025-05-18 NOTE — PROGRESS NOTES
"  Department of Neurosurgery  Daily Progress Note    Patient Name: Nahid Gastelum  MRN: [id number]  Date:  05/18/25     Subjective  Patient seen this morning with wife at bedside. States he is tired this morning and has minimal post op pain.  Denies any fever, chills, night sweats, CP, SOB, headache, extremity numbness/weakness, vision changes, palpitations, nausea, vomiting, or abdominal pain/discomfort.    Overnight Events  No acute events overnight    Procedures  5/15 R occipital VPS (Certas at 5)    Objective    Vital Signs  /74 (BP Location: Left arm, Patient Position: Lying)   Pulse 86   Temp 36.6 °C (97.9 °F) (Temporal)   Resp 18   Ht 1.702 m (5' 7\")   Wt 65.6 kg (144 lb 10 oz)   SpO2 99%   BMI 22.65 kg/m²      Physical Exam  Constitutional: A&Ox3, calm and cooperative, NAD.  Eyes: PERRL, clear sclera .  ENMT: Moist mucous membranes, no apparent injuries or lesions.  Head/Neck: Neck supple, no JVD. NC/AT.   Cardiovascular: Normal rate and regular rhythm. 2+ equal pulses of the distal extremities.  Respiratory/Thorax: CTAB, regular respirations on RA. Good symmetric chest expansion.   Gastrointestinal: Abdomen soft, non tender.   Genitourinary: Voiding independently.   Extremities: No peripheral edema. Moving all 4 extremities actively.   Psychological: Appropriate mood and behavior.   Neurological:   A&Ox3  BUE 5/5  BLE 5/5  Skin: surgical incision C/D/I      Assessment/Plan  Nahid Gastelum is a 75 y.o. male with a past medical history of HTN, HLD, Afib, MVR (on Warfarin), TIA, DM, BCC, urinary frequency/BPH, dementia, NPH. He was seen in clinic and for VPS planning.   5/15 s/p R occipital VPS (Certas at 5), [facility] cath in position. He was then admitted to neurosurgery service post operatively.     # S/p R Occipital VPS (certas at 5)   - PT/OT eval post op  - Monitor Neuro checks Q4hrs  - Monitor VS/pulse ox Q4hrs   - Monitor AM CBC/RFP    # Acute postoperative pain  - Well controlled per patient, " continue current regimen  -Scheduled Tylenol 650mg PO [id number]   -Oxycodone 5/10mg PO [id number] PRN mod/severe pain   -IV Dilaudid 0.2mg Q4hr PRN for breakthrough pain  - Bowel regimen while using narcotics  - IV Zofran PRN nausea due to narcotics   - Pain assessments [id number]     # Hx of HTN  - Continue home Metoprolol 12.5mg BID  - PRN Hydralazine and Labetalol for SBP >160  - Monitor BP Q4hr and PRN    # Hx of HLD  - Continue home Atorvastatin 80mg daily    # Hx of Afib  # Hx of MVR  - Hold home Warfarin, resume POD5 (5/20)    # Hx of DM  - Hold home Zetia while inpatient  - SSI  - Monitor BG  - Carb controlled diet    # Hx of Dementia  - Continue home Aricept 10mg nightly    Prophylaxis:   - DVT: SQH Q8hr POD2, SCDs, encourage ambulation   - Encourage IS x10 every hour while awake     FEN/GI:  - Monitor/replete electrolytes PRN   - Continue carb controlled diet   - GI protection with Protonix 40mg daily   - Bowel regimen: Scheduled Miralax and pericolace daily        Disposition: Pending SNF placement

## 2025-05-18 NOTE — CARE PLAN
The patient's goals for the shift include      The clinical goals for the shift include PT will remain safe and free from injury throughout this shift      Problem: Pain - Adult  Goal: Verbalizes/displays adequate comfort level or baseline comfort level  Outcome: Progressing     Problem: Safety - Adult  Goal: Free from fall injury  Outcome: Progressing     Problem: Discharge Planning  Goal: Discharge to home or other facility with appropriate resources  Outcome: Progressing     Problem: Chronic Conditions and Co-morbidities  Goal: Patient's chronic conditions and co-morbidity symptoms are monitored and maintained or improved  Outcome: Progressing     Problem: Nutrition  Goal: Nutrient intake appropriate for maintaining nutritional needs  Outcome: Progressing     Problem: Fall/Injury  Goal: Not fall by end of shift  Outcome: Progressing  Goal: Be free from injury by end of the shift  Outcome: Progressing  Goal: Verbalize understanding of personal risk factors for fall in the hospital  Outcome: Progressing  Goal: Verbalize understanding of risk factor reduction measures to prevent injury from fall in the home  Outcome: Progressing  Goal: Use assistive devices by end of the shift  Outcome: Progressing  Goal: Pace activities to prevent fatigue by end of the shift  Outcome: Progressing     Problem: Skin  Goal: Decreased wound size/increased tissue granulation at next dressing change  Outcome: Progressing  Flowsheets (Taken 5/18/2025 0526)  Decreased wound size/increased tissue granulation at next dressing change: Promote sleep for wound healing  Goal: Participates in plan/prevention/treatment measures  Outcome: Progressing  Flowsheets (Taken 5/18/2025 0526)  Participates in plan/prevention/treatment measures: Elevate heels  Goal: Prevent/manage excess moisture  Outcome: Progressing  Flowsheets (Taken 5/18/2025 0526)  Prevent/manage excess moisture:   Moisturize dry skin   Cleanse incontinence/protect with barrier  cream  Goal: Prevent/minimize sheer/friction injuries  Outcome: Progressing  Flowsheets (Taken 5/18/2025 0526)  Prevent/minimize sheer/friction injuries:   HOB 30 degrees or less   Increase activity/out of bed for meals   Turn/reposition every 2 hours/use positioning/transfer devices  Goal: Promote/optimize nutrition  Outcome: Progressing  Flowsheets (Taken 5/18/2025 0526)  Promote/optimize nutrition:   Consume > 50% meals/supplements   Monitor/record intake including meals  Goal: Promote skin healing  Outcome: Progressing  Flowsheets (Taken 5/18/2025 0526)  Promote skin healing: Turn/reposition every 2 hours/use positioning/transfer devices     Problem: Respiratory  Goal: Clear secretions with interventions this shift  Outcome: Progressing  Goal: Minimize anxiety/maximize coping throughout shift  Outcome: Progressing  Goal: Minimal/no exertional discomfort or dyspnea this shift  Outcome: Progressing  Goal: No signs of respiratory distress (eg. Use of accessory muscles. Peds grunting)  Outcome: Progressing  Goal: Patent airway maintained this shift  Outcome: Progressing  Goal: Tolerate mechanical ventilation evidenced by VS/agitation level this shift  Outcome: Progressing  Goal: Tolerate pulmonary toileting this shift  Outcome: Progressing  Goal: Verbalize decreased shortness of breath this shift  Outcome: Progressing  Goal: Wean oxygen to maintain O2 saturation per order/standard this shift  Outcome: Progressing  Goal: Increase self care and/or family involvement in next 24 hours  Outcome: Progressing

## 2025-05-19 VITALS
WEIGHT: 144.62 LBS | DIASTOLIC BLOOD PRESSURE: 77 MMHG | OXYGEN SATURATION: 95 % | SYSTOLIC BLOOD PRESSURE: 120 MMHG | TEMPERATURE: 97 F | HEART RATE: 86 BPM | BODY MASS INDEX: 22.7 KG/M2 | HEIGHT: 67 IN | RESPIRATION RATE: 18 BRPM

## 2025-05-19 VITALS
BODY MASS INDEX: 22.7 KG/M2 | RESPIRATION RATE: 16 BRPM | HEART RATE: 86 BPM | WEIGHT: 144.62 LBS | HEIGHT: 67 IN | TEMPERATURE: 98.6 F | SYSTOLIC BLOOD PRESSURE: 134 MMHG | DIASTOLIC BLOOD PRESSURE: 79 MMHG | OXYGEN SATURATION: 100 %

## 2025-05-19 LAB
GLUCOSE BLD MANUAL STRIP-MCNC: 130 MG/DL (ref 74–99)
GLUCOSE BLD MANUAL STRIP-MCNC: 133 MG/DL (ref 74–99)
GLUCOSE BLD MANUAL STRIP-MCNC: 183 MG/DL (ref 74–99)

## 2025-05-19 PROCEDURE — 2500000002 HC RX 250 W HCPCS SELF ADMINISTERED DRUGS (ALT 637 FOR MEDICARE OP, ALT 636 FOR OP/ED)

## 2025-05-19 PROCEDURE — 99239 HOSP IP/OBS DSCHRG MGMT >30: CPT

## 2025-05-19 PROCEDURE — 2500000004 HC RX 250 GENERAL PHARMACY W/ HCPCS (ALT 636 FOR OP/ED): Mod: JZ | Performed by: STUDENT IN AN ORGANIZED HEALTH CARE EDUCATION/TRAINING PROGRAM

## 2025-05-19 PROCEDURE — 97116 GAIT TRAINING THERAPY: CPT | Mod: GP

## 2025-05-19 PROCEDURE — 2500000001 HC RX 250 WO HCPCS SELF ADMINISTERED DRUGS (ALT 637 FOR MEDICARE OP)

## 2025-05-19 PROCEDURE — 97535 SELF CARE MNGMENT TRAINING: CPT | Mod: GO

## 2025-05-19 PROCEDURE — 2500000004 HC RX 250 GENERAL PHARMACY W/ HCPCS (ALT 636 FOR OP/ED)

## 2025-05-19 PROCEDURE — 97530 THERAPEUTIC ACTIVITIES: CPT | Mod: GP

## 2025-05-19 PROCEDURE — 97110 THERAPEUTIC EXERCISES: CPT | Mod: GP

## 2025-05-19 PROCEDURE — 82947 ASSAY GLUCOSE BLOOD QUANT: CPT

## 2025-05-19 RX ORDER — DICLOFENAC SODIUM 10 MG/G
4 GEL TOPICAL 4 TIMES DAILY PRN
Start: 2025-05-19

## 2025-05-19 RX ORDER — DICLOFENAC SODIUM 10 MG/G
4 GEL TOPICAL 4 TIMES DAILY PRN
Status: DISCONTINUED | OUTPATIENT
Start: 2025-05-19 | End: 2025-05-19 | Stop reason: HOSPADM

## 2025-05-19 RX ORDER — SPIRONOLACTONE 25 MG/1
25 TABLET ORAL DAILY
Start: 2025-05-19

## 2025-05-19 RX ORDER — OXYCODONE HYDROCHLORIDE 5 MG/1
5 TABLET ORAL EVERY 6 HOURS PRN
Qty: 20 TABLET | Refills: 0 | Status: SHIPPED | OUTPATIENT
Start: 2025-05-19

## 2025-05-19 RX ORDER — POLYETHYLENE GLYCOL 3350 17 G/17G
17 POWDER, FOR SOLUTION ORAL EVERY 12 HOURS
Start: 2025-05-19

## 2025-05-19 RX ORDER — AMOXICILLIN 250 MG
2 CAPSULE ORAL 2 TIMES DAILY
Start: 2025-05-19

## 2025-05-19 RX ORDER — MINOCYCLINE HYDROCHLORIDE 100 MG/1
100 CAPSULE ORAL DAILY
Start: 2025-05-19

## 2025-05-19 RX ORDER — TAMSULOSIN HYDROCHLORIDE 0.4 MG/1
0.4 CAPSULE ORAL 2 TIMES DAILY
Start: 2025-05-19

## 2025-05-19 RX ORDER — ACETAMINOPHEN 325 MG/1
650 TABLET ORAL EVERY 6 HOURS PRN
Start: 2025-05-19

## 2025-05-19 RX ADMIN — HYDRALAZINE HYDROCHLORIDE 5 MG: 20 INJECTION INTRAMUSCULAR; INTRAVENOUS at 07:52

## 2025-05-19 RX ADMIN — SENNOSIDES AND DOCUSATE SODIUM 2 TABLET: 50; 8.6 TABLET ORAL at 08:44

## 2025-05-19 RX ADMIN — ENOXAPARIN SODIUM 40 MG: 100 INJECTION SUBCUTANEOUS at 06:40

## 2025-05-19 RX ADMIN — ACETAMINOPHEN 650 MG: 325 TABLET, FILM COATED ORAL at 11:49

## 2025-05-19 RX ADMIN — FERROUS SULFATE TAB 325 MG (65 MG ELEMENTAL FE) 1 TABLET: 325 (65 FE) TAB at 08:43

## 2025-05-19 RX ADMIN — ATORVASTATIN CALCIUM 80 MG: 80 TABLET, FILM COATED ORAL at 08:42

## 2025-05-19 RX ADMIN — Medication 2 TABLET: at 11:49

## 2025-05-19 RX ADMIN — POLYETHYLENE GLYCOL 3350 17 G: 17 POWDER, FOR SOLUTION ORAL at 08:42

## 2025-05-19 RX ADMIN — OXYCODONE 5 MG: 5 TABLET ORAL at 14:15

## 2025-05-19 RX ADMIN — OXYCODONE HYDROCHLORIDE AND ACETAMINOPHEN 250 MG: 500 TABLET ORAL at 08:44

## 2025-05-19 RX ADMIN — INSULIN LISPRO 2 UNITS: 100 INJECTION, SOLUTION INTRAVENOUS; SUBCUTANEOUS at 12:48

## 2025-05-19 RX ADMIN — ACETAMINOPHEN 650 MG: 325 TABLET, FILM COATED ORAL at 06:40

## 2025-05-19 RX ADMIN — MAGNESIUM OXIDE TAB 400 MG (241.3 MG ELEMENTAL MG) 1 TABLET: 400 (241.3 MG) TAB at 08:43

## 2025-05-19 RX ADMIN — METOPROLOL TARTRATE 12.5 MG: 25 TABLET, FILM COATED ORAL at 08:43

## 2025-05-19 ASSESSMENT — COGNITIVE AND FUNCTIONAL STATUS - GENERAL
WALKING IN HOSPITAL ROOM: A LOT
TURNING FROM BACK TO SIDE WHILE IN FLAT BAD: A LOT
STANDING UP FROM CHAIR USING ARMS: A LOT
MOBILITY SCORE: 12
MOVING TO AND FROM BED TO CHAIR: A LOT
DAILY ACTIVITIY SCORE: 17
DRESSING REGULAR UPPER BODY CLOTHING: A LITTLE
PERSONAL GROOMING: A LITTLE
DRESSING REGULAR LOWER BODY CLOTHING: A LITTLE
CLIMB 3 TO 5 STEPS WITH RAILING: TOTAL
TOILETING: A LOT
HELP NEEDED FOR BATHING: A LOT
MOVING FROM LYING ON BACK TO SITTING ON SIDE OF FLAT BED WITH BEDRAILS: A LITTLE

## 2025-05-19 ASSESSMENT — PAIN SCALES - GENERAL
PAINLEVEL_OUTOF10: 0 - NO PAIN
PAINLEVEL_OUTOF10: 7
PAINLEVEL_OUTOF10: 0 - NO PAIN
PAINLEVEL_OUTOF10: 0 - NO PAIN
PAINLEVEL_OUTOF10: 7

## 2025-05-19 ASSESSMENT — PAIN - FUNCTIONAL ASSESSMENT
PAIN_FUNCTIONAL_ASSESSMENT: 0-10

## 2025-05-19 ASSESSMENT — PAIN SCALES - WONG BAKER: WONGBAKER_NUMERICALRESPONSE: NO HURT

## 2025-05-19 ASSESSMENT — PAIN DESCRIPTION - LOCATION: LOCATION: HEAD

## 2025-05-19 ASSESSMENT — ACTIVITIES OF DAILY LIVING (ADL): HOME_MANAGEMENT_TIME_ENTRY: 23

## 2025-05-19 NOTE — CARE PLAN
The patient's goals for the shift include  Patient will be free from pain or injury     The clinical goals for the shift include Patient will be free from injury

## 2025-05-19 NOTE — PROGRESS NOTES
"  Department of Neurosurgery  Daily Progress Note    Patient Name: Nahid Gastelum  MRN: [id number]  Date:  05/19/25     Subjective  No acute events    Overnight Events  No acute events overnight    Procedures  5/15 R occipital VPS (Certas at 5)    Objective    Vital Signs  /79 (BP Location: Left arm, Patient Position: Lying)   Pulse 86   Temp 37 °C (98.6 °F) (Temporal)   Resp 16   Ht 1.702 m (5' 7\")   Wt 65.6 kg (144 lb 10 oz)   SpO2 100%   BMI 22.65 kg/m²      Physical Exam  Constitutional: A&Ox3, calm and cooperative, NAD.  Eyes: PERRL, clear sclera .  ENMT: Moist mucous membranes, no apparent injuries or lesions.  Head/Neck: Neck supple, no JVD. NC/AT.   Cardiovascular: Normal rate and regular rhythm. 2+ equal pulses of the distal extremities.  Respiratory/Thorax: CTAB, regular respirations on RA. Good symmetric chest expansion.   Gastrointestinal: Abdomen soft, non tender.   Genitourinary: Voiding independently.   Extremities: No peripheral edema. Moving all 4 extremities actively.   Psychological: Appropriate mood and behavior.   Neurological:   A&Ox3  BUE 5/5  BLE 5/5  Skin: surgical incision C/D/I      Assessment/Plan  Nahid Gastelum is a 75 y.o. male with a past medical history of HTN, HLD, Afib, MVR (on Warfarin), TIA, DM, BCC, urinary frequency/BPH, dementia, NPH. He was seen in clinic and for VPS planning.   5/15 s/p R occipital VPS (Certas at 5), [facility] cath in position. He was then admitted to neurosurgery service post operatively.     # S/p R Occipital VPS (certas at 5)   - PT/OT eval post op  - Monitor Neuro checks Q4hrs  - Monitor VS/pulse ox Q4hrs   - Monitor AM CBC/RFP    # Acute postoperative pain  - Well controlled per patient, continue current regimen  -Scheduled Tylenol 650mg PO [id number]   -Oxycodone 5/10mg PO [id number] PRN mod/severe pain   -IV Dilaudid 0.2mg Q4hr PRN for breakthrough pain  - Bowel regimen while using narcotics  - IV Zofran PRN nausea due to narcotics   - Pain assessments " [id number]     # Hx of HTN  - Continue home Metoprolol 12.5mg BID  - PRN Hydralazine and Labetalol for SBP >160  - Monitor BP Q4hr and PRN    # Hx of HLD  - Continue home Atorvastatin 80mg daily    # Hx of Afib  # Hx of MVR  - Hold home Warfarin, resume POD5 (5/20)    # Hx of DM  - Hold home Zetia while inpatient  - SSI  - Monitor BG  - Carb controlled diet    # Hx of Dementia  - Continue home Aricept 10mg nightly    Prophylaxis:   - DVT: SQH Q8hr POD2, SCDs, encourage ambulation   - Encourage IS x10 every hour while awake     FEN/GI:  - Monitor/replete electrolytes PRN   - Continue carb controlled diet   - GI protection with Protonix 40mg daily   - Bowel regimen: Scheduled Miralax and pericolace daily        Disposition: Pending SNF placement

## 2025-05-19 NOTE — PROGRESS NOTES
Physical Therapy    Physical Therapy Treatment    Patient Name: Nahid Gastelum  MRN: [id number]  Department: [place] [id number]  Room: [address] [place] [address]  Today's Date: 5/19/2025  Time Calculation  Start Time: 0940  Stop Time: 1021  Time Calculation (min): 41 min     Assessment/Plan   PT Assessment  Rehab Prognosis: Excellent  Caregiver Assistance: Caregiver assistance needed per identified barriers - however, level of patient's required assistance exceeds assistance available at home  Physical Needs: Ambulating household distances limited by function/safety, 24hr mobility assistance needed, High falls risk due to function or environment  Evaluation/Treatment Tolerance: Patient tolerated treatment well  Medical Staff Made Aware: Yes  End of Session Communication: Bedside nurse, Care Coordinator  Assessment Comment: Pt motivated with excellent effort.  Able to ambulate in lees several times today.  Remains appropriate for Mod intensity PT at time of d/c.  End of Session Patient Position: Up in chair, Alarm on     PT Plan  Treatment/Interventions: Bed mobility, Transfer training, Gait training, Balance training, Therapeutic exercise, Therapeutic activity  PT Plan: Ongoing PT  PT Frequency: 5 times per week  PT Discharge Recommendations: Moderate intensity level of continued care  PT Recommended Transfer Status: Assist x1, Assistive device (including ambulation)  PT - OK to Discharge: Yes    PT Visit Info:  PT Received On: 05/19/25  Response to Previous Treatment: Patient with no complaints from previous session.     General Visit Information:   General  Family/Caregiver Present: No  Caregiver Feedback: -  Prior to Session Communication: Bedside nurse  Patient Position Received: Bed, 3 rail up, Alarm on  General Comment: Supine.  Pt awakens to voice.  Much more alert and participatory this visit, with increased activity tolerance.  Able to ambulate to bathroom, and several times into hallway.  Tolerated well.    Subjective    Precautions:  Precautions  Medical Precautions: Fall precautions    Objective   Pain:  Pain Assessment  Pain Assessment: 0-10  0-10 (Numeric) Pain Score: 0 - No pain  Cognition:  Cognition  Overall Cognitive Status: Within Functional Limits  Arousal/Alertness: Appropriate responses to stimuli  Orientation Level: Oriented X4  Following Commands: Follows one step commands consistently    Activity Tolerance:  Activity Tolerance  Endurance: Endurance does not limit participation in activity  Treatments:  Therapeutic Exercise  Therapeutic Exercise Performed: Yes  Therapeutic Exercise Activity 1: Supine: AP 3x10, HS x10.  Sitting: LAQ, hip flx x10.  Standing: steps in place 2x10 with FWW, Min A    Bed Mobility 1  Bed Mobility 1: Supine to sitting  Level of Assistance 1: Moderate assistance, Moderate verbal cues, Moderate tactile cues    Ambulation/Gait Training 1  Surface 1: Level tile, Carpet  Device 1: Rolling walker  Assistance 1: Minimum assistance, Moderate assistance  Quality of Gait 1: Narrow base of support, Diminished heel strike, Inconsistent stride length, Decreased step length, Shuffling gait, Forward flexed posture (Able to correct markedly forward flexed posture, but requiring ongoing VCs)  Comments/Distance (ft) 1: 2x 15 feet, 40 feet, 2x 50 feet with seated breaks  Transfer 1  Transfer From 1: Sit to, Stand to  Transfer to 1: Sit  Transfer Device 1: Walker  Transfer Level of Assistance 1: Moderate assistance, Moderate verbal cues, Moderate tactile cues  Transfers 2  Transfer From 2: Bed to  Transfer to 2: Chair with arms  Transfer Device 2: Walker  Transfer Level of Assistance 2: Moderate assistance, Moderate verbal cues, Moderate tactile cues  Transfers 3  Transfer From 3: Stand to, Toilet to  Transfer to 3: Stand  Transfer Device 3: Walker  Transfer Level of Assistance 3: Moderate assistance, Moderate verbal cues, Moderate tactile cues    Stairs  Stairs: No    Outcome Measures:    [facility] Basic  Mobility  Turning from your back to your side while in a flat bed without using bedrails: A little  Moving from lying on your back to sitting on the side of a flat bed without using bedrails: A lot  Moving to and from bed to chair (including a wheelchair): A lot  Standing up from a chair using your arms (e.g. wheelchair or bedside chair): A lot  To walk in hospital room: A lot  Climbing 3-5 steps with railing: Total  Basic Mobility - Total Score: 12    Education Documentation  Mobility Training, taught by Pardeep Sanderson, PT at 5/19/2025 11:22 AM.  Learner: Patient  Readiness: Eager  Method: Explanation  Response: Verbalizes Understanding    Education Comments  No comments found.        OP EDUCATION:       Encounter Problems       Encounter Problems (Active)       Balance       Sitting EOB 20 minutes, distant  supervision for static sitting, close supervision for dynamic sitting activities  (Progressing)       Start:  05/16/25    Expected End:  05/30/25            Standing >5 minutes, FWW, SBA (Progressing)       Start:  05/16/25    Expected End:  05/30/25               Mobility       Ambulate >50 feet with FWW and Min A  (Progressing)       Start:  05/16/25    Expected End:  05/30/25               PT Transfers       Pt able to perform bed mobility CGA from a flat bed without rail.  (Progressing)       Start:  05/16/25    Expected End:  05/30/25            sit<>stand, bed<>chair, FWW, CGA (Progressing)       Start:  05/16/25    Expected End:  05/30/25               Pain - Adult

## 2025-05-19 NOTE — PROGRESS NOTES
Occupational Therapy    Occupational Therapy Treatment    Name: Nahid Gastelum  MRN: [id number]  : 1950  Date: 25  Room: [address] [place] [address]      Time Calculation  Start Time: 1358  Stop Time: 1421  Time Calculation (min): 23 min    Assessment:  OT Assessment: Pt demos improved functional ind w/ LBD at Min A and functional transfers at CGA-Evangelina w/ FWW. Continued skilled OT services are indicated to allow for a safe and funcitonal d/c.  Prognosis: Good  Barriers to Discharge Home: Caregiver assistance, Physical needs, Cognition needs  Caregiver Assistance: Caregiver assistance needed per identified barriers - however, level of patient's required assistance exceeds assistance available at home  Cognition Needs: 24hr supervision for safety awareness needed  Physical Needs: 24hr mobility assistance needed, 24hr ADL assistance needed, High falls risk due to function or environment  Evaluation/Treatment Tolerance: Patient tolerated treatment well  Medical Staff Made Aware: Yes  End of Session Communication: Bedside nurse  End of Session Patient Position: Up in chair, Alarm on  Plan:  Treatment Interventions: ADL retraining, Functional transfer training, Endurance training, Cognitive reorientation, Patient/family training, Neuromuscular reeducation, Fine motor coordination activities, Compensatory technique education  OT Discharge Recommendations: Moderate intensity level of continued care  Equipment Recommended upon Discharge:  (Tub bench)  OT Recommended Transfer Status: Assist of 1  OT - OK to Discharge: Yes    Subjective   General:  OT Last Visit  OT Received On: 25  Prior to Session Communication: Bedside nurse  Patient Position Received: Bed, 3 rail up, Alarm off, not on at start of session  Family/Caregiver Present: No  General Comment: Pt sup in bed on approach. Pleasant and agreeable to OT session.   Precautions:  Medical Precautions: Fall precautions    Lines/Tubes/Drains:  External Urinary Catheter Male  (Active)   Number of days: 4       Cognition:  Orientation Level: Oriented X4  Following Commands: Follows one step commands with increased time  Processing Speed: Delayed    Pain Assessment:  Pain Assessment  Pain Assessment: 0-10  0-10 (Numeric) Pain Score: 7  Pain Type: Acute pain, Surgical pain  Pain Location: Incision     Objective   Activities of Daily Living:     UE Bathing  UE Bathing Comments: Min A for bathing w/ assist for back and buttocks. Pt able to wash chest, stomach, arms, and tops of legs at s/u     UE Dressing  UE Dressing Level of Assistance: Minimum assistance  UE Dressing Where Assessed: Edge of bed  UE Dressing Comments: [place]/donning gown  LE Dressing  Pants Level of Assistance: Minimum assistance  LE Dressing Where Assessed: Edge of bed  LE Dressing Comments: Assist threading LLE and pulling up over hips w/ extended time required for task completion    Bed Mobility/Transfers:   Bed Mobility 1  Bed Mobility 1: Supine to sitting  Level of Assistance 1: Contact guard  Transfer 1  Transfer From 1: Bed to  Transfer to 1: Stand  Technique 1: Sit to stand  Transfer Device 1: Walker  Transfer Level of Assistance 1: Contact guard  Transfers 2  Transfer From 2: Stand to  Transfer to 2: Chair with arms  Technique 2: Stand pivot  Transfer Device 2: Walker  Transfer Level of Assistance 2: Minimum assistance    Outcome Measures:  [facility] Daily Activity  Putting on and taking off regular lower body clothing: A little  Bathing (including washing, rinsing, drying): A lot  Putting on and taking off regular upper body clothing: A little  Toileting, which includes using toilet, bedpan or urinal: A lot  Taking care of personal grooming such as brushing teeth: A little  Eating Meals: None  Daily Activity - Total Score: 17  OT Adult Other Outcome Measures  Modified Barthel Index (Cain version):  Modified Barthel Index  Chair/Bed Transfer: (8) The transfer requires the assistance of one other person. Assistance may  be required in any aspect of the transfer.  Ambulation: (8) Assistance is required with reaching aids and/or their manipulation. One person is required to offer assistance.  Stair Climbing: (2) Assistance is required in all aspects of chair climbing, including assistance with walking aids.  Toilet Transfers: (5) Assistance may be required with management of clothing, transferring, or washing hands.  Bowel Control: (5) The patient can assume appropriate position but cannot use facilitatory techniques or clean self without assistance and has frequent accidents. Assistance is required with incontinence aids such as pad, etc.  Bladder Control: (2) The patient is incontinent but is able to assist with the application of an internal or external device.  Bathing: (3) Assistance is required with either transfer to shower/bath or with washing or drying; including inability to complete a task because of condition or disease, etc.  Dressing: (5) Assistance is needed in putting on, and/or removing any clothing.  Personal Hygiene (Grooming): (4) Patient is able to conduct his/her own personal hygiene but requires minimal assistance before and/or after the operation.  Feeding: (10) The patient can feed self from a tray or table when someone puts the food within reach. The patient must put on an assistive device if needed, cut food, and if desired use salt and pepper, spread butter, etc.    Overall MBI score of 52. A score between 21-60 indicates Severe Dependence       Education Documentation  Body Mechanics, taught by Jody Jay OT at 5/19/2025  3:47 PM.  Learner: Patient  Readiness: Acceptance  Method: Explanation  Response: Verbalizes Understanding, Demonstrated Understanding    ADL Training, taught by Jody Jay OT at 5/19/2025  3:47 PM.  Learner: Patient  Readiness: Acceptance  Method: Explanation  Response: Verbalizes Understanding, Demonstrated Understanding    Education Comments  No comments  found.        Goals:  Encounter Problems       Encounter Problems (Active)       ADLs       Patient with complete upper body dressing with contact guard assist level of assistance donning and doffing all UE clothes with PRN adaptive equipment while edge of bed  (Progressing)       Start:  05/16/25    Expected End:  05/30/25            Patient with complete lower body dressing with minimal assist  level of assistance donning and doffing all LE clothes  with PRN adaptive equipment while edge of bed  (Progressing)       Start:  05/16/25    Expected End:  05/30/25            Patient will feed self with set-up level of assistance using PRN adaptive equipment.       Start:  05/16/25    Expected End:  05/30/25            Patient will complete daily grooming tasks brushing teeth and washing face/hair with set-up level of assistance and PRN adaptive equipment while edge of bed .       Start:  05/16/25    Expected End:  05/30/25            Patient will complete toileting including hygiene clothing management/hygiene with minimal assist  level of assistance and raised toilet seat.       Start:  05/16/25    Expected End:  05/30/25               COGNITION/SAFETY       Patient will score WFL on standardized cognitive assessment with PRN cues and within reasonable time frame       Start:  05/16/25    Expected End:  05/30/25               MOBILITY       Patient will perform Functional mobility mod  Household distances/Community Distances with CGA level of assistance and least restrictive device in order to improve safety and functional mobility. (Progressing)       Start:  05/16/25    Expected End:  05/30/25               TRANSFERS       Patient will perform bed mobility contact guard assist level of assistance and bed rails in order to improve safety and independence with mobility (Progressing)       Start:  05/16/25    Expected End:  05/30/25            Patient will complete functional transfer to chair/toilet with least  restrictive device with minimal assist  level of assistance. (Progressing)       Start:  05/16/25    Expected End:  05/30/25 05/19/25 at 3:48 PM   RODRIGUE DELUCA, OT   [phone]

## 2025-05-19 NOTE — CARE PLAN
The patient's goals for the shift include  pt will be free from falls      The clinical goals for the shift include Patient will be free from injury

## 2025-05-19 NOTE — DISCHARGE SUMMARY
Discharge Diagnosis  NPH (normal pressure hydrocephalus) (Multi)    Issues Requiring Follow-Up  Standard post op care    Test Results Pending At Discharge  Pending Labs       No current pending labs.            Hospital Course  Nahid Gastelum is a 75 y.o. male with a past medical history of HTN, HLD, Afib, MVR (on Warfarin), TIA, DM, BCC, urinary frequency/BPH, dementia, NPH. He was seen in clinic and for VPS planning.   5/15 s/p R occipital VPS (Certas at 5), CTH cath in position. He was then admitted to neurosurgery service post operatively.   5/16 noted to have change in mental status, CT stable    PT/OT evaluated patient and recommended SNF at time of discharge     On the day of discharge, the patient was seen and evaluated by the neurosurgery team and deemed suitable for discharge. The patient was given detailed discharge instructions and were scheduled to follow up as an outpatient.      Pertinent Physical Exam At Time of Discharge  Physical Exam  HENT:      Head: Normocephalic.      Comments: Incision C/D/I  Eyes:      Pupils: Pupils are equal, round, and reactive to light.   Cardiovascular:      Pulses: Normal pulses.   Pulmonary:      Effort: Pulmonary effort is normal.   Abdominal:      Palpations: Abdomen is soft.      Comments: Incision C/D/I   Musculoskeletal:         General: Normal range of motion.   Skin:     General: Skin is warm.   Neurological:      Mental Status: He is alert and oriented to person, place, and time.      Comments: BUE 5/5  BLE 5/5         Home Medications     Medication List      START taking these medications     acetaminophen 325 mg tablet; Commonly known as: Tylenol; Take 2 tablets   (650 mg) by mouth every 6 hours if needed for mild pain (1 - 3).   diclofenac sodium 1 % gel; Commonly known as: Voltaren; Apply 4.5 inches   (4 g) topically 4 times a day as needed (knee pain).   oxyCODONE 5 mg immediate release tablet; Commonly known as: Roxicodone;   Take 1 tablet (5 mg) by  mouth every 6 hours if needed for severe pain (7 -   10).   polyethylene glycol 17 gram packet; Commonly known as: Glycolax,   Miralax; Take 17 g by mouth every 12 hours.   sennosides-docusate sodium 8.6-50 mg tablet; Commonly known as:   Charley-Colace; Take 2 tablets by mouth 2 times a day.   warfarin 4 mg tablet; Commonly known as: Coumadin     CHANGE how you take these medications     spironolactone 25 mg tablet; Commonly known as: Aldactone; Take 1 tablet   (25 mg) by mouth once daily.; What changed: See the new instructions.     CONTINUE taking these medications     ascorbic acid 250 mg tablet; Commonly known as: Vitamin C   atorvastatin 80 mg tablet; Commonly known as: Lipitor   b complex 0.4 mg tablet   calcium citrate 950 mg (200 mg elemental) tablet; Commonly known as:   Calcitrate   donepezil 10 mg tablet; Commonly known as: Aricept   ezetimibe 10 mg tablet; Commonly known as: Zetia   famotidine 40 mg tablet; Commonly known as: Pepcid   ferrous sulfate 325 (65 Fe) mg EC tablet   Flonase Allergy Relief 50 mcg/actuation nasal spray; Generic drug:   fluticasone   magnesium oxide 400 mg magnesium capsule   metoprolol tartrate 25 mg tablet; Commonly known as: Lopressor   minocycline 100 mg capsule; Take 1 capsule (100 mg) by mouth once daily.   tamsulosin 0.4 mg 24 hr capsule; Commonly known as: Flomax; Take 1   capsule (0.4 mg) by mouth 2 times a day.     STOP taking these medications     chlorhexidine 0.12 % solution; Commonly known as: Peridex   chlorhexidine 4 % external liquid; Commonly known as: Hibiclens   HYDROcodone-acetaminophen 5-325 mg tablet; Commonly known as: Norco   Lovenox 60 mg/0.6 mL syringe; Generic drug: enoxaparin   traMADol 50 mg tablet; Commonly known as: Ultram       Outpatient Follow-Up  Future Appointments   Date Time Provider Department Center   6/4/2025 10:15 AM NEUROSURGERY [facility] NURSE [id number]   7/2/2025 12:30 PM [id number]  MILO Champagne-CNP  Total face to face time spent with patient/family of 30 minutes, with >50% of the time spent discussing plan of care/management, counseling/educating on disease processes, explaining results of diagnostic testing.

## 2025-05-19 NOTE — PROGRESS NOTES
Pending precert for [facility]. LSW updated the communication board for PT/OT to see patient. Care Transitions will continue to follow.    1118-LSW updated patient and bedside and wife Alicja via phone that precert is pending for discharge to SNF. Patient and wife verbalized understanding. Wife is anxious for discharge. Precert to be escalated once PT note is available.     1137-Updated PT note sent to SNF. LSW escalated precert. Pending precert. Care Transitions will continue to follow.     1347-Auth received. [facility] confirmed 1700 stretcher transport for discharge to [facility]. Report # [phone]. LSW notified team, wife, SNF, and the floor. [place] complete and sent to SNF. 7000 available in [facility]ENS.     SAILAJA Colby

## 2025-05-21 ENCOUNTER — APPOINTMENT (OUTPATIENT)
Dept: NEUROSURGERY | Facility: HOSPITAL | Age: 75
End: 2025-05-21
Payer: MEDICARE

## 2025-06-03 ENCOUNTER — TELEPHONE (OUTPATIENT)
Dept: NEUROSURGERY | Facility: HOSPITAL | Age: 75
End: 2025-06-03
Payer: MEDICARE

## 2025-06-03 NOTE — TELEPHONE ENCOUNTER
Talked to his wife and she said he is doing better. His  Shunt incisions are with out redness, swelling or drainage. I discussed incision care and activity. He will see Dr. Melton on 7/2.

## 2025-06-04 ENCOUNTER — TELEPHONE (OUTPATIENT)
Dept: NEUROSURGERY | Facility: HOSPITAL | Age: 75
End: 2025-06-04
Payer: MEDICARE

## 2025-06-04 ENCOUNTER — APPOINTMENT (OUTPATIENT)
Dept: NEUROSURGERY | Facility: HOSPITAL | Age: 75
End: 2025-06-04
Payer: MEDICARE

## 2025-06-09 ENCOUNTER — APPOINTMENT (OUTPATIENT)
Dept: RADIOLOGY | Facility: HOSPITAL | Age: 75
End: 2025-06-09
Payer: MEDICARE

## 2025-06-09 ENCOUNTER — HOSPITAL ENCOUNTER (EMERGENCY)
Facility: HOSPITAL | Age: 75
Discharge: ANOTHER HOSPITAL | End: 2025-06-09
Attending: STUDENT IN AN ORGANIZED HEALTH CARE EDUCATION/TRAINING PROGRAM
Payer: MEDICARE

## 2025-06-09 ENCOUNTER — HOSPITAL ENCOUNTER (INPATIENT)
Facility: HOSPITAL | Age: 75
LOS: 6 days | Discharge: NURSING, REHAB OR LONG-TERM CARE | DRG: 917 | End: 2025-06-15
Attending: EMERGENCY MEDICINE | Admitting: NURSE PRACTITIONER
Payer: MEDICARE

## 2025-06-09 ENCOUNTER — APPOINTMENT (OUTPATIENT)
Dept: CARDIOLOGY | Facility: HOSPITAL | Age: 75
End: 2025-06-09
Payer: MEDICARE

## 2025-06-09 ENCOUNTER — APPOINTMENT (OUTPATIENT)
Dept: RADIOLOGY | Facility: HOSPITAL | Age: 75
DRG: 917 | End: 2025-06-09
Payer: MEDICARE

## 2025-06-09 ENCOUNTER — CLINICAL SUPPORT (OUTPATIENT)
Dept: EMERGENCY MEDICINE | Facility: HOSPITAL | Age: 75
DRG: 917 | End: 2025-06-09
Payer: MEDICARE

## 2025-06-09 VITALS
RESPIRATION RATE: 16 BRPM | SYSTOLIC BLOOD PRESSURE: 164 MMHG | OXYGEN SATURATION: 100 % | HEIGHT: 67 IN | DIASTOLIC BLOOD PRESSURE: 83 MMHG | WEIGHT: 138 LBS | TEMPERATURE: 97.3 F | HEART RATE: 102 BPM | BODY MASS INDEX: 21.66 KG/M2

## 2025-06-09 DIAGNOSIS — Z51.81 ANTICOAGULATION GOAL OF INR 2 TO 3: ICD-10-CM

## 2025-06-09 DIAGNOSIS — R79.1 SUPRATHERAPEUTIC INR: ICD-10-CM

## 2025-06-09 DIAGNOSIS — G89.29 OTHER CHRONIC PAIN: ICD-10-CM

## 2025-06-09 DIAGNOSIS — G91.2 NPH (NORMAL PRESSURE HYDROCEPHALUS) (MULTI): ICD-10-CM

## 2025-06-09 DIAGNOSIS — Z79.899 POLYPHARMACY: ICD-10-CM

## 2025-06-09 DIAGNOSIS — E11.9 TYPE 2 DIABETES MELLITUS WITHOUT COMPLICATION, WITHOUT LONG-TERM CURRENT USE OF INSULIN: ICD-10-CM

## 2025-06-09 DIAGNOSIS — T50.901A ACCIDENTAL OVERDOSE, INITIAL ENCOUNTER: Primary | ICD-10-CM

## 2025-06-09 DIAGNOSIS — R41.82 ALTERED MENTAL STATUS: Primary | ICD-10-CM

## 2025-06-09 DIAGNOSIS — R26.9 GAIT DISTURBANCE: ICD-10-CM

## 2025-06-09 DIAGNOSIS — I15.9 SECONDARY HYPERTENSION: ICD-10-CM

## 2025-06-09 DIAGNOSIS — Z79.01 ANTICOAGULATION GOAL OF INR 2 TO 3: ICD-10-CM

## 2025-06-09 PROBLEM — M47.817 LUMBOSACRAL SPONDYLOSIS: Status: ACTIVE | Noted: 2025-06-09

## 2025-06-09 PROBLEM — I10 BENIGN ESSENTIAL HYPERTENSION: Status: ACTIVE | Noted: 2025-06-09

## 2025-06-09 PROBLEM — Z86.73 OLD CEREBELLAR INFARCT WITHOUT LATE EFFECT: Status: ACTIVE | Noted: 2025-06-09

## 2025-06-09 PROBLEM — N13.8 BPH WITH URINARY OBSTRUCTION: Status: ACTIVE | Noted: 2025-04-01

## 2025-06-09 PROBLEM — D63.8 ANEMIA OF CHRONIC DISEASE: Status: ACTIVE | Noted: 2025-04-01

## 2025-06-09 PROBLEM — E78.00 FAMILIAL HYPERLIPOPROTEINEMIA TYPE IIA: Status: ACTIVE | Noted: 2025-06-09

## 2025-06-09 PROBLEM — E78.5 HYPERLIPIDEMIA: Status: ACTIVE | Noted: 2025-04-01

## 2025-06-09 PROBLEM — D50.9 ANEMIA, IRON DEFICIENCY: Status: ACTIVE | Noted: 2025-04-01

## 2025-06-09 PROBLEM — L89.159 PRESSURE INJURY OF SKIN OF SACRAL REGION: Status: ACTIVE | Noted: 2025-05-19

## 2025-06-09 PROBLEM — I31.39 NONINFLAMMATORY PERICARDIAL EFFUSION (HHS-HCC): Status: ACTIVE | Noted: 2025-05-19

## 2025-06-09 PROBLEM — M17.0 OSTEOARTHRITIS OF BOTH KNEES: Status: ACTIVE | Noted: 2025-06-09

## 2025-06-09 PROBLEM — I87.2 PERIPHERAL VENOUS INSUFFICIENCY: Status: ACTIVE | Noted: 2025-06-09

## 2025-06-09 PROBLEM — I34.0 MITRAL VALVE REGURGITATION: Status: ACTIVE | Noted: 2025-05-19

## 2025-06-09 PROBLEM — E11.42 TYPE 2 DIABETES MELLITUS WITH POLYNEUROPATHY: Status: ACTIVE | Noted: 2017-12-18

## 2025-06-09 PROBLEM — M48.02 CERVICAL SPINAL STENOSIS: Status: ACTIVE | Noted: 2025-06-09

## 2025-06-09 PROBLEM — R39.15 URINARY URGENCY: Status: RESOLVED | Noted: 2025-04-25 | Resolved: 2025-06-09

## 2025-06-09 PROBLEM — E55.9 VITAMIN D DEFICIENCY: Status: ACTIVE | Noted: 2025-04-01

## 2025-06-09 PROBLEM — M50.30 DDD (DEGENERATIVE DISC DISEASE), CERVICAL: Status: ACTIVE | Noted: 2025-06-09

## 2025-06-09 PROBLEM — I67.9 CEREBROVASCULAR DISEASE: Status: ACTIVE | Noted: 2025-05-19

## 2025-06-09 PROBLEM — M54.17 LUMBOSACRAL NEURITIS: Status: ACTIVE | Noted: 2025-06-09

## 2025-06-09 PROBLEM — N40.1 BPH WITH URINARY OBSTRUCTION: Status: ACTIVE | Noted: 2025-04-01

## 2025-06-09 PROBLEM — M48.061 SPINAL STENOSIS, LUMBAR: Status: ACTIVE | Noted: 2025-06-09

## 2025-06-09 PROBLEM — F33.9 RECURRENT MAJOR DEPRESSION: Status: ACTIVE | Noted: 2025-05-19

## 2025-06-09 PROBLEM — F41.9 ANXIETY DISORDER: Status: ACTIVE | Noted: 2025-05-19

## 2025-06-09 PROBLEM — I99.8 VASCULAR INSUFFICIENCY: Status: ACTIVE | Noted: 2025-04-01

## 2025-06-09 PROBLEM — B02.29 POST HERPETIC NEURALGIA: Status: ACTIVE | Noted: 2025-06-09

## 2025-06-09 PROBLEM — M96.1 LUMBAR POSTLAMINECTOMY SYNDROME: Status: ACTIVE | Noted: 2025-04-01

## 2025-06-09 PROBLEM — R35.0 URINARY FREQUENCY: Status: RESOLVED | Noted: 2025-04-25 | Resolved: 2025-06-09

## 2025-06-09 LAB
ABO GROUP (TYPE) IN BLOOD: NORMAL
ALBUMIN SERPL BCP-MCNC: 3.6 G/DL (ref 3.4–5)
ALBUMIN SERPL BCP-MCNC: 3.8 G/DL (ref 3.4–5)
ALP SERPL-CCNC: 76 U/L (ref 33–136)
ALP SERPL-CCNC: 79 U/L (ref 33–136)
ALT SERPL W P-5'-P-CCNC: 40 U/L (ref 10–52)
ALT SERPL W P-5'-P-CCNC: 46 U/L (ref 10–52)
ANION GAP BLDV CALCULATED.4IONS-SCNC: 10 MMOL/L (ref 10–25)
ANION GAP BLDV CALCULATED.4IONS-SCNC: 8 MMOL/L (ref 10–25)
ANION GAP SERPL CALC-SCNC: 11 MMOL/L (ref 10–20)
ANION GAP SERPL CALC-SCNC: 13 MMOL/L (ref 10–20)
ANTIBODY SCREEN: NORMAL
APAP SERPL-MCNC: <10 UG/ML (ref ?–30)
AST SERPL W P-5'-P-CCNC: 20 U/L (ref 9–39)
AST SERPL W P-5'-P-CCNC: 26 U/L (ref 9–39)
BASE EXCESS BLDV CALC-SCNC: 3.3 MMOL/L (ref -2–3)
BASE EXCESS BLDV CALC-SCNC: 3.3 MMOL/L (ref -2–3)
BASOPHILS # BLD AUTO: 0.04 X10*3/UL (ref 0–0.1)
BASOPHILS # BLD AUTO: 0.04 X10*3/UL (ref 0–0.1)
BASOPHILS NFR BLD AUTO: 0.4 %
BASOPHILS NFR BLD AUTO: 0.4 %
BILIRUB SERPL-MCNC: 0.6 MG/DL (ref 0–1.2)
BILIRUB SERPL-MCNC: 0.6 MG/DL (ref 0–1.2)
BODY TEMPERATURE: 37 DEGREES CELSIUS
BODY TEMPERATURE: ABNORMAL
BUN SERPL-MCNC: 16 MG/DL (ref 6–23)
BUN SERPL-MCNC: 19 MG/DL (ref 6–23)
CA-I BLDV-SCNC: 1.21 MMOL/L (ref 1.1–1.33)
CA-I BLDV-SCNC: 1.27 MMOL/L (ref 1.1–1.33)
CALCIUM SERPL-MCNC: 9.1 MG/DL (ref 8.6–10.3)
CALCIUM SERPL-MCNC: 9.1 MG/DL (ref 8.6–10.6)
CARDIAC TROPONIN I PNL SERPL HS: 16 NG/L (ref 0–20)
CHLORIDE BLDV-SCNC: 103 MMOL/L (ref 98–107)
CHLORIDE BLDV-SCNC: 105 MMOL/L (ref 98–107)
CHLORIDE SERPL-SCNC: 103 MMOL/L (ref 98–107)
CHLORIDE SERPL-SCNC: 105 MMOL/L (ref 98–107)
CO2 SERPL-SCNC: 29 MMOL/L (ref 21–32)
CO2 SERPL-SCNC: 30 MMOL/L (ref 21–32)
CREAT SERPL-MCNC: 0.62 MG/DL (ref 0.5–1.3)
CREAT SERPL-MCNC: 0.65 MG/DL (ref 0.5–1.3)
EGFRCR SERPLBLD CKD-EPI 2021: >90 ML/MIN/1.73M*2
EGFRCR SERPLBLD CKD-EPI 2021: >90 ML/MIN/1.73M*2
EOSINOPHIL # BLD AUTO: 0.12 X10*3/UL (ref 0–0.4)
EOSINOPHIL # BLD AUTO: 0.17 X10*3/UL (ref 0–0.4)
EOSINOPHIL NFR BLD AUTO: 1.3 %
EOSINOPHIL NFR BLD AUTO: 1.6 %
ERYTHROCYTE [DISTWIDTH] IN BLOOD BY AUTOMATED COUNT: 13 % (ref 11.5–14.5)
ERYTHROCYTE [DISTWIDTH] IN BLOOD BY AUTOMATED COUNT: 13 % (ref 11.5–14.5)
ETHANOL SERPL-MCNC: <10 MG/DL
GLUCOSE BLD MANUAL STRIP-MCNC: 108 MG/DL (ref 74–99)
GLUCOSE BLDV-MCNC: 123 MG/DL (ref 74–99)
GLUCOSE BLDV-MCNC: 130 MG/DL (ref 74–99)
GLUCOSE SERPL-MCNC: 127 MG/DL (ref 74–99)
GLUCOSE SERPL-MCNC: 130 MG/DL (ref 74–99)
HCO3 BLDV-SCNC: 29.1 MMOL/L (ref 22–26)
HCO3 BLDV-SCNC: 29.9 MMOL/L (ref 22–26)
HCT VFR BLD AUTO: 39.5 % (ref 41–52)
HCT VFR BLD AUTO: 43.1 % (ref 41–52)
HCT VFR BLD EST: 38 % (ref 41–52)
HCT VFR BLD EST: 39 % (ref 41–52)
HGB BLD-MCNC: 12.9 G/DL (ref 13.5–17.5)
HGB BLD-MCNC: 13.6 G/DL (ref 13.5–17.5)
HGB BLDV-MCNC: 12.6 G/DL (ref 13.5–17.5)
HGB BLDV-MCNC: 12.9 G/DL (ref 13.5–17.5)
IMM GRANULOCYTES # BLD AUTO: 0.05 X10*3/UL (ref 0–0.5)
IMM GRANULOCYTES # BLD AUTO: 0.06 X10*3/UL (ref 0–0.5)
IMM GRANULOCYTES NFR BLD AUTO: 0.5 % (ref 0–0.9)
IMM GRANULOCYTES NFR BLD AUTO: 0.6 % (ref 0–0.9)
INHALED O2 CONCENTRATION: 28 %
INHALED O2 CONCENTRATION: 36 %
INR PPP: 4.5 (ref 0.9–1.1)
INR PPP: 4.6 (ref 0.9–1.1)
LACTATE BLDV-SCNC: 0.9 MMOL/L (ref 0.4–2)
LACTATE BLDV-SCNC: 0.9 MMOL/L (ref 0.4–2)
LYMPHOCYTES # BLD AUTO: 0.71 X10*3/UL (ref 0.8–3)
LYMPHOCYTES # BLD AUTO: 0.73 X10*3/UL (ref 0.8–3)
LYMPHOCYTES NFR BLD AUTO: 6.6 %
LYMPHOCYTES NFR BLD AUTO: 7.7 %
MCH RBC QN AUTO: 30.9 PG (ref 26–34)
MCH RBC QN AUTO: 31.1 PG (ref 26–34)
MCHC RBC AUTO-ENTMCNC: 31.6 G/DL (ref 32–36)
MCHC RBC AUTO-ENTMCNC: 32.7 G/DL (ref 32–36)
MCV RBC AUTO: 95 FL (ref 80–100)
MCV RBC AUTO: 98 FL (ref 80–100)
MONOCYTES # BLD AUTO: 0.68 X10*3/UL (ref 0.05–0.8)
MONOCYTES # BLD AUTO: 0.8 X10*3/UL (ref 0.05–0.8)
MONOCYTES NFR BLD AUTO: 7.2 %
MONOCYTES NFR BLD AUTO: 7.4 %
NEUTROPHILS # BLD AUTO: 7.87 X10*3/UL (ref 1.6–5.5)
NEUTROPHILS # BLD AUTO: 9.04 X10*3/UL (ref 1.6–5.5)
NEUTROPHILS NFR BLD AUTO: 82.9 %
NEUTROPHILS NFR BLD AUTO: 83.4 %
NRBC BLD-RTO: 0 /100 WBCS (ref 0–0)
NRBC BLD-RTO: 0 /100 WBCS (ref 0–0)
OXYHGB MFR BLDV: 42.9 % (ref 45–75)
OXYHGB MFR BLDV: 51.7 % (ref 45–75)
PCO2 BLDV: 48 MM HG (ref 41–51)
PCO2 BLDV: 53 MM HG (ref 41–51)
PH BLDV: 7.36 PH (ref 7.33–7.43)
PH BLDV: 7.39 PH (ref 7.33–7.43)
PLATELET # BLD AUTO: 195 X10*3/UL (ref 150–450)
PLATELET # BLD AUTO: 206 X10*3/UL (ref 150–450)
PO2 BLDV: 28 MM HG (ref 35–45)
PO2 BLDV: 35 MM HG (ref 35–45)
POTASSIUM BLDV-SCNC: 3.8 MMOL/L (ref 3.5–5.3)
POTASSIUM BLDV-SCNC: 4.1 MMOL/L (ref 3.5–5.3)
POTASSIUM SERPL-SCNC: 4 MMOL/L (ref 3.5–5.3)
POTASSIUM SERPL-SCNC: 4.2 MMOL/L (ref 3.5–5.3)
PROT SERPL-MCNC: 6.3 G/DL (ref 6.4–8.2)
PROT SERPL-MCNC: 6.6 G/DL (ref 6.4–8.2)
PROTHROMBIN TIME: 50.3 SECONDS (ref 9.8–12.4)
PROTHROMBIN TIME: 51.1 SECONDS (ref 9.8–12.4)
Q ONSET: 220 MS
QRS COUNT: 14 BEATS
QRS DURATION: 82 MS
QT INTERVAL: 344 MS
QTC CALCULATION(BAZETT): 420 MS
QTC FREDERICIA: 393 MS
R AXIS: 15 DEGREES
RBC # BLD AUTO: 4.17 X10*6/UL (ref 4.5–5.9)
RBC # BLD AUTO: 4.38 X10*6/UL (ref 4.5–5.9)
RH FACTOR (ANTIGEN D): NORMAL
SALICYLATES SERPL-MCNC: <3 MG/DL (ref ?–20)
SAO2 % BLDV: 44 % (ref 45–75)
SAO2 % BLDV: 53 % (ref 45–75)
SODIUM BLDV-SCNC: 138 MMOL/L (ref 136–145)
SODIUM BLDV-SCNC: 139 MMOL/L (ref 136–145)
SODIUM SERPL-SCNC: 140 MMOL/L (ref 136–145)
SODIUM SERPL-SCNC: 143 MMOL/L (ref 136–145)
T AXIS: 32 DEGREES
T OFFSET: 392 MS
VENTRICULAR RATE: 90 BPM
WBC # BLD AUTO: 10.8 X10*3/UL (ref 4.4–11.3)
WBC # BLD AUTO: 9.5 X10*3/UL (ref 4.4–11.3)

## 2025-06-09 PROCEDURE — 99285 EMERGENCY DEPT VISIT HI MDM: CPT | Mod: 25 | Performed by: EMERGENCY MEDICINE

## 2025-06-09 PROCEDURE — 93005 ELECTROCARDIOGRAM TRACING: CPT

## 2025-06-09 PROCEDURE — 84132 ASSAY OF SERUM POTASSIUM: CPT

## 2025-06-09 PROCEDURE — 71045 X-RAY EXAM CHEST 1 VIEW: CPT

## 2025-06-09 PROCEDURE — 74018 RADEX ABDOMEN 1 VIEW: CPT | Mod: FOREIGN READ | Performed by: RADIOLOGY

## 2025-06-09 PROCEDURE — 96374 THER/PROPH/DIAG INJ IV PUSH: CPT

## 2025-06-09 PROCEDURE — 70250 X-RAY EXAM OF SKULL: CPT | Performed by: STUDENT IN AN ORGANIZED HEALTH CARE EDUCATION/TRAINING PROGRAM

## 2025-06-09 PROCEDURE — 85025 COMPLETE CBC W/AUTO DIFF WBC: CPT | Performed by: EMERGENCY MEDICINE

## 2025-06-09 PROCEDURE — 86901 BLOOD TYPING SEROLOGIC RH(D): CPT | Performed by: EMERGENCY MEDICINE

## 2025-06-09 PROCEDURE — 93010 ELECTROCARDIOGRAM REPORT: CPT | Performed by: EMERGENCY MEDICINE

## 2025-06-09 PROCEDURE — 70250 X-RAY EXAM OF SKULL: CPT | Mod: FOREIGN READ | Performed by: RADIOLOGY

## 2025-06-09 PROCEDURE — 70250 X-RAY EXAM OF SKULL: CPT

## 2025-06-09 PROCEDURE — 36415 COLL VENOUS BLD VENIPUNCTURE: CPT

## 2025-06-09 PROCEDURE — 82947 ASSAY GLUCOSE BLOOD QUANT: CPT

## 2025-06-09 PROCEDURE — 99285 EMERGENCY DEPT VISIT HI MDM: CPT | Mod: 25 | Performed by: STUDENT IN AN ORGANIZED HEALTH CARE EDUCATION/TRAINING PROGRAM

## 2025-06-09 PROCEDURE — 70450 CT HEAD/BRAIN W/O DYE: CPT

## 2025-06-09 PROCEDURE — 85610 PROTHROMBIN TIME: CPT | Performed by: EMERGENCY MEDICINE

## 2025-06-09 PROCEDURE — 80053 COMPREHEN METABOLIC PANEL: CPT

## 2025-06-09 PROCEDURE — 99285 EMERGENCY DEPT VISIT HI MDM: CPT | Performed by: EMERGENCY MEDICINE

## 2025-06-09 PROCEDURE — 2500000004 HC RX 250 GENERAL PHARMACY W/ HCPCS (ALT 636 FOR OP/ED)

## 2025-06-09 PROCEDURE — 1200000002 HC GENERAL ROOM WITH TELEMETRY DAILY

## 2025-06-09 PROCEDURE — 85025 COMPLETE CBC W/AUTO DIFF WBC: CPT

## 2025-06-09 PROCEDURE — 71045 X-RAY EXAM CHEST 1 VIEW: CPT | Mod: FOREIGN READ | Performed by: RADIOLOGY

## 2025-06-09 PROCEDURE — 71045 X-RAY EXAM CHEST 1 VIEW: CPT | Performed by: RADIOLOGY

## 2025-06-09 PROCEDURE — 2500000001 HC RX 250 WO HCPCS SELF ADMINISTERED DRUGS (ALT 637 FOR MEDICARE OP): Performed by: NURSE PRACTITIONER

## 2025-06-09 PROCEDURE — 99223 1ST HOSP IP/OBS HIGH 75: CPT | Performed by: NURSE PRACTITIONER

## 2025-06-09 PROCEDURE — 80320 DRUG SCREEN QUANTALCOHOLS: CPT | Performed by: EMERGENCY MEDICINE

## 2025-06-09 PROCEDURE — 85610 PROTHROMBIN TIME: CPT

## 2025-06-09 PROCEDURE — 82435 ASSAY OF BLOOD CHLORIDE: CPT | Performed by: EMERGENCY MEDICINE

## 2025-06-09 PROCEDURE — 84484 ASSAY OF TROPONIN QUANT: CPT

## 2025-06-09 PROCEDURE — 70450 CT HEAD/BRAIN W/O DYE: CPT | Performed by: RADIOLOGY

## 2025-06-09 PROCEDURE — 2500000002 HC RX 250 W HCPCS SELF ADMINISTERED DRUGS (ALT 637 FOR MEDICARE OP, ALT 636 FOR OP/ED): Performed by: NURSE PRACTITIONER

## 2025-06-09 RX ORDER — WARFARIN 4 MG/1
TABLET ORAL
COMMUNITY
Start: 2025-06-02

## 2025-06-09 RX ORDER — FERROUS SULFATE 325(65) MG
65 TABLET ORAL DAILY
Status: DISCONTINUED | OUTPATIENT
Start: 2025-06-10 | End: 2025-06-15 | Stop reason: HOSPADM

## 2025-06-09 RX ORDER — DEXTROSE 50 % IN WATER (D50W) INTRAVENOUS SYRINGE
25
Status: DISCONTINUED | OUTPATIENT
Start: 2025-06-09 | End: 2025-06-15 | Stop reason: HOSPADM

## 2025-06-09 RX ORDER — DONEPEZIL HYDROCHLORIDE 10 MG/1
10 TABLET, FILM COATED ORAL NIGHTLY
Status: DISCONTINUED | OUTPATIENT
Start: 2025-06-09 | End: 2025-06-15 | Stop reason: HOSPADM

## 2025-06-09 RX ORDER — WARFARIN SODIUM 5 MG/1
2.5 TABLET ORAL
Status: DISCONTINUED | OUTPATIENT
Start: 2025-06-11 | End: 2025-06-15 | Stop reason: HOSPADM

## 2025-06-09 RX ORDER — NALOXONE HYDROCHLORIDE 0.4 MG/ML
0.4 INJECTION, SOLUTION INTRAMUSCULAR; INTRAVENOUS; SUBCUTANEOUS ONCE
Status: COMPLETED | OUTPATIENT
Start: 2025-06-09 | End: 2025-06-09

## 2025-06-09 RX ORDER — METOPROLOL TARTRATE 25 MG/1
12.5 TABLET, FILM COATED ORAL 2 TIMES DAILY
Status: DISCONTINUED | OUTPATIENT
Start: 2025-06-09 | End: 2025-06-15 | Stop reason: HOSPADM

## 2025-06-09 RX ORDER — INSULIN LISPRO 100 [IU]/ML
0-10 INJECTION, SOLUTION INTRAVENOUS; SUBCUTANEOUS
Status: DISCONTINUED | OUTPATIENT
Start: 2025-06-09 | End: 2025-06-15

## 2025-06-09 RX ORDER — TAMSULOSIN HYDROCHLORIDE 0.4 MG/1
0.4 CAPSULE ORAL 2 TIMES DAILY
Status: DISCONTINUED | OUTPATIENT
Start: 2025-06-09 | End: 2025-06-15 | Stop reason: HOSPADM

## 2025-06-09 RX ORDER — SPIRONOLACTONE 25 MG/1
25 TABLET ORAL DAILY
Status: DISCONTINUED | OUTPATIENT
Start: 2025-06-09 | End: 2025-06-15 | Stop reason: HOSPADM

## 2025-06-09 RX ORDER — WARFARIN 3 MG/1
3 TABLET ORAL
Status: DISCONTINUED | OUTPATIENT
Start: 2025-06-10 | End: 2025-06-15 | Stop reason: HOSPADM

## 2025-06-09 RX ORDER — FAMOTIDINE 20 MG/1
40 TABLET, FILM COATED ORAL NIGHTLY
Status: DISCONTINUED | OUTPATIENT
Start: 2025-06-09 | End: 2025-06-15 | Stop reason: HOSPADM

## 2025-06-09 RX ORDER — LISINOPRIL 10 MG/1
10 TABLET ORAL DAILY
COMMUNITY
Start: 2025-05-22 | End: 2025-06-11 | Stop reason: WASHOUT

## 2025-06-09 RX ORDER — EZETIMIBE 10 MG/1
10 TABLET ORAL DAILY
Status: DISCONTINUED | OUTPATIENT
Start: 2025-06-10 | End: 2025-06-15 | Stop reason: HOSPADM

## 2025-06-09 RX ORDER — DICLOFENAC SODIUM 10 MG/G
4 GEL TOPICAL 4 TIMES DAILY PRN
Status: DISCONTINUED | OUTPATIENT
Start: 2025-06-09 | End: 2025-06-15 | Stop reason: HOSPADM

## 2025-06-09 RX ORDER — IBUPROFEN 200 MG
200 CAPSULE ORAL DAILY
Status: DISCONTINUED | OUTPATIENT
Start: 2025-06-10 | End: 2025-06-15 | Stop reason: HOSPADM

## 2025-06-09 RX ORDER — ATORVASTATIN CALCIUM 80 MG/1
80 TABLET, FILM COATED ORAL NIGHTLY
Status: DISCONTINUED | OUTPATIENT
Start: 2025-06-09 | End: 2025-06-15 | Stop reason: HOSPADM

## 2025-06-09 RX ORDER — MINOCYCLINE HYDROCHLORIDE 100 MG/1
100 CAPSULE ORAL DAILY
Status: DISCONTINUED | OUTPATIENT
Start: 2025-06-10 | End: 2025-06-15 | Stop reason: HOSPADM

## 2025-06-09 RX ORDER — ASCORBIC ACID 500 MG
250 TABLET ORAL DAILY
Status: DISCONTINUED | OUTPATIENT
Start: 2025-06-10 | End: 2025-06-15 | Stop reason: HOSPADM

## 2025-06-09 RX ORDER — FLUTICASONE PROPIONATE 50 MCG
1 SPRAY, SUSPENSION (ML) NASAL DAILY
Status: DISCONTINUED | OUTPATIENT
Start: 2025-06-10 | End: 2025-06-15 | Stop reason: HOSPADM

## 2025-06-09 RX ORDER — METFORMIN HYDROCHLORIDE 500 MG/1
500 TABLET ORAL
Status: DISCONTINUED | OUTPATIENT
Start: 2025-06-10 | End: 2025-06-15 | Stop reason: HOSPADM

## 2025-06-09 RX ORDER — WARFARIN 3 MG/1
TABLET ORAL
COMMUNITY
Start: 2025-06-02 | End: 2025-06-11 | Stop reason: WASHOUT

## 2025-06-09 RX ORDER — LANOLIN ALCOHOL/MO/W.PET/CERES
400 CREAM (GRAM) TOPICAL DAILY
Status: DISCONTINUED | OUTPATIENT
Start: 2025-06-10 | End: 2025-06-15 | Stop reason: HOSPADM

## 2025-06-09 RX ORDER — DEXTROSE 50 % IN WATER (D50W) INTRAVENOUS SYRINGE
12.5
Status: DISCONTINUED | OUTPATIENT
Start: 2025-06-09 | End: 2025-06-15 | Stop reason: HOSPADM

## 2025-06-09 RX ORDER — LISINOPRIL 10 MG/1
10 TABLET ORAL DAILY
Status: DISCONTINUED | OUTPATIENT
Start: 2025-06-09 | End: 2025-06-15 | Stop reason: HOSPADM

## 2025-06-09 RX ORDER — POLYETHYLENE GLYCOL 3350 17 G/17G
17 POWDER, FOR SOLUTION ORAL EVERY 12 HOURS
Status: DISCONTINUED | OUTPATIENT
Start: 2025-06-09 | End: 2025-06-15 | Stop reason: HOSPADM

## 2025-06-09 RX ORDER — METFORMIN HYDROCHLORIDE 500 MG/1
500 TABLET ORAL
COMMUNITY
Start: 2025-05-22 | End: 2025-06-11 | Stop reason: WASHOUT

## 2025-06-09 RX ADMIN — DONEPEZIL HYDROCHLORIDE 10 MG: 10 TABLET, FILM COATED ORAL at 20:47

## 2025-06-09 RX ADMIN — SPIRONOLACTONE 25 MG: 25 TABLET, FILM COATED ORAL at 20:47

## 2025-06-09 RX ADMIN — NALOXONE HYDROCHLORIDE 0.4 MG: 0.4 INJECTION, SOLUTION INTRAMUSCULAR; INTRAVENOUS; SUBCUTANEOUS at 15:23

## 2025-06-09 RX ADMIN — ATORVASTATIN CALCIUM 80 MG: 80 TABLET, FILM COATED ORAL at 20:46

## 2025-06-09 RX ADMIN — TAMSULOSIN HYDROCHLORIDE 0.4 MG: 0.4 CAPSULE ORAL at 20:46

## 2025-06-09 RX ADMIN — FAMOTIDINE 40 MG: 20 TABLET, FILM COATED ORAL at 20:49

## 2025-06-09 RX ADMIN — METOPROLOL TARTRATE 12.5 MG: 25 TABLET, FILM COATED ORAL at 20:46

## 2025-06-09 RX ADMIN — LISINOPRIL 10 MG: 10 TABLET ORAL at 20:46

## 2025-06-09 SDOH — HEALTH STABILITY: MENTAL HEALTH: BEHAVIORAL HEALTH(WDL): EXCEPTIONS TO WDL

## 2025-06-09 SDOH — SOCIAL STABILITY: SOCIAL NETWORK: PARENT/GUARDIAN/SIGNIFICANT OTHER INVOLVEMENT: ATTENTIVE TO PATIENT NEEDS

## 2025-06-09 SDOH — SOCIAL STABILITY: SOCIAL INSECURITY: FAMILY BEHAVIORS: SUPPORTIVE

## 2025-06-09 ASSESSMENT — LIFESTYLE VARIABLES
TOTAL SCORE: 0
HAVE YOU EVER FELT YOU SHOULD CUT DOWN ON YOUR DRINKING: NO
EVER FELT BAD OR GUILTY ABOUT YOUR DRINKING: NO
HAVE PEOPLE ANNOYED YOU BY CRITICIZING YOUR DRINKING: NO
EVER HAD A DRINK FIRST THING IN THE MORNING TO STEADY YOUR NERVES TO GET RID OF A HANGOVER: NO

## 2025-06-09 ASSESSMENT — ENCOUNTER SYMPTOMS
MUSCULOSKELETAL NEGATIVE: 1
CONFUSION: 1
EYES NEGATIVE: 1
FATIGUE: 1
NEUROLOGICAL NEGATIVE: 1
CARDIOVASCULAR NEGATIVE: 1
GASTROINTESTINAL NEGATIVE: 1

## 2025-06-09 ASSESSMENT — COLUMBIA-SUICIDE SEVERITY RATING SCALE - C-SSRS
6. HAVE YOU EVER DONE ANYTHING, STARTED TO DO ANYTHING, OR PREPARED TO DO ANYTHING TO END YOUR LIFE?: NO
2. HAVE YOU ACTUALLY HAD ANY THOUGHTS OF KILLING YOURSELF?: NO
2. HAVE YOU ACTUALLY HAD ANY THOUGHTS OF KILLING YOURSELF?: NO
1. IN THE PAST MONTH, HAVE YOU WISHED YOU WERE DEAD OR WISHED YOU COULD GO TO SLEEP AND NOT WAKE UP?: NO
1. IN THE PAST MONTH, HAVE YOU WISHED YOU WERE DEAD OR WISHED YOU COULD GO TO SLEEP AND NOT WAKE UP?: NO
6. HAVE YOU EVER DONE ANYTHING, STARTED TO DO ANYTHING, OR PREPARED TO DO ANYTHING TO END YOUR LIFE?: NO

## 2025-06-09 ASSESSMENT — PAIN - FUNCTIONAL ASSESSMENT
PAIN_FUNCTIONAL_ASSESSMENT: 0-10
PAIN_FUNCTIONAL_ASSESSMENT: 0-10

## 2025-06-09 ASSESSMENT — PAIN DESCRIPTION - PAIN TYPE: TYPE: ACUTE PAIN

## 2025-06-09 ASSESSMENT — PAIN SCALES - GENERAL
PAINLEVEL_OUTOF10: 6
PAINLEVEL_OUTOF10: 0 - NO PAIN

## 2025-06-09 NOTE — ASSESSMENT & PLAN NOTE
- Continue home atorvastatin 80 mg daily  -Continue home Zetia 10 mg daily  -Continue home lisinopril 10 mg daily  -Continue home metoprolol 12.5 mg twice daily  -Continue home spironolactone 25 mg daily  -Hold home Coumadin

## 2025-06-09 NOTE — ED PROVIDER NOTES
Emergency Department Provider Note        History of Present Illness     History provided by: Patient, Family Member, and Significant Other  Limitations to History: Altered Mental Status  External Records Reviewed with Brief Summary: None    HPI:  Nahid Gastelum is a 75 y.o. male presenting to the [facility] emergency department by alondra for having altered mental status after taking his wife's medication on accident last night.  The patient recently had a ventriculoperitoneal shunt placed for hydrocephalus and has been at his house for a week now after leaving acute rehab.  Over the past week the patient has been increasingly confused and has been incontinent.  Last night the patient did take his wife medication instead of his own on accident, these medications include hydrocodone, quetiapine, baclofen, zolpidem, hydroxychloroquine, clonazepam, gabapentin, letrozole, and Cevimeline.  The patient is not responding appropriately when asked questions but is still protecting his airway.    Physical Exam   Triage vitals:  T 36.3 °C (97.3 °F)  HR 99  /85  RR 18  O2 99 % Supplemental oxygen    Physical Exam  Vitals and nursing note reviewed.   Constitutional:       General: He is not in acute distress.     Appearance: He is not ill-appearing, toxic-appearing or diaphoretic.      Comments: Somnolent but arousable   HENT:      Head: Normocephalic and atraumatic.      Right Ear: External ear normal.      Left Ear: External ear normal.      Nose: Nose normal. No congestion or rhinorrhea.      Mouth/Throat:      Mouth: Mucous membranes are moist.      Pharynx: Oropharynx is clear.   Eyes:      General: No scleral icterus.        Right eye: No discharge.         Left eye: No discharge.      Conjunctiva/sclera: Conjunctivae normal.      Comments: Pinpoint pupils   Cardiovascular:      Rate and Rhythm: Tachycardia present. Rhythm irregular.      Pulses: Normal pulses.      Heart sounds: Normal heart sounds. No murmur  heard.  Pulmonary:      Effort: Pulmonary effort is normal. No respiratory distress.      Breath sounds: Normal breath sounds. No wheezing.   Abdominal:      General: There is no distension.      Palpations: Abdomen is soft.   Musculoskeletal:         General: No swelling, deformity or signs of injury.      Cervical back: Normal range of motion.      Right lower leg: No edema.      Left lower leg: No edema.   Skin:     General: Skin is warm and dry.      Capillary Refill: Capillary refill takes less than 2 seconds.   Neurological:      Mental Status: He is easily aroused. He is lethargic.      Comments: Unable to properly assess due to lethargy/somnolence   Psychiatric:      Comments: Unable to properly assess psych due to somnolence/lethargy          Medical Decision Making & ED Course   Medical Decision Makin y.o. male presenting with altered mental status after ingesting wife's pills at approximately 9 PM last night.  The patient remains tachycardic with mildly elevated blood pressures and is afebrile.  The patient requires 2 L/min oxygen will normally he does not require any oxygen.    The patient has been increasingly become more confused over the past week while returning from the acute care rehab.  Poison control was contacted and they advised her he on handling the patient's condition is appreciated.    Altered mental status/encephalopathy/metabolic workup will be started for the patient.  Due to him having his  shunt placed previously a shunt series will be added alongside the CT of the head.    On my interpretation of labs, results are unimpressive for systemic inflammation or infection, acute anemia or blood loss, SHWETA, hepatitis, or electrolyte abnormalities.     The patient's INR is highly supratherapeutic at 4.6.    The  shunt is determined by x-ray to not have a fracture in the line for leak.  CT for head shows a collection of fluid occupying the subdural space that is  subacute.    Neurosurgery consult is performed and the neurosurgeon, Dr. Ortiz, suggests for a shunt valve adjustment at [facility].    The patient requires no further treatment for reversing his INR, he is stable for being transferred to [facility].  ER at [facility] accepts patient and the family the patient is notified and is comfortable with his transfer and the plan for treatment.    Shared decision making for disposition  Patient and/or patient´s representative was counseled regarding labs, imaging, likely diagnosis. All questions were answered. Recommendation was made for Transfer given the need for further escalation of care to inpatient management. Patient agreed and was transferred in stable condition. Accepting team was notified of any pending labs or imaging to ensure continuity of care.     ----      Differential diagnoses considered include but are not limited to: Overdose on accidental ingestion,     Social Determinants of Health which Significantly Impact Care: None identified     EKG Independent Interpretation: Interpreted in the ED course    Independent Result Review and Interpretation: Relevant laboratory and radiographic results were reviewed and independently interpreted by myself.  As necessary, they are commented on in the ED Course.    Chronic conditions affecting the patient's care: As documented above in [facility]    The patient was discussed with the following consultants/services: Dr. Ortiz, neurosurgery, and and Dr. Ken, ER attending who accepted for ER to ER transfer to [facility]    Care Considerations: As documented above in [facility]    ED Course:  ED Course as of 06/09/25 2210 Mon Jun 09, 2025   1004 EKG performed at 09: 40 and independently reviewed by provider: Reveals atrial fibrillation with rapid ventricular response with a rate of 108 bpm, normal axis, normal intervals, no ST changes, no T wave abnormalities, PVC versus ventricular aberrancy noted. No STEMI. [TL]   1017   Attending note  75 y.o. male  with a past medical history of HTN, HLD, Afib, MVR (on Warfarin), TIA, DM, BCC, urinary frequency/BPH, dementia, NPH Brought in for altered mental status.  Accidentally took his wife's medications.    increasingly confused and incontinent over the last week.  She was recently admitted and discharged on 5/19/2025.  On external record review most recent discharge summary point 5/19/2025 shows admission for NPH. 5/15 s/p R occipital VPS (Certas at 5), CTH cath in position [FN]   1100 9 PM ingestion   hydrocodone/acetaminophen 5-3 25   zolpidem tartrate 10 mg   clonazepam 0.5 mg   hydroxychloroquine sulfate 200 mg   baclofen 10 mg   gabapentin 100 mg   cevimeline hydrochloride 30 mg   quitiepine fumarate 100 mg    letrozole 2.5 mg [FN]   1104 INR(!): 4.6  On warfarin - last dose 2mg on Sat [FN]   1116 Blood Gas Venous Full Panel [NORMAN]   1120 Quetiapine is the longest acting medication of the ones he takes   Watch for QT prolongation and wide QRS  [FN]   1123 Notified on fluid collection by radiology we will contact neurosurgery team.  Some concern for chronic subdural Hemorrhage especially in the setting of supratherapeutic INR [FN]   1136 Neurosurgery has been contacted for consult, awaiting their response [NORMAN]   1141 POCT pH, Venous: 7.36  Patient VBG has some abnormalities, he has been showing signs of hypoxia while on exam bed.  Overall pH and CO2 levels are showing no concern for retaining CO2 and being acidotic. [NORMAN]   1154 INR(!): 4.6  There is concern for chronic fluid collection and blood.  Neurosurgery team recommends [place] [facility] [place]  [place]unt adjusted.  They do not recommend urgent reversal of anticoagulation. [FN]   1154 The patient will be transferred downtown for adjustment of his valve for his  shunt. [NORMAN]      ED Course User Index  [FN] Celina Negrete MD  [NORMAN] Tyler Mccarty DO  [TL] Mejia Cabrera DO         Diagnoses as of 06/09/25 2210   Accidental overdose, initial encounter      Disposition   As a result of their workup, the patient will require transfer to another facility.  The patient and/or his guardian/representative is agreeable to transfer at this time.   We will continue to monitor and manage the patient in the Emergency Department until transport for transfer can be arranged.    Procedures   Procedures    Patient seen and discussed with ED attending physician.    Tyler Mccarty,   Emergency Medicine     Tyler Mccarty DO  Resident  06/09/25 2248
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
## 2025-06-09 NOTE — ED PROVIDER NOTES
Emergency Department Provider Note        History of Present Illness     History provided by: Patient and Nursing Staff  Limitations to History: Altered Mental Status  External Records Reviewed with Brief Summary: ED provider note from [facility] prior to transfer, patient was seen for altered mental status today, found to have chronic SDH/hygroma, recent  shunt placement. Last night took wife's medications by accident, including hydrocodone, quetiapine, baclofen, zolpidem, hydroxychloroquine, clonazepam, gabapentin, letrozole, and Cevimeline. Transfer for tox and NSGY evals    HPI:  Nahid Gastelum is a 75 y.o. male with a past medical history of HTN, HLD, Afib, MVR (on Warfarin), TIA, DM, BCC, urinary frequency/BPH, dementia, NPH presenting as a transfer from [facility].  He initially presented for altered mental status after accidentally taking his wife's medications last night.  Over the last week, he has been increasingly confused and incontinent.  Workup at OSH reveals supratherapeutic INR and hygroma.  He has been hemodynamically stable and protecting his airway, although markedly drowsy but arousable.    Physical Exam   Triage vitals:  T    HR 97  /86  RR (!) 24  O2 100 % Supplemental oxygen    Physical Exam  Vitals and nursing note reviewed.   Constitutional:       General: He is not in acute distress.     Appearance: He is ill-appearing.   HENT:      Head: Normocephalic and atraumatic.   Eyes:      Comments: Bilateral pinpoint pupils   Cardiovascular:      Rate and Rhythm: Normal rate and regular rhythm.      Heart sounds: Normal heart sounds.   Pulmonary:      Effort: Pulmonary effort is normal.      Breath sounds: Rhonchi present. No wheezing or rales.   Abdominal:      Palpations: Abdomen is soft.      Tenderness: There is no abdominal tenderness.   Musculoskeletal:      Cervical back: No rigidity.   Skin:     General: Skin is warm and dry.   Neurological:      Mental Status:  He is easily aroused. He is lethargic.      GCS: GCS eye subscore is 3. GCS verbal subscore is 4. GCS motor subscore is 6.      Cranial Nerves: Dysarthria present. No facial asymmetry.      Comments: Neuroexam limited by mental status, however no gross abnormalities observed  Patient oriented to self, place, time.  Disoriented to situation        Medical Decision Making & ED Course   Medical Decision Makin y.o. male with a past medical history of HTN, HLD, Afib, MVR (on Warfarin), TIA, DM, BCC, urinary frequency/BPH, dementia, NPH presenting as a transfer from [facility] for altered mental status.  CT head and shunt series were obtained at OSH.  CT did demonstrate chronic fluid accumulation, and neurosurgery was consulted here at [facility].  They evaluated the patient and determined there was no need for surgical intervention.  His mental status changes are likely due to polypharmacy.  Medical toxicology was consulted as well, and will follow inpatient.  Acute tox panel was negative.  CBC and CMP were grossly unremarkable.  Repeat coags did find elevated INR of 4.5.  Narcan was administered with no change of mental status.  Chest x-ray did not demonstrate any acute findings either.  He was discussed with admissions coordinators and ultimately excepted for admission to medicine for continued workup and management of altered mental status, and correction of supratherapeutic INR.  ----    Differential diagnoses considered include but are not limited to: Intracranial hemorrhage, increased ICP, anticholinergic ingestion, benzodiazepine ingestion, opioid ingestion, Tylenol overdose, salicylate overdose     Social Determinants of Health which Significantly Impact Care: None identified     EKG Independent Interpretation: EKG obtained at [facility].  The EKG obtained at 0940 was independently interpreted by myself. It demonstrates atrial fibrillation with a ventricular rate of 108.  Normal axis. Intervals  normal. ST segments showed no signs of acute ischemia.  No prior EKG available for comparison.    Independent Result Review and Interpretation: Relevant laboratory and radiographic results were reviewed and independently interpreted by myself.  As necessary, they are commented on in the ED Course.    Chronic conditions affecting the patient's care: As documented above in [facility]    The patient was discussed with the following consultants/services: Medical toxicology, neurosurgery, admissions coordinators    Care Considerations: As documented above in [facility]    ED Course:  ED Course as of 06/09/25 1824 Mon Jun 09, 2025   1536 CBC and Auto Differential(!)  Grossly unremarkable, no leukocytosis, stable mild anemia, normal platelets [MG]   1537 Blood Gas Venous Full Panel(!)  Mild hypercarbia has improved from previous gas [MG]   1608 INR(!): 4.5 [MG]   1608 Acute Toxicology Panel, Blood [MG]   1608 Comprehensive metabolic panel(!)  Unremarkable [MG]   1608 XR chest 1 view  No acute process [MG]   1609 No improvement in mental status with Narcan administration [MG]   1609 Neurosurgery evaluated patient and do not believe hygroma is cause of his symptoms.  There is no indication for acute surgical intervention.  Will instead proceed with admission to medicine for continued management with med tox consult.  He will need INR correction, however this does not need to be done emergently as he does not have any surgical needs at the moment [MG]      ED Course User Index  [MG] Faby Hernandez MD         Diagnoses as of 06/09/25 1824   Altered mental status   Polypharmacy     Disposition   As a result of their workup, the patient will require admission to the hospital.  The patient was informed of his diagnosis.  The patient was given the opportunity to ask questions and I answered them. The patient agreed to be admitted to the hospital.    Patient seen and discussed with ED attending physician.    Faby Hernandez MD  Emergency  Medicine      Faby Hernandez MD  Resident  06/09/25 182       Faby Hernandez MD  Resident  06/09/25 1820

## 2025-06-09 NOTE — CONSULTS
Reason For Consult  AMS with hx of  shunt placement     History Of Present Illness  Nahid Gastelum is a 75 y.o. male with PMH significant for CHF, Afib, MVR (on Warfarin), TIA, BCC, BPH, dementia, depression, HLD, HTN, T2DM, and NPH s/p R occipital  shunt placement by Dr. Melton on 5/15. He presented to the ED as a transfer from [facility] for altered mental status after accidentally taking his wife's pills last night. Per wife, last night he apparently took one pill each of hydrocodone, quetiapine, baclofen, zolpidem, hydroxychloroquine, clonazepam, gabapentin, letrozole, and Cevimeline. Since then, he has been difficult to arouse.      While he has baseline confusion, wife and daughter attest that after the shunt placement, his mental status and prior neurologic symptoms had been improving up until this current episode.      On encounter, patient was lethargic but able to respond to questioning, and was alert and oriented, which was an improvement from earlier in the day according to family. Per chart review, he was not responding appropriately to questions when examined at [facility].     Past Medical History  He has a past medical history of Acute upper respiratory infection, unspecified (11/01/2013), Anxiety, Arthritis, BPH (benign prostatic hyperplasia), CHF (congestive heart failure), Chronic pain disorder, Cough, unspecified (11/01/2013), Dementia, Depression, Encounter for immunization (11/01/2013), GERD (gastroesophageal reflux disease), Hydrocephalus, Hyperlipidemia, Hypertension, Irregular heart beat, Pain in unspecified shoulder (11/01/2013), Personal history of colonic polyps (04/22/2015), Personal history of other diseases of the circulatory system (04/22/2015), Type 2 diabetes mellitus, and Vision loss.     Surgical History  He has a past surgical history that includes Colonoscopy (11/20/2013).     Social History  He reports that he has never smoked. He has never used smokeless tobacco. He  "reports that he does not drink alcohol and does not use drugs.     Family History  [Family History]    [Family History]         Problem Relation Name Age of Onset    Colon cancer Mother        Heart attack Father        Diabetes Father            Allergies  Amlodipine, Sulfamethoxazole, and Sulfamethoxazole-trimethoprim     Review of Systems  ROS limited due to patient's AMS. See HPI.      Physical Exam  AAO x3, in no acute distress  Laying in bed with eye closed, mouth open, but opens eyes when prompted  Breathing comfortably on supplemental O2  Borderline tachycardia, normotensive on monitor  BLE 5/5 stength     Last Recorded Vitals  Blood pressure 138/86, pulse 97, resp. rate (!) 24, height 1.702 m (5' 7\"), weight 62.6 kg (138 lb), SpO2 100%.     Relevant Results  6/9 CTH:   shunt terminating in R lateral ventricle  R subdural hypodense fluid collection with slight midline shift, favoring hygroma vs SDH     Assessment/Plan  Nahid Gastelum is a 75M with PMH of CHF, Afib, MVR (on Warfarin), TIA, BCC, BPH, dementia, depression, HLD, HTN, T2DM, and NPH s/p R occipital  shunt placement by Dr. Melton on 5/15. Per family, mental status had been improving after VPS placement. 6/9 presented to ED as transfer from [place] for AMS after accidentally ingesting wife's pills last night, including hydrocodone, quetiapine, baclofen, zolpidem, clonazepam, gabapentin. Since then has been difficult to arouse, but based on chart review and per family mental status has been improving. Due to timing of AMS after medication ingestion, and reported improvement of neurologic symptoms after VPS placement, symptoms most likely 2/2 medications rather than shunt function. CTH 6/9 with R subdural fluid collection favoring hyrgroma. Certas dialed to 7.      Plan:  - Agree with Toxicology consult  - continue to monitor for clinical improvement  - Please obtain skull X-ray orthogonal to VPS valve to evaluate setting  - Hold Warfarin for " now  - Recommend admission to medicine for further workup and monitoring for resolution of symptoms  - Will discuss with attending re: timing of repeat CTH    Will continue to follow, staffed w/ Dr. Melton

## 2025-06-09 NOTE — ED TRIAGE NOTES
Pt presented to ED as a transfer from [facility] for evaluation for chronic SHD with midline shift. The pt originally presented to [facility] for having altered mental status after taking his wife's medication on accident last night. These medications include hydrocodone, quetiapine, baclofen, zolpidem, hydroxychloroquine, clonazepam, gabapentin, letrozole, and Cevimeline. Secondary to this pt recently had a  shunt placed for hydrocephalus and now has SHD with midline shift.

## 2025-06-09 NOTE — ASSESSMENT & PLAN NOTE
- CT scan shows subdural hypodense fluid collection measuring 8 mm compressing on the right cerebral hemisphere with slight shift of midline structures to the left  -Neurosurgery consult and appreciate recs  -Per neuro obtain a skull x-ray orthogonal to VPS valve to evaluate setting  -Hold warfarin  -Will need repeat CTh

## 2025-06-10 ENCOUNTER — APPOINTMENT (OUTPATIENT)
Dept: RADIOLOGY | Facility: HOSPITAL | Age: 75
DRG: 917 | End: 2025-06-10
Payer: MEDICARE

## 2025-06-10 PROBLEM — R79.1 SUPRATHERAPEUTIC INR: Status: ACTIVE | Noted: 2025-06-10

## 2025-06-10 LAB
ALBUMIN SERPL BCP-MCNC: 3.4 G/DL (ref 3.4–5)
ANION GAP SERPL CALC-SCNC: 13 MMOL/L (ref 10–20)
ANION GAP SERPL CALC-SCNC: 15 MMOL/L (ref 10–20)
BUN SERPL-MCNC: 13 MG/DL (ref 6–23)
BUN SERPL-MCNC: 13 MG/DL (ref 6–23)
CALCIUM SERPL-MCNC: 9 MG/DL (ref 8.6–10.6)
CALCIUM SERPL-MCNC: 9.3 MG/DL (ref 8.6–10.6)
CHLORIDE SERPL-SCNC: 103 MMOL/L (ref 98–107)
CHLORIDE SERPL-SCNC: 104 MMOL/L (ref 98–107)
CO2 SERPL-SCNC: 26 MMOL/L (ref 21–32)
CO2 SERPL-SCNC: 28 MMOL/L (ref 21–32)
CREAT SERPL-MCNC: 0.64 MG/DL (ref 0.5–1.3)
CREAT SERPL-MCNC: 0.68 MG/DL (ref 0.5–1.3)
EGFRCR SERPLBLD CKD-EPI 2021: >90 ML/MIN/1.73M*2
EGFRCR SERPLBLD CKD-EPI 2021: >90 ML/MIN/1.73M*2
ERYTHROCYTE [DISTWIDTH] IN BLOOD BY AUTOMATED COUNT: 13.3 % (ref 11.5–14.5)
GLUCOSE BLD MANUAL STRIP-MCNC: 139 MG/DL (ref 74–99)
GLUCOSE BLD MANUAL STRIP-MCNC: 149 MG/DL (ref 74–99)
GLUCOSE BLD MANUAL STRIP-MCNC: 173 MG/DL (ref 74–99)
GLUCOSE BLD MANUAL STRIP-MCNC: 203 MG/DL (ref 74–99)
GLUCOSE SERPL-MCNC: 143 MG/DL (ref 74–99)
GLUCOSE SERPL-MCNC: 148 MG/DL (ref 74–99)
HCT VFR BLD AUTO: 38.5 % (ref 41–52)
HGB BLD-MCNC: 12.3 G/DL (ref 13.5–17.5)
INR PPP: 4 (ref 0.9–1.1)
MCH RBC QN AUTO: 30.3 PG (ref 26–34)
MCHC RBC AUTO-ENTMCNC: 31.9 G/DL (ref 32–36)
MCV RBC AUTO: 95 FL (ref 80–100)
NRBC BLD-RTO: 0 /100 WBCS (ref 0–0)
PHOSPHATE SERPL-MCNC: 3.5 MG/DL (ref 2.5–4.9)
PLATELET # BLD AUTO: 176 X10*3/UL (ref 150–450)
POTASSIUM SERPL-SCNC: 4 MMOL/L (ref 3.5–5.3)
POTASSIUM SERPL-SCNC: 4 MMOL/L (ref 3.5–5.3)
PROTHROMBIN TIME: 44 SECONDS (ref 9.8–12.4)
Q ONSET: 223 MS
QRS COUNT: 18 BEATS
QRS DURATION: 78 MS
QT INTERVAL: 324 MS
QTC CALCULATION(BAZETT): 434 MS
QTC FREDERICIA: 394 MS
R AXIS: 41 DEGREES
RBC # BLD AUTO: 4.06 X10*6/UL (ref 4.5–5.9)
SODIUM SERPL-SCNC: 140 MMOL/L (ref 136–145)
SODIUM SERPL-SCNC: 141 MMOL/L (ref 136–145)
T AXIS: 12 DEGREES
T OFFSET: 385 MS
VENTRICULAR RATE: 108 BPM
WBC # BLD AUTO: 7.6 X10*3/UL (ref 4.4–11.3)

## 2025-06-10 PROCEDURE — 70250 X-RAY EXAM OF SKULL: CPT

## 2025-06-10 PROCEDURE — 85610 PROTHROMBIN TIME: CPT | Performed by: PHARMACIST

## 2025-06-10 PROCEDURE — 2500000001 HC RX 250 WO HCPCS SELF ADMINISTERED DRUGS (ALT 637 FOR MEDICARE OP): Performed by: NURSE PRACTITIONER

## 2025-06-10 PROCEDURE — 2500000002 HC RX 250 W HCPCS SELF ADMINISTERED DRUGS (ALT 637 FOR MEDICARE OP, ALT 636 FOR OP/ED): Performed by: NURSE PRACTITIONER

## 2025-06-10 PROCEDURE — 1200000002 HC GENERAL ROOM WITH TELEMETRY DAILY

## 2025-06-10 PROCEDURE — 70250 X-RAY EXAM OF SKULL: CPT | Performed by: RADIOLOGY

## 2025-06-10 PROCEDURE — 99233 SBSQ HOSP IP/OBS HIGH 50: CPT | Performed by: INTERNAL MEDICINE

## 2025-06-10 PROCEDURE — 2500000004 HC RX 250 GENERAL PHARMACY W/ HCPCS (ALT 636 FOR OP/ED): Performed by: INTERNAL MEDICINE

## 2025-06-10 PROCEDURE — 80069 RENAL FUNCTION PANEL: CPT | Performed by: NURSE PRACTITIONER

## 2025-06-10 PROCEDURE — 36415 COLL VENOUS BLD VENIPUNCTURE: CPT | Performed by: NURSE PRACTITIONER

## 2025-06-10 PROCEDURE — 80048 BASIC METABOLIC PNL TOTAL CA: CPT | Mod: CCI | Performed by: INTERNAL MEDICINE

## 2025-06-10 PROCEDURE — 82947 ASSAY GLUCOSE BLOOD QUANT: CPT

## 2025-06-10 PROCEDURE — 2500000004 HC RX 250 GENERAL PHARMACY W/ HCPCS (ALT 636 FOR OP/ED): Performed by: STUDENT IN AN ORGANIZED HEALTH CARE EDUCATION/TRAINING PROGRAM

## 2025-06-10 PROCEDURE — 99222 1ST HOSP IP/OBS MODERATE 55: CPT | Performed by: EMERGENCY MEDICINE

## 2025-06-10 PROCEDURE — 85027 COMPLETE CBC AUTOMATED: CPT | Performed by: NURSE PRACTITIONER

## 2025-06-10 PROCEDURE — 2500000004 HC RX 250 GENERAL PHARMACY W/ HCPCS (ALT 636 FOR OP/ED): Performed by: NURSE PRACTITIONER

## 2025-06-10 RX ORDER — ENOXAPARIN SODIUM 100 MG/ML
40 INJECTION SUBCUTANEOUS EVERY 24 HOURS
Status: DISCONTINUED | OUTPATIENT
Start: 2025-06-10 | End: 2025-06-10

## 2025-06-10 RX ORDER — AMOXICILLIN 250 MG
2 CAPSULE ORAL 2 TIMES DAILY
Status: DISCONTINUED | OUTPATIENT
Start: 2025-06-10 | End: 2025-06-15 | Stop reason: HOSPADM

## 2025-06-10 RX ADMIN — TAMSULOSIN HYDROCHLORIDE 0.4 MG: 0.4 CAPSULE ORAL at 20:45

## 2025-06-10 RX ADMIN — Medication 1 TABLET: at 10:00

## 2025-06-10 RX ADMIN — METOPROLOL TARTRATE 12.5 MG: 25 TABLET, FILM COATED ORAL at 20:43

## 2025-06-10 RX ADMIN — SODIUM CHLORIDE 1000 ML: 0.9 INJECTION, SOLUTION INTRAVENOUS at 10:23

## 2025-06-10 RX ADMIN — MAGNESIUM OXIDE TAB 400 MG (241.3 MG ELEMENTAL MG) 1 TABLET: 400 (241.3 MG) TAB at 08:48

## 2025-06-10 RX ADMIN — TAMSULOSIN HYDROCHLORIDE 0.4 MG: 0.4 CAPSULE ORAL at 08:47

## 2025-06-10 RX ADMIN — INSULIN LISPRO 2 UNITS: 100 INJECTION, SOLUTION INTRAVENOUS; SUBCUTANEOUS at 10:27

## 2025-06-10 RX ADMIN — OXYCODONE HYDROCHLORIDE AND ACETAMINOPHEN 250 MG: 500 TABLET ORAL at 08:47

## 2025-06-10 RX ADMIN — SPIRONOLACTONE 25 MG: 25 TABLET, FILM COATED ORAL at 08:47

## 2025-06-10 RX ADMIN — FERROUS SULFATE TAB 325 MG (65 MG ELEMENTAL FE) 1 TABLET: 325 (65 FE) TAB at 08:48

## 2025-06-10 RX ADMIN — METOPROLOL TARTRATE 12.5 MG: 25 TABLET, FILM COATED ORAL at 08:46

## 2025-06-10 RX ADMIN — INSULIN LISPRO 4 UNITS: 100 INJECTION, SOLUTION INTRAVENOUS; SUBCUTANEOUS at 15:28

## 2025-06-10 RX ADMIN — MINOCYCLINE HYDROCHLORIDE 100 MG: 100 CAPSULE ORAL at 10:00

## 2025-06-10 RX ADMIN — ATORVASTATIN CALCIUM 80 MG: 80 TABLET, FILM COATED ORAL at 20:43

## 2025-06-10 RX ADMIN — FLUTICASONE PROPIONATE 1 SPRAY: 50 SPRAY, METERED NASAL at 08:48

## 2025-06-10 RX ADMIN — EZETIMIBE 10 MG: 10 TABLET ORAL at 08:48

## 2025-06-10 RX ADMIN — SODIUM CHLORIDE, SODIUM LACTATE, POTASSIUM CHLORIDE, AND CALCIUM CHLORIDE 500 ML: .6; .31; .03; .02 INJECTION, SOLUTION INTRAVENOUS at 11:06

## 2025-06-10 RX ADMIN — FAMOTIDINE 40 MG: 20 TABLET, FILM COATED ORAL at 20:45

## 2025-06-10 RX ADMIN — DONEPEZIL HYDROCHLORIDE 10 MG: 10 TABLET, FILM COATED ORAL at 20:44

## 2025-06-10 RX ADMIN — LISINOPRIL 10 MG: 10 TABLET ORAL at 08:48

## 2025-06-10 ASSESSMENT — ENCOUNTER SYMPTOMS
ACTIVITY CHANGE: 1
CONFUSION: 1

## 2025-06-10 NOTE — CONSULTS
Medical Toxicology Initial Consult Note    Inpatient consult to Toxicology  Consult performed by: Mandi Peter MD  Consult ordered by: Elliot Ken MD  Reason for consult: Encephalopathy after multisubstance ingestion      History Of Present Illness  Nahid Gastelum is a 75 y.o. male presenting with encephalopathy. Medical toxicology consulted for evaluation and management after multi-substance ingestion.    On 6/8 he took his wife's evening medications which includes baclofen, quetiapine, zolpidem, hydroxychloroquine, clonazepam, gabapentin, letrozole, and cevimeline. His wife did not notice this until the next morning. She did give him his hydrocodone the previous night because she didn't realize he had taken her pills. Only one pill each was missing. He was difficult to arouse the next morning so she called EMS. Noted to have pinpoint pupils and tachycardia at [facility]. CT head was performed which showed a subdural fluid collection with elevated INR concerning for SDH. Transferred to [facility] for neurosurgery evaluation.     At [facility] he was given naloxone with no change in mental status. Seen by neurosurgery who thinks subdural fluid collection is a hygroma rather than hemorrhage.  shunt had appropriate functioning per their evaluation.      Prior to 6/8, his wife had noticed increased confusion and a slow, shuffled gait over the last few days. He was forgetting where he was going in the house or was using his walker backwards. She thinks his confusion lead to him taking the pills.     Today his family says that he is more alert than yesterday but still more sleepy compared to normal and has slightly slurred speech.    Past Medical History  He has a past medical history of Acute upper respiratory infection, unspecified (11/01/2013), Anxiety, Arthritis, BPH (benign prostatic hyperplasia), CHF (congestive heart failure), Chronic pain disorder, Cough, unspecified (11/01/2013), Dementia, Depression, Encounter for  immunization (11/01/2013), GERD (gastroesophageal reflux disease), Hydrocephalus, Hyperlipidemia, Hypertension, Irregular heart beat, Pain in unspecified shoulder (11/01/2013), Personal history of colonic polyps (04/22/2015), Personal history of other diseases of the circulatory system (04/22/2015), Type 2 diabetes mellitus, and Vision loss.    Surgical History  He has a past surgical history that includes Colonoscopy (11/20/2013).     Social History  He reports that he has never smoked. He has never used smokeless tobacco. He reports that he does not drink alcohol and does not use drugs.    Family History  Family History[1]     Allergies  Amlodipine; Grass pollen-june ([place] blue) grass, std; Sulfamethoxazole; Sulfamethoxazole-trimethoprim; and Trimethoprim    Review of Systems   Constitutional:  Positive for activity change.   Psychiatric/Behavioral:  Positive for confusion.         Objective  Last Recorded Vitals  BP (!) 104/92   Pulse 89   Temp 36.7 °C (98 °F)   Resp 11   Wt 62.6 kg (138 lb)   SpO2 99%     Physical Exam  Vital signs and nursing notes reviewed.   General: Intermittently falling asleep but mostly awake, cooperative, non-toxic appearing  HEENT: Normocephalic, atraumatic, normal nares without rhinorrhea, normal auricles without otorrhea, pupils are 3 mm bilaterally, equal round and reactive, EOMI, no conjunctival injection, no scleral icterus, moist mucous membranes  Cardiovascular: Regular rate and rhythm, radial pulses 2+ bilaterally   Pulmonary: Non-labored breathing  Abdomen: Soft, non-tender, non-distended  Musculoskeletal: Normal muscle tone and bulk, no lower extremity edema  Neurologic: CN2-12 grossly intact, normal strength throughout, no focal sensory deficit noted, no tremor, no hyperreflexia, no clonus  Skin: Clean, dry, and intact without lesion or rash      Relevant Results  INR 4.6 > 4  RFP with serial normal K  AST and ALT normal  VBG w/o hypercarbia or respiratory  "acidosis  APAP, ASA, EtOH negative    Problem List  Polysubstance ingestion  Sedative hypnotic toxidrome  Acute encephaloapthy    Assessment:  Nahid Gastelum is a 75 y.o. male on day 1 of admission presenting with encephalopathy after multidrug ingestion.    Baclofen - CYNTHIA B agonist, can cause CNS depression and can enhance airway compromise when used with opioids, no specific antidote    Hydrocodone - opioid medication, causes CNS depression, pinpoint pupils, hypopnea, bradypnea. Antidote is opioid antagonist naloxone which is short acting.    Quetiapine - antipsychotic that also has anticholinergic and alpha antagonism, usually causes sleepiness and hypotension in overdose, risk of anticholinergic toxicity, typically well treated with supportive care including IV fluids for hypotension    Zolpidem - \"z\" drug that modulates CYNTHIA A activity, sleeping medication, causes CNS depression, can enhance airway compromise when combined with opioids, no specific antidote,    Hydroxychloroquine - antimalaria and antirheumatic agent, works by blocking DNA and RNA synthesis, overdose can lead to cardiac toxicty and life threatening hypokalemia. There is no specific antidote and treatment is supportive with special consideration towards potassium management and QTC monitoring.     Clonazepam - benzodiazepine, works by CYNTHIA A agonism which inhibits nerve conduction, leads to CNS depression and can enhance airway compromise when combined with opioids, antidote is flumazenil, a CYNTHIA A receptor antagonist, which can be used safely in patients who are not chronically on benzodiazepines. Like naloxone, flumazenil is short acting.    Gabapentin - originally designed as an anticonvulsant, now more often used for neuropathic pain, decreases neurotransmission in nerves, in overdose leads to CNS depression and enhances airway compromise when combined with opioids, no specific antidote    Letrozole - an aromatase inhibitor used in breast " cancer treatment, there is limited data on overdose effects, on case report of an ingestion of 25 tablets did not have significant effects    Cevimeline - cholinergic agent used for xerostomia, cholinergic toxicity can present with hypersalivation, bradycardia, hypotension, bronchorrhea, CNS depression, and pinpoint pupils. Treatment is use of an anticholinergic agent, most typically atropine but benztropine, diphenhydramine, scopolamine, etc. Are also effective    The most dangerous medications he took were oxycodone and hydroxychloroquine. Potassium and cardiac activity have been stable so I have low concern for significant cardiac effects from hydroxychloroquine. His main toxidrome is sedative-hypnotic which can be cause by several of the medications on his list. Naloxone and flumazenil could be used but would only give partial responses as most of the medications he took do not have a specific antidote. Given slow improvement, I do no think there is need for a specific antidote at this time and would recommend continued supportive care. If he does not return to baseline by tomorrow, it is unlikely the remaining confusion and lethargy are related to drug ingestion and medical work up should continue.     Recommendations:  - Continue to monitor at this time, no indication for naloxone or flumazenil on today's evaluation  - Avoid serotonergic medications like fentanyl  - Avoid anticholinergic medications like antipsychotics  - Maintain K > 3.5  - If still lethargic and confused by tomorrow morning, further medical work up should be initiated as the medications he took should be fully metabolized by then    Thank you for allowing us to participate in this patient's care. Medical toxicology will continue to follow. Please contact the medical  on call through [employer] Secure Chat ([facility] Medical Toxicology) for questions, concerns, or changes in patient status.    Mandi Peter MD  Medical Toxicology    Attending Physician    I spent 60 minutes in the professional and overall care of this patient.          [1]   Family History  Problem Relation Name Age of Onset    Colon cancer Mother      Heart attack Father      Diabetes Father

## 2025-06-10 NOTE — PROGRESS NOTES
"Nahid Gastelum is a 75 y.o. male on day 1 of admission presenting with Altered mental status.    Subjective   More alert than yesterday, but unable to recall year. Subjectively feels like he is improving. On further discussion with family, patient was exhibiting increasing confusion and abnormal behavior starting 3 days ago, including forgetting things and walking backwards. They believe this led to the medication mistake.        Objective     Physical Exam  AAO x2 (not oriented to year), in no acute distress  Laying in bed comfortably  Breathing comfortably on supplemental O2  Borderline tachycardia, normotensive on monitor  BUE, BLE 5/5 stength    Last Recorded Vitals  Blood pressure 103/74, pulse 80, temperature 36.7 °C (98 °F), resp. rate 19, height 1.702 m (5' 7\"), weight 62.6 kg (138 lb), SpO2 99%.  Intake/Output last 3 Shifts:  I/O last 3 completed shifts:  In: - (0 mL/kg)   Out: 650 (10.4 mL/kg) [Urine:650 (0.3 mL/kg/hr)]  Weight: 62.6 kg     Relevant Results  Results for orders placed or performed during the hospital encounter of 06/09/25 (from the past 24 hours)   POCT GLUCOSE   Result Value Ref Range    POCT Glucose 108 (H) 74 - 99 mg/dL   Protime-INR   Result Value Ref Range    Protime 44.0 (H) 9.8 - 12.4 seconds    INR 4.0 (H) 0.9 - 1.1   Renal Function Panel   Result Value Ref Range    Glucose 148 (H) 74 - 99 mg/dL    Sodium 141 136 - 145 mmol/L    Potassium 4.0 3.5 - 5.3 mmol/L    Chloride 104 98 - 107 mmol/L    Bicarbonate 28 21 - 32 mmol/L    Anion Gap 13 10 - 20 mmol/L    Urea Nitrogen 13 6 - 23 mg/dL    Creatinine 0.64 0.50 - 1.30 mg/dL    eGFR >90 >60 mL/min/1.73m*2    Calcium 9.0 8.6 - 10.6 mg/dL    Phosphorus 3.5 2.5 - 4.9 mg/dL    Albumin 3.4 3.4 - 5.0 g/dL   CBC   Result Value Ref Range    WBC 7.6 4.4 - 11.3 x10*3/uL    nRBC 0.0 0.0 - 0.0 /100 WBCs    RBC 4.06 (L) 4.50 - 5.90 x10*6/uL    Hemoglobin 12.3 (L) 13.5 - 17.5 g/dL    Hematocrit 38.5 (L) 41.0 - 52.0 %    MCV 95 80 - 100 fL    MCH " 30.3 26.0 - 34.0 pg    MCHC 31.9 (L) 32.0 - 36.0 g/dL    RDW 13.3 11.5 - 14.5 %    Platelets 176 150 - 450 x10*3/uL   POCT GLUCOSE   Result Value Ref Range    POCT Glucose 139 (H) 74 - 99 mg/dL   POCT GLUCOSE   Result Value Ref Range    POCT Glucose 173 (H) 74 - 99 mg/dL   POCT GLUCOSE   Result Value Ref Range    POCT Glucose 203 (H) 74 - 99 mg/dL              Assessment & Plan  Altered mental status    Polypharmacy    NPH (normal pressure hydrocephalus) (Multi)    Gait disturbance    Nahid Gastelum is a 75M with PMH of CHF, Afib, MVR (on Warfarin), TIA, BCC, BPH, dementia, depression, HLD, HTN, T2DM, and NPH s/p R occipital  shunt placement by Dr. Melton on 5/15. Per family, mental status had been improving after VPS placement. 6/9 presented to ED as transfer from [place] for AMS after accidentally ingesting wife's pills last night, including hydrocodone, quetiapine, baclofen, zolpidem, clonazepam, gabapentin. Since then has been difficult to arouse, but based on chart review and per family mental status has been improving. Due to timing of AMS after medication ingestion, and reported improvement of neurologic symptoms after VPS placement, however with increasing confusion during the 2 days prior to medication ingestion. Symptoms caused by medication effect vs problem with shunt. CTH 6/9 with R subdural fluid collection favoring hyrgroma. Certas dialed to 7.     Improving but not yet back to baseline today per family.      Plan:  - Toxicology consulted: if not back to baseline by 6/11 AM, further medical workup should be purused  - NSGY to re-eval tomorrow AM  - continue to monitor for clinical improvement  - Post-shunt dial skull X-ray obtained   - hold Warfarin for now  - Recommend admission to medicine for further workup and monitoring for resolution of symptoms  - discussion of repeat CTH pending serial exams      Simone Noble MD

## 2025-06-10 NOTE — PROGRESS NOTES
Nahid Gastelum is a 75 y.o. male on day 1 of admission presenting with Altered mental status.      Subjective   Patient was seen and examined.  He is lethargic but oriented x 3.  Daughter and wife at bedside.  Has no other new complaints today.  Neurosurgery on board and plan skull x-ray for evaluation of  shunt.  Objective     Last Recorded Vitals  /86   Pulse 84   Temp 36.7 °C (98 °F)   Resp 12   Wt 62.6 kg (138 lb)   SpO2 100%   Intake/Output last 3 Shifts:    Intake/Output Summary (Last 24 hours) at 6/10/2025 1748  Last data filed at 6/10/2025 0100  Gross per 24 hour   Intake --   Output 650 ml   Net -650 ml       Admission Weight  Weight: 62.6 kg (138 lb) (06/09/25 1429)    Daily Weight  06/09/25 : 62.6 kg (138 lb)    Image Results  XR skull 1-3 views  Narrative: Interpreted By:  Brian Leal,   STUDY:  XR SKULL 1-3 VIEWS; ;  6/9/2025 8:11 pm      INDICATION:  Signs/Symptoms:verify shunt setting.          COMPARISON:  None.      ACCESSION NUMBER(S):  [id number]      ORDERING CLINICIAN:  VALENTINA CROCKETT      FINDINGS:  Lateral view of the skull.      Parietal ventricular shunt catheter noted without kinking or  discontinuity.      Impression: Parietal ventricular shunt catheter seen for shunt valve settings.      MACRO:  None      Signed by: Brian Leal 6/10/2025 8:32 AM  Dictation workstation:   [id number]  ECG 12 lead  Atrial fibrillation with rapid ventricular response with premature ventricular or aberrantly conducted complexes  Abnormal ECG  No previous ECGs available  Confirmed by Charly Hernández ([id number]) on 6/10/2025 8:30:04 AM      Physical Exam  Vitals:    06/10/25 1800   BP: 107/73   Pulse: 86   Resp: 10   Temp:    SpO2: 100%     Constitutional:       Appearance: Normal appearance.   HENT:      Head: Normocephalic.      Mouth/Throat:      Mouth: Mucous membranes are dry.   Eyes:      Extraocular Movements: Extraocular movements intact.      Conjunctiva/sclera: Conjunctivae normal.       Pupils: Pupils are equal, round, and reactive to light.   Cardiovascular:      Rate and Rhythm: Normal rate and regular rhythm.      Pulses: Normal pulses.      Heart sounds: Normal heart sounds, S1 normal and S2 normal.   Pulmonary:      Effort: Pulmonary effort is normal.      Breath sounds: Normal breath sounds and air entry.   Abdominal:      General: Abdomen is flat. Bowel sounds are normal.      Palpations: Abdomen is soft.   Musculoskeletal:         General: Normal range of motion.      Cervical back: Full passive range of motion without pain and normal range of motion.   Skin:     General: Skin is warm and dry.   Neurological:      General: No focal deficit present.      Mental Status: He lethargic but oriented x 3  Psychiatric:         Attention and Perception: Attention normal.         Mood and Affect: Mood normal.         Speech: Speech normal.   Relevant Results                              Assessment & Plan  Altered mental status  Accidental overdose  NPH (normal pressure hydrocephalus) (Multi)  - CT scan shows subdural hypodense fluid collection measuring 8 mm compressing on the right cerebral hemisphere with slight shift of midline structures to the left  -Per neuro obtain a skull x-ray orthogonal to VPS valve to evaluate setting  -Hold warfarin  -Will need repeat Cth  -Follow skull x-ray for evaluation of  shunt  - Neurosurgery on board  Benign essential hypertension  Atrial fibrillation (Multi)  CHF (congestive heart failure)  Hyperlipidemia  Mitral valve regurgitation  - Continue home atorvastatin 80 mg daily  -Continue home Zetia 10 mg daily  -Continue home lisinopril 10 mg daily  -Continue home metoprolol 12.5 mg twice daily  -Continue home spironolactone 25 mg daily  -Hold home Coumadin    Anemia of chronic disease  - Continue home iron 325 mg daily  -Hgb stable no acute bleeding noted  BPH with urinary obstruction  - Continue home tamsulosin 0.4 mg twice daily  DDD (degenerative disc  disease), cervical  Osteoarthritis of both knees  Cervical spinal stenosis  - Continue home Voltaren gel to knees  Dementia  Anxiety disorder  - Continue home donepezil 10 mg daily  Diabetes mellitus, type 2 (Multi)  - Hold home metformin  -Humalog SSI 3 times daily and at bedtime while in the hospital  Gastroesophageal reflux disease  - Continue home famotidine 40 mg nightly  Supratherapeutic INR  Continue to hold Coumadin  Trend INR daily       Spent 35 mins in the follow up management of this patient.      Viraj Phelps MD

## 2025-06-10 NOTE — ASSESSMENT & PLAN NOTE
- CT scan shows subdural hypodense fluid collection measuring 8 mm compressing on the right cerebral hemisphere with slight shift of midline structures to the left  -Per neuro obtain a skull x-ray orthogonal to VPS valve to evaluate setting  -Hold warfarin  -Will need repeat Cth  -Follow skull x-ray for evaluation of  shunt  - Neurosurgery on board

## 2025-06-11 LAB
BASOPHILS # BLD MANUAL: 0 X10*3/UL (ref 0–0.1)
BASOPHILS NFR BLD MANUAL: 0 %
BLASTS # BLD MANUAL: 0 X10*3/UL
BLASTS NFR BLD MANUAL: 0 %
BURR CELLS BLD QL SMEAR: ABNORMAL
EOSINOPHIL # BLD MANUAL: 0.13 X10*3/UL (ref 0–0.4)
EOSINOPHIL NFR BLD MANUAL: 1.7 %
ERYTHROCYTE [DISTWIDTH] IN BLOOD BY AUTOMATED COUNT: 13.1 % (ref 11.5–14.5)
GLUCOSE BLD MANUAL STRIP-MCNC: 133 MG/DL (ref 74–99)
GLUCOSE BLD MANUAL STRIP-MCNC: 147 MG/DL (ref 74–99)
GLUCOSE BLD MANUAL STRIP-MCNC: 197 MG/DL (ref 74–99)
GLUCOSE BLD MANUAL STRIP-MCNC: 211 MG/DL (ref 74–99)
HCT VFR BLD AUTO: 34.9 % (ref 41–52)
HGB BLD-MCNC: 11.5 G/DL (ref 13.5–17.5)
IMM GRANULOCYTES # BLD AUTO: 0.04 X10*3/UL (ref 0–0.5)
IMM GRANULOCYTES NFR BLD AUTO: 0.5 % (ref 0–0.9)
INR PPP: 3.4 (ref 0.9–1.1)
LYMPHOCYTES # BLD MANUAL: 0.52 X10*3/UL (ref 0.8–3)
LYMPHOCYTES NFR BLD MANUAL: 6.7 %
MCH RBC QN AUTO: 30.4 PG (ref 26–34)
MCHC RBC AUTO-ENTMCNC: 33 G/DL (ref 32–36)
MCV RBC AUTO: 92 FL (ref 80–100)
METAMYELOCYTES # BLD MANUAL: 0 X10*3/UL
METAMYELOCYTES NFR BLD MANUAL: 0 %
MONOCYTES # BLD MANUAL: 0.66 X10*3/UL (ref 0.05–0.8)
MONOCYTES NFR BLD MANUAL: 8.4 %
MYELOCYTES # BLD MANUAL: 0 X10*3/UL
MYELOCYTES NFR BLD MANUAL: 0 %
NEUTROPHILS # BLD MANUAL: 6.49 X10*3/UL (ref 1.6–5.5)
NEUTS BAND # BLD MANUAL: 0 X10*3/UL (ref 0–0.5)
NEUTS BAND NFR BLD MANUAL: 0 %
NEUTS SEG # BLD MANUAL: 6.49 X10*3/UL (ref 1.6–5)
NEUTS SEG NFR BLD MANUAL: 83.2 %
NRBC BLD MANUAL-RTO: 0 % (ref 0–0)
NRBC BLD-RTO: 0 /100 WBCS (ref 0–0)
OVALOCYTES BLD QL SMEAR: ABNORMAL
PLASMA CELLS # BLD MANUAL: 0 X10*3/UL
PLASMA CELLS NFR BLD MANUAL: 0 %
PLATELET # BLD AUTO: 94 X10*3/UL (ref 150–450)
PROMYELOCYTES # BLD MANUAL: 0 X10*3/UL
PROMYELOCYTES NFR BLD MANUAL: 0 %
PROTHROMBIN TIME: 37.4 SECONDS (ref 9.8–12.4)
RBC # BLD AUTO: 3.78 X10*6/UL (ref 4.5–5.9)
RBC MORPH BLD: ABNORMAL
TOTAL CELLS COUNTED BLD: 119
TSH SERPL-ACNC: 1.32 MIU/L (ref 0.44–3.98)
VARIANT LYMPHS # BLD MANUAL: 0 X10*3/UL (ref 0–0.3)
VARIANT LYMPHS NFR BLD: 0 %
VIT B12 SERPL-MCNC: 280 PG/ML (ref 211–911)
WBC # BLD AUTO: 7.8 X10*3/UL (ref 4.4–11.3)

## 2025-06-11 PROCEDURE — 85027 COMPLETE CBC AUTOMATED: CPT | Performed by: INTERNAL MEDICINE

## 2025-06-11 PROCEDURE — 82947 ASSAY GLUCOSE BLOOD QUANT: CPT

## 2025-06-11 PROCEDURE — 99231 SBSQ HOSP IP/OBS SF/LOW 25: CPT | Performed by: EMERGENCY MEDICINE

## 2025-06-11 PROCEDURE — 2500000002 HC RX 250 W HCPCS SELF ADMINISTERED DRUGS (ALT 637 FOR MEDICARE OP, ALT 636 FOR OP/ED): Performed by: NURSE PRACTITIONER

## 2025-06-11 PROCEDURE — 2500000004 HC RX 250 GENERAL PHARMACY W/ HCPCS (ALT 636 FOR OP/ED): Performed by: NURSE PRACTITIONER

## 2025-06-11 PROCEDURE — 1200000002 HC GENERAL ROOM WITH TELEMETRY DAILY

## 2025-06-11 PROCEDURE — 84443 ASSAY THYROID STIM HORMONE: CPT | Performed by: INTERNAL MEDICINE

## 2025-06-11 PROCEDURE — 85610 PROTHROMBIN TIME: CPT | Performed by: INTERNAL MEDICINE

## 2025-06-11 PROCEDURE — 82607 VITAMIN B-12: CPT | Performed by: INTERNAL MEDICINE

## 2025-06-11 PROCEDURE — 36415 COLL VENOUS BLD VENIPUNCTURE: CPT | Performed by: INTERNAL MEDICINE

## 2025-06-11 PROCEDURE — 2500000001 HC RX 250 WO HCPCS SELF ADMINISTERED DRUGS (ALT 637 FOR MEDICARE OP): Performed by: NURSE PRACTITIONER

## 2025-06-11 PROCEDURE — 85007 BL SMEAR W/DIFF WBC COUNT: CPT | Performed by: INTERNAL MEDICINE

## 2025-06-11 PROCEDURE — 99233 SBSQ HOSP IP/OBS HIGH 50: CPT | Performed by: NURSE PRACTITIONER

## 2025-06-11 RX ORDER — HYDROCODONE BITARTRATE AND ACETAMINOPHEN 5; 325 MG/1; MG/1
1 TABLET ORAL EVERY 12 HOURS
Status: ON HOLD | COMMUNITY
End: 2025-06-14

## 2025-06-11 RX ORDER — TRAMADOL HYDROCHLORIDE 50 MG/1
50 TABLET, FILM COATED ORAL DAILY PRN
COMMUNITY
End: 2025-06-15 | Stop reason: HOSPADM

## 2025-06-11 RX ADMIN — OXYCODONE HYDROCHLORIDE AND ACETAMINOPHEN 250 MG: 500 TABLET ORAL at 10:09

## 2025-06-11 RX ADMIN — EZETIMIBE 10 MG: 10 TABLET ORAL at 10:09

## 2025-06-11 RX ADMIN — INSULIN LISPRO 4 UNITS: 100 INJECTION, SOLUTION INTRAVENOUS; SUBCUTANEOUS at 12:06

## 2025-06-11 RX ADMIN — FAMOTIDINE 40 MG: 20 TABLET, FILM COATED ORAL at 21:47

## 2025-06-11 RX ADMIN — POLYETHYLENE GLYCOL 3350 17 G: 17 POWDER, FOR SOLUTION ORAL at 21:47

## 2025-06-11 RX ADMIN — SPIRONOLACTONE 25 MG: 25 TABLET, FILM COATED ORAL at 10:08

## 2025-06-11 RX ADMIN — DONEPEZIL HYDROCHLORIDE 10 MG: 10 TABLET, FILM COATED ORAL at 21:47

## 2025-06-11 RX ADMIN — MAGNESIUM OXIDE TAB 400 MG (241.3 MG ELEMENTAL MG) 1 TABLET: 400 (241.3 MG) TAB at 10:08

## 2025-06-11 RX ADMIN — SENNOSIDES AND DOCUSATE SODIUM 2 TABLET: 50; 8.6 TABLET ORAL at 21:47

## 2025-06-11 RX ADMIN — FERROUS SULFATE TAB 325 MG (65 MG ELEMENTAL FE) 1 TABLET: 325 (65 FE) TAB at 10:08

## 2025-06-11 RX ADMIN — METOPROLOL TARTRATE 12.5 MG: 25 TABLET, FILM COATED ORAL at 10:09

## 2025-06-11 RX ADMIN — FLUTICASONE PROPIONATE 1 SPRAY: 50 SPRAY, METERED NASAL at 10:10

## 2025-06-11 RX ADMIN — ATORVASTATIN CALCIUM 80 MG: 80 TABLET, FILM COATED ORAL at 21:47

## 2025-06-11 RX ADMIN — TAMSULOSIN HYDROCHLORIDE 0.4 MG: 0.4 CAPSULE ORAL at 21:47

## 2025-06-11 RX ADMIN — Medication 1 TABLET: at 10:09

## 2025-06-11 RX ADMIN — METOPROLOL TARTRATE 12.5 MG: 25 TABLET, FILM COATED ORAL at 21:47

## 2025-06-11 SDOH — SOCIAL STABILITY: SOCIAL INSECURITY: WITHIN THE LAST YEAR, HAVE YOU BEEN HUMILIATED OR EMOTIONALLY ABUSED IN OTHER WAYS BY YOUR PARTNER OR EX-PARTNER?: NO

## 2025-06-11 SDOH — ECONOMIC STABILITY: INCOME INSECURITY: IN THE PAST 12 MONTHS HAS THE ELECTRIC, GAS, OIL, OR WATER COMPANY THREATENED TO SHUT OFF SERVICES IN YOUR HOME?: NO

## 2025-06-11 SDOH — SOCIAL STABILITY: SOCIAL INSECURITY
WITHIN THE LAST YEAR, HAVE YOU BEEN RAPED OR FORCED TO HAVE ANY KIND OF SEXUAL ACTIVITY BY YOUR PARTNER OR EX-PARTNER?: NO

## 2025-06-11 SDOH — SOCIAL STABILITY: SOCIAL INSECURITY: DO YOU FEEL UNSAFE GOING BACK TO THE PLACE WHERE YOU ARE LIVING?: NO

## 2025-06-11 SDOH — SOCIAL STABILITY: SOCIAL INSECURITY: DOES ANYONE TRY TO KEEP YOU FROM HAVING/CONTACTING OTHER FRIENDS OR DOING THINGS OUTSIDE YOUR HOME?: NO

## 2025-06-11 SDOH — SOCIAL STABILITY: SOCIAL INSECURITY: HAVE YOU HAD THOUGHTS OF HARMING ANYONE ELSE?: NO

## 2025-06-11 SDOH — SOCIAL STABILITY: SOCIAL INSECURITY: HAVE YOU HAD ANY THOUGHTS OF HARMING ANYONE ELSE?: NO

## 2025-06-11 SDOH — SOCIAL STABILITY: SOCIAL INSECURITY: DO YOU FEEL ANYONE HAS EXPLOITED OR TAKEN ADVANTAGE OF YOU FINANCIALLY OR OF YOUR PERSONAL PROPERTY?: NO

## 2025-06-11 SDOH — SOCIAL STABILITY: SOCIAL INSECURITY
WITHIN THE LAST YEAR, HAVE YOU BEEN KICKED, HIT, SLAPPED, OR OTHERWISE PHYSICALLY HURT BY YOUR PARTNER OR EX-PARTNER?: NO

## 2025-06-11 SDOH — SOCIAL STABILITY: SOCIAL INSECURITY: WITHIN THE LAST YEAR, HAVE YOU BEEN AFRAID OF YOUR PARTNER OR EX-PARTNER?: NO

## 2025-06-11 SDOH — SOCIAL STABILITY: SOCIAL INSECURITY: ARE THERE ANY APPARENT SIGNS OF INJURIES/BEHAVIORS THAT COULD BE RELATED TO ABUSE/NEGLECT?: NO

## 2025-06-11 SDOH — SOCIAL STABILITY: SOCIAL INSECURITY: WERE YOU ABLE TO COMPLETE ALL THE BEHAVIORAL HEALTH SCREENINGS?: YES

## 2025-06-11 SDOH — SOCIAL STABILITY: SOCIAL INSECURITY: ABUSE: ADULT

## 2025-06-11 SDOH — SOCIAL STABILITY: SOCIAL INSECURITY: ARE YOU OR HAVE YOU BEEN THREATENED OR ABUSED PHYSICALLY, EMOTIONALLY, OR SEXUALLY BY ANYONE?: NO

## 2025-06-11 SDOH — SOCIAL STABILITY: SOCIAL INSECURITY: HAS ANYONE EVER THREATENED TO HURT YOUR FAMILY OR YOUR PETS?: NO

## 2025-06-11 ASSESSMENT — PATIENT HEALTH QUESTIONNAIRE - PHQ9
2. FEELING DOWN, DEPRESSED OR HOPELESS: NOT AT ALL
SUM OF ALL RESPONSES TO PHQ9 QUESTIONS 1 & 2: 0
1. LITTLE INTEREST OR PLEASURE IN DOING THINGS: NOT AT ALL

## 2025-06-11 ASSESSMENT — COGNITIVE AND FUNCTIONAL STATUS - GENERAL
TURNING FROM BACK TO SIDE WHILE IN FLAT BAD: A LITTLE
HELP NEEDED FOR BATHING: A LOT
CLIMB 3 TO 5 STEPS WITH RAILING: A LOT
MOVING FROM LYING ON BACK TO SITTING ON SIDE OF FLAT BED WITH BEDRAILS: A LITTLE
WALKING IN HOSPITAL ROOM: A LOT
TOILETING: A LITTLE
DRESSING REGULAR LOWER BODY CLOTHING: A LITTLE
MOBILITY SCORE: 16
PERSONAL GROOMING: A LITTLE
MOVING TO AND FROM BED TO CHAIR: A LITTLE
DAILY ACTIVITIY SCORE: 18
PATIENT BASELINE BEDBOUND: NO
DRESSING REGULAR UPPER BODY CLOTHING: A LITTLE
STANDING UP FROM CHAIR USING ARMS: A LITTLE

## 2025-06-11 ASSESSMENT — ACTIVITIES OF DAILY LIVING (ADL)
HEARING - LEFT EAR: FUNCTIONAL
LACK_OF_TRANSPORTATION: NO
PATIENT'S MEMORY ADEQUATE TO SAFELY COMPLETE DAILY ACTIVITIES?: NO
ASSISTIVE_DEVICE: WALKER
JUDGMENT_ADEQUATE_SAFELY_COMPLETE_DAILY_ACTIVITIES: NO
HEARING - RIGHT EAR: FUNCTIONAL
LACK_OF_TRANSPORTATION: NO
DRESSING YOURSELF: NEEDS ASSISTANCE
WALKS IN HOME: NEEDS ASSISTANCE
GROOMING: NEEDS ASSISTANCE
ADEQUATE_TO_COMPLETE_ADL: YES
BATHING: NEEDS ASSISTANCE
TOILETING: NEEDS ASSISTANCE
FEEDING YOURSELF: INDEPENDENT

## 2025-06-11 ASSESSMENT — LIFESTYLE VARIABLES
HOW OFTEN DO YOU HAVE 6 OR MORE DRINKS ON ONE OCCASION: NEVER
HOW OFTEN DO YOU HAVE A DRINK CONTAINING ALCOHOL: NEVER
SKIP TO QUESTIONS 9-10: 1
AUDIT-C TOTAL SCORE: 0
HOW MANY STANDARD DRINKS CONTAINING ALCOHOL DO YOU HAVE ON A TYPICAL DAY: PATIENT DOES NOT DRINK
AUDIT-C TOTAL SCORE: 0

## 2025-06-11 NOTE — PROGRESS NOTES
Medical Toxicology Progress Note    Nahid Gastelum is a 75 y.o. male on day 2 of admission presenting with Altered mental status.    Subjective  No acute events overnight. Family reports that he is still demonstrating the confusion he had prior to taking the medications but is not as lethargic.    Objective  Last Recorded Vitals  /69   Pulse 85   Temp 36.6 °C (97.9 °F) (Tympanic)   Resp 16   Wt 62.6 kg (138 lb)   SpO2 99%     Physical Exam  Vital signs and nursing notes reviewed.   General: Sleeping on arrival, awoke very easily to voice  HEENT: Normocephalic, atraumatic, normal nares without rhinorrhea, normal auricles without otorrhea, pupils are 3 mm bilaterally, equal round and reactive, EOMI, no conjunctival injection, no scleral icterus, moist mucous membranes  Cardiovascular: Regular rate and rhythm, radial pulses 2+ bilaterally   Pulmonary: Non-labored breathing  Abdomen: Soft, non-tender, non-distended  Musculoskeletal: Normal muscle tone and bulk, no lower extremity edema  Neurologic: CN2-12 grossly intact, normal strength throughout, no focal sensory deficit noted, no tremor, no hyperreflexia, no clonus  Skin: Clean, dry, and intact without lesion or rash      Relevant Results  CBC w/ stable anemia although downtrending  INR decreasing  TSH and B12 normal    Problem List  Acute encephalopathy  Polysubstance ingestion    Assessment:  Nahid Gastelum is a 75 y.o. male on day 2 of admission presenting with acute encephalopathy and polysubstance ingestion.     Today he awakes more quickly and stays awake the entire conversation. His speech is no longer slurred and he is more clear and quick to answer questions. I spoke with his family over the phone and they still note he is confused, but this is similar to his state of confusion on 6/8 prior to taking the pills. He is no longer demonstrating signs of sedation and based on the medication his took, he is past the expected duration of action for a  single dose of each medication. From a medical toxicology perspective there is no additional work up and monitoring required for his drug ingestion, although his altered mental status and issues with acute confusion that lead to to the medication ingestion will need to be explored by the primary team and neurosurgery team.    Recommendations:  - No further drug effect at this time, no further work up or monitoring from a medical toxicology perspective  - Appreciate primary team and neurosurgery team's evaluation of altered mental status and acute confusion that lead to ingestion  - Discussed with spouse who plans to lock away medications in the future    Thank you for allowing us to participate in this patient's care. Medical toxicology will sign off at this time. Please contact the medical  on call through [employer] Secure Chat ([facility]) for questions, concerns, or changes in patient status.    Mandi Peter MD  Medical Toxicology   Attending Physician    I spent 25 minutes in the professional and overall care of this patient.
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
## 2025-06-11 NOTE — PROGRESS NOTES
Pharmacy Medication History Review    Nahid Gastelum is a 75 y.o. male admitted for Altered mental status. Pharmacy reviewed the patient's [id number] medications and allergies for accuracy.    Medications ADDED:  Norco  Tramadol - takes if needed/ not often  Medications CHANGED:  Warfarin - refer to table   Medications REMOVED/NOT TAKING:   Minocycline - reported stopped due to ADR  Lisinopril - reported stopped  Metformin - reported stopped  Spironolactone- reported stopped  Flomax- reported stopped    The list below reflects the updated PTA list.   Prior to Admission Medications   Prescriptions Last Dose Informant   HYDROcodone-acetaminophen (Norco) 5-325 mg tablet  Spouse/Significant Other, Family Member   Sig: Take 1 tablet by mouth every 12 hours.   acetaminophen (Tylenol) 325 mg tablet  Spouse/Significant Other, Family Member   Sig: Take 2 tablets (650 mg) by mouth every 6 hours if needed for mild pain (1 - 3).   ascorbic acid (Vitamin C) 250 mg tablet Past Week Spouse/Significant Other, Family Member   Sig: Take 1 tablet (250 mg) by mouth once daily.   atorvastatin (Lipitor) 80 mg tablet Past Week Spouse/Significant Other, Family Member   Sig: Take 1 tablet (80 mg) by mouth once daily.   b complex 0.4 mg tablet Past Week Spouse/Significant Other, Family Member   Sig: Take 1 tablet by mouth once daily.   calcium citrate (Calcitrate) 200 mg (950 mg) tablet Past Week Spouse/Significant Other, Family Member   Sig: Take 1 tablet by mouth once daily.   diclofenac sodium (Voltaren) 1 % gel Not Taking Spouse/Significant Other, Family Member   Sig: Apply 4.5 inches (4 g) topically 4 times a day as needed (knee pain).   Patient not taking: Reported on 6/11/2025   donepezil (Aricept) 10 mg tablet Past Week Spouse/Significant Other, Family Member   Sig: Take 1 tablet (10 mg) by mouth once daily at bedtime.   ezetimibe (Zetia) 10 mg tablet Past Week Spouse/Significant Other, Family Member   Sig: Take 1 tablet (10  mg) by mouth once daily.   famotidine (Pepcid) 40 mg tablet Past Week Spouse/Significant Other, Family Member   Sig: Take 1 tablet (40 mg) by mouth as needed at bedtime.   ferrous sulfate 325 (65 Fe) mg EC tablet Past Week Spouse/Significant Other, Family Member   Sig: Take 1 tablet by mouth once daily.   fluticasone (Flonase Allergy Relief) 50 mcg/actuation nasal spray  Spouse/Significant Other, Family Member   Sig: Administer into affected nostril(s) once daily.   magnesium oxide 400 mg magnesium capsule Past Week Spouse/Significant Other, Family Member   Sig: Take 1 capsule (400 mg) by mouth once daily.   metoprolol tartrate (Lopressor) 25 mg tablet Past Week Spouse/Significant Other, Family Member   Sig: Take 0.5 tablets (12.5 mg) by mouth 2 times a day.   minocycline 100 mg capsule Not Taking Spouse/Significant Other, Family Member   Sig: Take 1 capsule (100 mg) by mouth once daily.   Patient not taking: Reported on 6/11/2025   polyethylene glycol (Glycolax, Miralax) 17 gram packet  Spouse/Significant Other, Family Member   Sig: Take 17 g by mouth every 12 hours.   sennosides-docusate sodium (Charley-Colace) 8.6-50 mg tablet Not Taking Spouse/Significant Other, Family Member   Sig: Take 2 tablets by mouth 2 times a day.   Patient not taking: Reported on 6/11/2025   spironolactone (Aldactone) 25 mg tablet Not Taking Spouse/Significant Other, Family Member   Sig: Take 1 tablet (25 mg) by mouth once daily.   Patient not taking: Reported on 6/11/2025   tamsulosin (Flomax) 0.4 mg 24 hr capsule Not Taking Spouse/Significant Other, Family Member   Sig: Take 1 capsule (0.4 mg) by mouth 2 times a day.   Patient not taking: Reported on 6/11/2025   traMADol (Ultram) 50 mg tablet  Spouse/Significant Other, Family Member   Sig: Take 1 tablet (50 mg) by mouth once daily as needed for severe pain (7 - 10).   warfarin (Coumadin) 4 mg tablet 6/8/2025 Spouse/Significant Other, Family Member   Sig: Take 1 tablet (4 mg) by mouth  "M/W/Th/Sa and take 0.5 tablet (2 mg) by mouth T/F/[place]      Facility-Administered Medications: None        The list below reflects the updated allergy list. Please review each documented allergy for additional clarification and justification.  Allergies  Reviewed by Marci Clarke, APRN-CNP on 6/9/2025        Severity Reactions Comments    Amlodipine Not Specified Swelling     Grass Pollen-june (kentSurreal Gamesy Blue) Grass, Std Not Specified Unknown     Sulfamethoxazole Not Specified Other Pt ended up in the hospital, didn't remember why    Sulfamethoxazole-trimethoprim Not Specified Other     Trimethoprim Not Specified Unknown             Patient declines M2B at discharge.     Sources:   [facility]  Pharmacy dispense history  Spouse & Family Member - Good historians  Chart Review    Additional Comments:  Patient reported to have recently been in facility with meds changed. Spouse/ family member at bedside able to confirm all home meds and which were recently discontinued to the best of their knowledge . Spouse reported recently had coumadin clinic visit and warfarin adjusted to dosing as above      Christiano Cox, PharmD  06/11/25     Secure Chat preferred   If no response call [phone] or [employer] \"Med Rec\"    "

## 2025-06-11 NOTE — ASSESSMENT & PLAN NOTE
- CT scan shows subdural hypodense fluid collection measuring 8 mm compressing on the right cerebral hemisphere with slight shift of midline structures to the left  -Hold warfarin  -Will need repeat Cth  -Follow skull x-ray for evaluation of  shunt-results stable  - Neurosurgery on board  -Med tox signing off

## 2025-06-12 ENCOUNTER — APPOINTMENT (OUTPATIENT)
Dept: RADIOLOGY | Facility: HOSPITAL | Age: 75
DRG: 917 | End: 2025-06-12
Payer: MEDICARE

## 2025-06-12 LAB
ALBUMIN SERPL BCP-MCNC: 3.1 G/DL (ref 3.4–5)
ANION GAP SERPL CALC-SCNC: 10 MMOL/L (ref 10–20)
BASOPHILS # BLD AUTO: 0.04 X10*3/UL (ref 0–0.1)
BASOPHILS NFR BLD AUTO: 0.5 %
BUN SERPL-MCNC: 13 MG/DL (ref 6–23)
CALCIUM SERPL-MCNC: 8.6 MG/DL (ref 8.6–10.6)
CHLORIDE SERPL-SCNC: 104 MMOL/L (ref 98–107)
CO2 SERPL-SCNC: 29 MMOL/L (ref 21–32)
CREAT SERPL-MCNC: 0.62 MG/DL (ref 0.5–1.3)
EGFRCR SERPLBLD CKD-EPI 2021: >90 ML/MIN/1.73M*2
EOSINOPHIL # BLD AUTO: 0.15 X10*3/UL (ref 0–0.4)
EOSINOPHIL NFR BLD AUTO: 2 %
ERYTHROCYTE [DISTWIDTH] IN BLOOD BY AUTOMATED COUNT: 12.8 % (ref 11.5–14.5)
GLUCOSE BLD MANUAL STRIP-MCNC: 143 MG/DL (ref 74–99)
GLUCOSE BLD MANUAL STRIP-MCNC: 146 MG/DL (ref 74–99)
GLUCOSE BLD MANUAL STRIP-MCNC: 265 MG/DL (ref 74–99)
GLUCOSE BLD MANUAL STRIP-MCNC: 86 MG/DL (ref 74–99)
GLUCOSE SERPL-MCNC: 135 MG/DL (ref 74–99)
HCT VFR BLD AUTO: 39 % (ref 41–52)
HGB BLD-MCNC: 12 G/DL (ref 13.5–17.5)
IMM GRANULOCYTES # BLD AUTO: 0.06 X10*3/UL (ref 0–0.5)
IMM GRANULOCYTES NFR BLD AUTO: 0.8 % (ref 0–0.9)
INR PPP: 2 (ref 0.9–1.1)
LYMPHOCYTES # BLD AUTO: 0.94 X10*3/UL (ref 0.8–3)
LYMPHOCYTES NFR BLD AUTO: 12.5 %
MCH RBC QN AUTO: 30.3 PG (ref 26–34)
MCHC RBC AUTO-ENTMCNC: 30.8 G/DL (ref 32–36)
MCV RBC AUTO: 99 FL (ref 80–100)
MONOCYTES # BLD AUTO: 0.67 X10*3/UL (ref 0.05–0.8)
MONOCYTES NFR BLD AUTO: 8.9 %
NEUTROPHILS # BLD AUTO: 5.64 X10*3/UL (ref 1.6–5.5)
NEUTROPHILS NFR BLD AUTO: 75.3 %
NRBC BLD-RTO: 0 /100 WBCS (ref 0–0)
PHOSPHATE SERPL-MCNC: 3 MG/DL (ref 2.5–4.9)
PLATELET # BLD AUTO: 193 X10*3/UL (ref 150–450)
POTASSIUM SERPL-SCNC: 4.1 MMOL/L (ref 3.5–5.3)
PROTHROMBIN TIME: 22.3 SECONDS (ref 9.8–12.4)
RBC # BLD AUTO: 3.96 X10*6/UL (ref 4.5–5.9)
SODIUM SERPL-SCNC: 139 MMOL/L (ref 136–145)
WBC # BLD AUTO: 7.5 X10*3/UL (ref 4.4–11.3)

## 2025-06-12 PROCEDURE — 99233 SBSQ HOSP IP/OBS HIGH 50: CPT | Performed by: INTERNAL MEDICINE

## 2025-06-12 PROCEDURE — 85025 COMPLETE CBC W/AUTO DIFF WBC: CPT | Performed by: INTERNAL MEDICINE

## 2025-06-12 PROCEDURE — 2500000004 HC RX 250 GENERAL PHARMACY W/ HCPCS (ALT 636 FOR OP/ED): Performed by: NURSE PRACTITIONER

## 2025-06-12 PROCEDURE — 2500000001 HC RX 250 WO HCPCS SELF ADMINISTERED DRUGS (ALT 637 FOR MEDICARE OP): Performed by: NURSE PRACTITIONER

## 2025-06-12 PROCEDURE — 70450 CT HEAD/BRAIN W/O DYE: CPT

## 2025-06-12 PROCEDURE — 1200000002 HC GENERAL ROOM WITH TELEMETRY DAILY

## 2025-06-12 PROCEDURE — 36415 COLL VENOUS BLD VENIPUNCTURE: CPT | Performed by: INTERNAL MEDICINE

## 2025-06-12 PROCEDURE — 85610 PROTHROMBIN TIME: CPT | Performed by: INTERNAL MEDICINE

## 2025-06-12 PROCEDURE — 80069 RENAL FUNCTION PANEL: CPT | Performed by: NURSE PRACTITIONER

## 2025-06-12 PROCEDURE — 2500000001 HC RX 250 WO HCPCS SELF ADMINISTERED DRUGS (ALT 637 FOR MEDICARE OP): Performed by: INTERNAL MEDICINE

## 2025-06-12 PROCEDURE — 70450 CT HEAD/BRAIN W/O DYE: CPT | Performed by: RADIOLOGY

## 2025-06-12 PROCEDURE — 2500000002 HC RX 250 W HCPCS SELF ADMINISTERED DRUGS (ALT 637 FOR MEDICARE OP, ALT 636 FOR OP/ED): Performed by: NURSE PRACTITIONER

## 2025-06-12 PROCEDURE — 97162 PT EVAL MOD COMPLEX 30 MIN: CPT | Mod: GP

## 2025-06-12 PROCEDURE — 82947 ASSAY GLUCOSE BLOOD QUANT: CPT

## 2025-06-12 RX ORDER — ACETAMINOPHEN 325 MG/1
975 TABLET ORAL EVERY 8 HOURS PRN
Status: DISCONTINUED | OUTPATIENT
Start: 2025-06-12 | End: 2025-06-15 | Stop reason: HOSPADM

## 2025-06-12 RX ADMIN — DONEPEZIL HYDROCHLORIDE 10 MG: 10 TABLET, FILM COATED ORAL at 22:07

## 2025-06-12 RX ADMIN — FLUTICASONE PROPIONATE 1 SPRAY: 50 SPRAY, METERED NASAL at 18:59

## 2025-06-12 RX ADMIN — FERROUS SULFATE TAB 325 MG (65 MG ELEMENTAL FE) 1 TABLET: 325 (65 FE) TAB at 09:58

## 2025-06-12 RX ADMIN — TAMSULOSIN HYDROCHLORIDE 0.4 MG: 0.4 CAPSULE ORAL at 21:57

## 2025-06-12 RX ADMIN — TAMSULOSIN HYDROCHLORIDE 0.4 MG: 0.4 CAPSULE ORAL at 09:58

## 2025-06-12 RX ADMIN — LISINOPRIL 10 MG: 10 TABLET ORAL at 09:59

## 2025-06-12 RX ADMIN — METOPROLOL TARTRATE 12.5 MG: 25 TABLET, FILM COATED ORAL at 21:58

## 2025-06-12 RX ADMIN — ACETAMINOPHEN 975 MG: 325 TABLET ORAL at 18:58

## 2025-06-12 RX ADMIN — INSULIN LISPRO 6 UNITS: 100 INJECTION, SOLUTION INTRAVENOUS; SUBCUTANEOUS at 13:22

## 2025-06-12 RX ADMIN — ATORVASTATIN CALCIUM 80 MG: 80 TABLET, FILM COATED ORAL at 22:00

## 2025-06-12 RX ADMIN — Medication 1 TABLET: at 09:56

## 2025-06-12 RX ADMIN — MAGNESIUM OXIDE TAB 400 MG (241.3 MG ELEMENTAL MG) 1 TABLET: 400 (241.3 MG) TAB at 10:00

## 2025-06-12 RX ADMIN — EZETIMIBE 10 MG: 10 TABLET ORAL at 09:57

## 2025-06-12 RX ADMIN — SPIRONOLACTONE 25 MG: 25 TABLET, FILM COATED ORAL at 09:58

## 2025-06-12 RX ADMIN — FAMOTIDINE 40 MG: 20 TABLET, FILM COATED ORAL at 21:57

## 2025-06-12 RX ADMIN — OXYCODONE HYDROCHLORIDE AND ACETAMINOPHEN 250 MG: 500 TABLET ORAL at 09:56

## 2025-06-12 RX ADMIN — METOPROLOL TARTRATE 12.5 MG: 25 TABLET, FILM COATED ORAL at 09:59

## 2025-06-12 ASSESSMENT — PAIN SCALES - WONG BAKER: WONGBAKER_NUMERICALRESPONSE: NO HURT

## 2025-06-12 ASSESSMENT — PAIN - FUNCTIONAL ASSESSMENT: PAIN_FUNCTIONAL_ASSESSMENT: 0-10

## 2025-06-12 ASSESSMENT — COGNITIVE AND FUNCTIONAL STATUS - GENERAL
DRESSING REGULAR LOWER BODY CLOTHING: A LITTLE
DRESSING REGULAR LOWER BODY CLOTHING: A LITTLE
DAILY ACTIVITIY SCORE: 17
TOILETING: A LOT
TURNING FROM BACK TO SIDE WHILE IN FLAT BAD: A LOT
WALKING IN HOSPITAL ROOM: A LOT
DAILY ACTIVITIY SCORE: 18
HELP NEEDED FOR BATHING: A LOT
CLIMB 3 TO 5 STEPS WITH RAILING: TOTAL
MOVING TO AND FROM BED TO CHAIR: A LITTLE
MOVING TO AND FROM BED TO CHAIR: A LITTLE
MOVING FROM LYING ON BACK TO SITTING ON SIDE OF FLAT BED WITH BEDRAILS: A LITTLE
MOBILITY SCORE: 13
MOVING FROM LYING ON BACK TO SITTING ON SIDE OF FLAT BED WITH BEDRAILS: A LITTLE
MOVING FROM LYING ON BACK TO SITTING ON SIDE OF FLAT BED WITH BEDRAILS: A LITTLE
MOBILITY SCORE: 16
PERSONAL GROOMING: A LITTLE
DRESSING REGULAR UPPER BODY CLOTHING: A LITTLE
CLIMB 3 TO 5 STEPS WITH RAILING: TOTAL
STANDING UP FROM CHAIR USING ARMS: A LITTLE
WALKING IN HOSPITAL ROOM: A LOT
CLIMB 3 TO 5 STEPS WITH RAILING: A LOT
WALKING IN HOSPITAL ROOM: A LOT
DRESSING REGULAR UPPER BODY CLOTHING: A LITTLE
STANDING UP FROM CHAIR USING ARMS: A LITTLE
MOBILITY SCORE: 15
STANDING UP FROM CHAIR USING ARMS: A LOT
TURNING FROM BACK TO SIDE WHILE IN FLAT BAD: A LITTLE
TURNING FROM BACK TO SIDE WHILE IN FLAT BAD: A LITTLE
PERSONAL GROOMING: A LITTLE
TOILETING: A LITTLE
HELP NEEDED FOR BATHING: A LOT
MOVING TO AND FROM BED TO CHAIR: A LITTLE

## 2025-06-12 ASSESSMENT — PAIN SCALES - GENERAL
PAINLEVEL_OUTOF10: 0 - NO PAIN
PAINLEVEL_OUTOF10: 0 - NO PAIN

## 2025-06-12 ASSESSMENT — ACTIVITIES OF DAILY LIVING (ADL): LACK_OF_TRANSPORTATION: NO

## 2025-06-12 NOTE — PROGRESS NOTES
Nahid Gastelum is a 75 y.o. male on day 3 of admission presenting with Altered mental status.      Subjective   Patient was seen and examined at bedside. Patient was resting. Per daughter patient has the tendency to sleep a lot while resting but typically gets going when mobilizing     Objective     Last Recorded Vitals  /53 (BP Location: Right arm, Patient Position: Lying)   Pulse 82   Temp 37.2 °C (99 °F) (Temporal)   Resp 17   Wt 62.6 kg (138 lb)   SpO2 100%   Intake/Output last 3 Shifts:    Intake/Output Summary (Last 24 hours) at 6/12/2025 1931  Last data filed at 6/12/2025 0300  Gross per 24 hour   Intake 0 ml   Output --   Net 0 ml       Admission Weight  Weight: 62.6 kg (138 lb) (06/09/25 1429)    Daily Weight  06/09/25 : 62.6 kg (138 lb)    Image Results  XR skull 1-3 views  Narrative: Interpreted By:  Wellington Dyson,   STUDY:  XR SKULL 1-3 VIEWS      INDICATION:  Signs/Symptoms: shunt.      COMPARISON:  None          ACCESSION NUMBER(S):  [id number]      ORDERING CLINICIAN:  AMELIA HYLTON      FINDINGS:      Single-view profile the ventriculoperitoneal shunt at the calvarium.      No gross discontinuity or kinking.      Impression:     Single-view profile the ventriculoperitoneal shunt at the calvarium.      No gross discontinuity or kinking.      Signed by: Wellington Dyson 6/10/2025 6:43 PM  Dictation workstation:   [id number]  XR skull 1-3 views  Narrative: Interpreted By:  Brian Leal,   STUDY:  XR SKULL 1-3 VIEWS; ;  6/9/2025 8:11 pm      INDICATION:  Signs/Symptoms:verify shunt setting.          COMPARISON:  None.      ACCESSION NUMBER(S):  [id number]      ORDERING CLINICIAN:  VALENTINA CROCKETT      FINDINGS:  Lateral view of the skull.      Parietal ventricular shunt catheter noted without kinking or  discontinuity.      Impression: Parietal ventricular shunt catheter seen for shunt valve settings.      MACRO:  None      Signed by: Brian Leal 6/10/2025 8:32 AM  Dictation  workstation:   [id number]  ECG 12 lead  Atrial fibrillation with rapid ventricular response with premature ventricular or aberrantly conducted complexes  Abnormal ECG  No previous ECGs available  Confirmed by Charly Hernández ([id number]) on 6/10/2025 8:30:04 AM      Physical Exam  General: sitting in bed sleeping   HEENT: moist oral mucosa   Cardiac: Regular rate and rhythm, S1 and S2 present, no rubs, no murmurs, no gallops   Lungs: clear to auscultation bilaterally, no wheezing, no rhonchi  Abdomen: soft, obese, NT ND  EXT: no peripheral edema  Neuro: Aox3. Able to remember his age, , oriented to location, time, year, month and day     Relevant Results  Results for orders placed or performed during the hospital encounter of 25 (from the past 24 hours)   POCT GLUCOSE   Result Value Ref Range    POCT Glucose 197 (H) 74 - 99 mg/dL   POCT GLUCOSE   Result Value Ref Range    POCT Glucose 147 (H) 74 - 99 mg/dL   POCT GLUCOSE   Result Value Ref Range    POCT Glucose 146 (H) 74 - 99 mg/dL   CBC and Auto Differential   Result Value Ref Range    WBC 7.5 4.4 - 11.3 x10*3/uL    nRBC 0.0 0.0 - 0.0 /100 WBCs    RBC 3.96 (L) 4.50 - 5.90 x10*6/uL    Hemoglobin 12.0 (L) 13.5 - 17.5 g/dL    Hematocrit 39.0 (L) 41.0 - 52.0 %    MCV 99 80 - 100 fL    MCH 30.3 26.0 - 34.0 pg    MCHC 30.8 (L) 32.0 - 36.0 g/dL    RDW 12.8 11.5 - 14.5 %    Platelets 193 150 - 450 x10*3/uL    Neutrophils % 75.3 40.0 - 80.0 %    Immature Granulocytes %, Automated 0.8 0.0 - 0.9 %    Lymphocytes % 12.5 13.0 - 44.0 %    Monocytes % 8.9 2.0 - 10.0 %    Eosinophils % 2.0 0.0 - 6.0 %    Basophils % 0.5 0.0 - 2.0 %    Neutrophils Absolute 5.64 (H) 1.60 - 5.50 x10*3/uL    Immature Granulocytes Absolute, Automated 0.06 0.00 - 0.50 x10*3/uL    Lymphocytes Absolute 0.94 0.80 - 3.00 x10*3/uL    Monocytes Absolute 0.67 0.05 - 0.80 x10*3/uL    Eosinophils Absolute 0.15 0.00 - 0.40 x10*3/uL    Basophils Absolute 0.04 0.00 - 0.10 x10*3/uL   Protime-INR   Result Value  Ref Range    Protime 22.3 (H) 9.8 - 12.4 seconds    INR 2.0 (H) 0.9 - 1.1   Renal Function Panel   Result Value Ref Range    Glucose 135 (H) 74 - 99 mg/dL    Sodium 139 136 - 145 mmol/L    Potassium 4.1 3.5 - 5.3 mmol/L    Chloride 104 98 - 107 mmol/L    Bicarbonate 29 21 - 32 mmol/L    Anion Gap 10 10 - 20 mmol/L    Urea Nitrogen 13 6 - 23 mg/dL    Creatinine 0.62 0.50 - 1.30 mg/dL    eGFR >90 >60 mL/min/1.73m*2    Calcium 8.6 8.6 - 10.6 mg/dL    Phosphorus 3.0 2.5 - 4.9 mg/dL    Albumin 3.1 (L) 3.4 - 5.0 g/dL   POCT GLUCOSE   Result Value Ref Range    POCT Glucose 265 (H) 74 - 99 mg/dL   POCT GLUCOSE   Result Value Ref Range    POCT Glucose 86 74 - 99 mg/dL      Results for orders placed or performed during the hospital encounter of 06/09/25 (from the past 24 hours)   POCT GLUCOSE   Result Value Ref Range    POCT Glucose 197 (H) 74 - 99 mg/dL   POCT GLUCOSE   Result Value Ref Range    POCT Glucose 147 (H) 74 - 99 mg/dL   POCT GLUCOSE   Result Value Ref Range    POCT Glucose 146 (H) 74 - 99 mg/dL   CBC and Auto Differential   Result Value Ref Range    WBC 7.5 4.4 - 11.3 x10*3/uL    nRBC 0.0 0.0 - 0.0 /100 WBCs    RBC 3.96 (L) 4.50 - 5.90 x10*6/uL    Hemoglobin 12.0 (L) 13.5 - 17.5 g/dL    Hematocrit 39.0 (L) 41.0 - 52.0 %    MCV 99 80 - 100 fL    MCH 30.3 26.0 - 34.0 pg    MCHC 30.8 (L) 32.0 - 36.0 g/dL    RDW 12.8 11.5 - 14.5 %    Platelets 193 150 - 450 x10*3/uL    Neutrophils % 75.3 40.0 - 80.0 %    Immature Granulocytes %, Automated 0.8 0.0 - 0.9 %    Lymphocytes % 12.5 13.0 - 44.0 %    Monocytes % 8.9 2.0 - 10.0 %    Eosinophils % 2.0 0.0 - 6.0 %    Basophils % 0.5 0.0 - 2.0 %    Neutrophils Absolute 5.64 (H) 1.60 - 5.50 x10*3/uL    Immature Granulocytes Absolute, Automated 0.06 0.00 - 0.50 x10*3/uL    Lymphocytes Absolute 0.94 0.80 - 3.00 x10*3/uL    Monocytes Absolute 0.67 0.05 - 0.80 x10*3/uL    Eosinophils Absolute 0.15 0.00 - 0.40 x10*3/uL    Basophils Absolute 0.04 0.00 - 0.10 x10*3/uL   Protime-INR    Result Value Ref Range    Protime 22.3 (H) 9.8 - 12.4 seconds    INR 2.0 (H) 0.9 - 1.1   Renal Function Panel   Result Value Ref Range    Glucose 135 (H) 74 - 99 mg/dL    Sodium 139 136 - 145 mmol/L    Potassium 4.1 3.5 - 5.3 mmol/L    Chloride 104 98 - 107 mmol/L    Bicarbonate 29 21 - 32 mmol/L    Anion Gap 10 10 - 20 mmol/L    Urea Nitrogen 13 6 - 23 mg/dL    Creatinine 0.62 0.50 - 1.30 mg/dL    eGFR >90 >60 mL/min/1.73m*2    Calcium 8.6 8.6 - 10.6 mg/dL    Phosphorus 3.0 2.5 - 4.9 mg/dL    Albumin 3.1 (L) 3.4 - 5.0 g/dL   POCT GLUCOSE   Result Value Ref Range    POCT Glucose 265 (H) 74 - 99 mg/dL   POCT GLUCOSE   Result Value Ref Range    POCT Glucose 86 74 - 99 mg/dL      Scheduled medications  Scheduled Medications[1]  Continuous medications  Continuous Medications[2]  PRN medications  PRN Medications[3]     Assessment:   Mr Gastelum is a 75y male with PMHx: HTN, HLD, A-fib, MVR, TIA, T2DM, BPH, urinary frequency, dementia, NPH patient presented from [facility] for change in mental status neurosurgery consult.  Patient was recently admitted [facility]  5/15-5/19-he was found to have NPH and a  shunt was placed postoperative he developed change in mental status and was sent to [facility] for rehab--[facility].  Then he was sent to [place] for increased confusion and for accidentally taking his wife's medication instead of his own.  Medication he took of his wife's was hydrocodone, Seroquel, baclofen, Ambien, hydrochloric wine, clonazepam, gabapentin, letrozole and cevimeline.    While in the ED patient's vitals were stable labs were stable WNL troponins were negative.  CT head showed new right subdural hypodense fluid collection measuring 8 mm sick compressing on the right cerebral hemisphere with slight shift of midline structures to the left.  XR shunt series showed intact  shunt no evidence of fracture.  CXR showed no acute cardiopulmonary process.  Patient to be admitted for altered mental  status    Assessment:   Encephalopathy most likely polypharmacy        1. Improved       2. Med tox signed off     2.  NPH s/p  shunt       1. CT on 6/9 showed some hypodense fluid collection       2. Pending CT today as it was ordered priority but changed to stat now       3. Neurosurgery aware     3. Atrial Fibrillation/MVR     1. Patient is on lopressor 12.5 mg BID     2. Will continue to hold coumadin until clear by neurosurgery      3. Continue lisinopril 10 mg po daily      4. Continue aldactone 25 mg po daily      5. Continue home atorvastatin 80 mg po daily     4. BPH       1. Continue tamsulosin     5. DM     1. Holding home metformin      2. Continue sliding scale       6. Physical deconditioning      1. PT and OT rec snf         Dispo: pending repeat CT and neurosurgery clearance ok to resume coumadin.   PT recs SNF but wife will like to be here when re-evaluation to decide if he needs SNF vs home care.          Melissa Knight, DO           [1] ascorbic acid, 250 mg, oral, Daily  atorvastatin, 80 mg, oral, Nightly  calcium citrate, 200 mg of elemental calcium, oral, Daily  donepezil, 10 mg, oral, Nightly  ezetimibe, 10 mg, oral, Daily  famotidine, 40 mg, oral, Nightly  ferrous sulfate, 65 mg of elemental iron, oral, Daily  fluticasone, 1 spray, Each Nostril, Daily  insulin lispro, 0-10 Units, subcutaneous, Before meals & nightly  lisinopril, 10 mg, oral, Daily  magnesium oxide, 400 mg of magnesium oxide, oral, Daily  [Held by provider] metFORMIN, 500 mg, oral, BID  metoprolol tartrate, 12.5 mg, oral, BID  [Held by provider] minocycline, 100 mg, oral, Daily  polyethylene glycol, 17 g, oral, q12h  sennosides-docusate sodium, 2 tablet, oral, BID  spironolactone, 25 mg, oral, Daily  tamsulosin, 0.4 mg, oral, BID  [Held by provider] warfarin, 2.5 mg, oral, Once per day on Monday Wednesday Friday  [Held by provider] warfarin, 3 mg, oral, Once per day on Sunday Tuesday Thursday Saturday  [2]    [3] PRN  medications: acetaminophen, dextrose, dextrose, diclofenac sodium, glucagon, glucagon

## 2025-06-12 NOTE — CONSULTS
Wound Care Consult     Visit Date: 6/12/2025      Patient Name: Nahid Gastelum         MRN: [id number]             Reason for Consult: Wound to coccyx.        Wound History: Nahid Gastelum is a 75 y.o. male on day 3 of admission presenting with Altered mental status. Wife and daughter at bedside. State he has had the wound to his coccyx for months. It improves when he is at home walking around, but deteriorates when he returns to the hospital.           Assessment:  Wound 05/15/25 Surgical Head Right (Active)   Date First Assessed/Time First Assessed: 05/15/25 0850   Hand Hygiene Completed: Yes  Primary Wound Type: Surgical  Location: Head  Wound Location Orientation: Right      Assessments 6/12/2025  1:56 PM   Wound Image     Present on Admission to Healthcare Facility Yes   Charley-Wound Assessment Dry   Shape Linear U shape    Wound Length (cm) 5 cm   Wound Width (cm) 0.2 cm   Wound Surface Area (cm^2) 0.79 cm^2   Wound Depth (cm) 0 cm   Wound Volume (cm^3) 0 cm^3   State of Healing Healing ridge   Margins Attached edges   Closure Approximated   Treatments Cleansed   Sutures/Staple Line Approximated   Drainage Description None   Drainage Amount None   Dressing Open to air       No associated orders.       Wound 05/15/25 Coccyx (Active)   Date First Assessed/Time First Assessed: 05/15/25 1329   Location: Coccyx      Assessments 6/12/2025  2:06 PM   Wound Image      Present on Admission to Healthcare Facility Yes   Shape Round   Wound Length (cm) 0.6 cm   Wound Width (cm) 0.6 cm   Wound Surface Area (cm^2) 0.28 cm^2   Wound Depth (cm) 0.5 cm   Wound Volume (cm^3) 0.094 cm^3   Margins Well-defined edges   Drainage Description Serous;Yellow   Drainage Amount Scant   Dressing Changed New   Dressing Status Clean;Dry       Inactive Orders   Date Order Priority Status Authorizing Provider   06/10/25 2031 Inpatient Consult to Wound and Ostomy Nurse Routine Completed Liam Henry MD     - Reason for Consult?:    Wound  "  Patient sleeping on and off throughout the assessment, Wife and daughter Eugenia at bedside. Both state that he has had strange black discoloration to bilateral feet. 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 Heels are pink and blanching. Incision to right lateral head is dry and healing. No open skin on incision.   Coccyx with full thickness wound. Edges are macerated. Wound bed pink and red and clean.  Patient has a red, linear discoloration to his left buttocks that appears to be from adhesive, sluggish blanching. He also had a red [place] to his right hip after turning on his right hip.  After turning to his left, offloading for 5 minutes, it was blanching.     Recommendations:  - Coccyx , Cleanse with Vashe. Apply Triad to wound bed and cover with Mepilex.   - Left buttocks, mepilex from coccyx used to cover the linear discoloration.          Wound Plan: Wound team will not continue to follow. Please re-consult for worsening of wound.      SUZANNE GERHARDT, RN, BSN, CWOCN  6/12/2025  5:59 PM        "

## 2025-06-12 NOTE — PROGRESS NOTES
Physical Therapy    Physical Therapy Evaluation    Patient Name: Nahid Gastelum  MRN: [id number]  Department: [place] [address]  Room: [address] [place] [address]-  Today's Date: 6/12/2025   Time Calculation  Start Time: 1335  Stop Time: 1349  Time Calculation (min): 14 min    Assessment/Plan   PT Assessment  PT Assessment Results: Decreased strength, Decreased endurance, Impaired balance, Decreased mobility, Decreased cognition  Rehab Prognosis: Good  Barriers to Discharge Home: Caregiver assistance, Physical needs  Caregiver Assistance: Caregiver assistance needed per identified barriers - however, level of patient's required assistance exceeds assistance available at home  Physical Needs: Ambulating household distances limited by function/safety, High falls risk due to function or environment  End of Session Communication: Bedside nurse  Assessment Comment: Pt tolerated PT Eval fairly well, motivated to work with therapy. Functional mobility limited by cognition, impaired balance, dec strength and activity tolerance. Pt requires min-modA for all mobility with inc time and verbal/tactile cues. At inc risk of falls with impaired postural control in standing. Would benefit from moderate intensity therapy upon d/c, will follow  End of Session Patient Position: Bed, 3 rail up, Alarm on  IP OR SWING BED PT PLAN  Inpatient or Swing Bed: Inpatient  PT Plan  Treatment/Interventions: Bed mobility, Transfer training, Gait training, Balance training, Neuromuscular re-education, Strengthening, Endurance training, Therapeutic exercise, Therapeutic activity, Home exercise program, Positioning, Postural re-education  PT Plan: Ongoing PT  PT Frequency: 3 times per week  PT Discharge Recommendations: Moderate intensity level of continued care  PT Recommended Transfer Status: Assist x1  PT - OK to Discharge: Yes (meaning PT eval complete and d/c recommendation made)    Subjective     PT Visit Info:  PT Received On: 06/12/25  General Visit  Information:  General  Reason for Referral: AMS after medication ingestion  Past Medical History Relevant to Rehab: CHF, Afib, MVR (on Warfarin), TIA, BCC, BPH, dementia, depression, HLD, HTN, T2DM, and NPH s/p R occipital  shunt placement by Dr. Melton on 5/15  Family/Caregiver Present: No  Prior to Session Communication: Bedside nurse  Patient Position Received: Bed, 3 rail up, Alarm off, not on at start of session  General Comment: Pt supine in bed, cooperative  Home Living:  Home Living  Type of Home: Apartment  Lives With: Spouse  Home Adaptive Equipment: Walker rolling or standard  Home Layout: One level  Bathroom Shower/Tub: Tub/shower unit  Prior Level of Function:  Prior Function Per Pt/Caregiver Report  Level of Bath: Independent with ADLs and functional transfers (Per EMR from PT eval s/p  shunt in May 2025 Ind at baseline, but requiring assist for past 3 months)  Receives Help From: Family, Home health (per EMR has HHA 2x per week)  Ambulatory Assistance: Independent (with walker)  Precautions:  Precautions  Medical Precautions: Fall precautions             Objective   Pain:  Pain Assessment  Pain Assessment: 0-10  0-10 (Numeric) Pain Score: 0 - No pain  Cognition:  Cognition  Orientation Level: Disoriented to situation  Insight: Mild  Processing Speed: Delayed    General Assessments:                       Sensation  Light Touch: No apparent deficits    Static Sitting Balance  Static Sitting-Balance Support: Feet supported  Static Sitting-Level of Assistance: Close supervision  Static Sitting-Comment/Number of Minutes: stiff posture    Static Standing Balance  Static Standing-Balance Support: Bilateral upper extremity supported  Static Standing-Level of Assistance: Minimum assistance  Static Standing-Comment/Number of Minutes: posterior and R sided lean with significant verbal and tactiel cues to correct  Functional Assessments:  Bed Mobility  Bed Mobility: Yes  Bed Mobility 1  Bed Mobility  1: Supine to sitting  Level of Assistance 1: Moderate assistance, Minimal verbal cues  Bed Mobility Comments 1: pt able to initiate with LEs but unable to bring trunk to upright witohout inc assist from therapist  Bed Mobility 2  Bed Mobility  2: Sitting to supine  Level of Assistance 2: Contact guard    Transfers  Transfer: Yes  Transfer 1  Transfer From 1: Sit to, Stand to  Transfer to 1: Stand, Sit  Technique 1: Sit to stand, Stand to sit  Transfer Device 1: Walker  Transfer Level of Assistance 1: Moderate assistance, Minimal verbal cues  Trials/Comments 1: x2 trials, inc time and effort with multiple attempts prior to reaching upright standing    Ambulation/Gait Training  Ambulation/Gait Training Performed: Yes  Ambulation/Gait Training 1  Surface 1: Level tile  Device 1: Rolling walker  Assistance 1: Minimum assistance, Minimal verbal cues  Quality of Gait 1: Shuffling gait, Decreased step length, Forward flexed posture (inconsistent foot placement with R lateral trunk lean)  Comments/Distance (ft) 1: 2ft along EOB x2    Stairs  Stairs: No  Extremity/Trunk Assessments:  RLE   RLE : Within Functional Limits  LLE   LLE : Within Functional Limits  Outcome Measures:  [facility] Basic Mobility  Turning from your back to your side while in a flat bed without using bedrails: A little  Moving from lying on your back to sitting on the side of a flat bed without using bedrails: A lot  Moving to and from bed to chair (including a wheelchair): A little  Standing up from a chair using your arms (e.g. wheelchair or bedside chair): A lot  To walk in hospital room: A lot  Climbing 3-5 steps with railing: Total  Basic Mobility - Total Score: 13    Encounter Problems       Encounter Problems (Active)       Balance       Pt will maintain static standing with gerard UE support x3 minutes with supervision (Progressing)       Start:  06/12/25    Expected End:  06/26/25               Mobility       Pt will amb 100ft with FWW and supervision  (Progressing)       Start:  06/12/25    Expected End:  06/26/25               PT Transfers       Pt will perform sit<>stand with FWW and SBA (Progressing)       Start:  06/12/25    Expected End:  06/26/25            Pt will perform supine<>sit with supervision (Progressing)       Start:  06/12/25    Expected End:  06/26/25               Pain - Adult              Education Documentation  Precautions, taught by Gem Rodriguez, PT at 6/12/2025  1:49 PM.  Learner: Patient  Readiness: Acceptance  Method: Explanation  Response: Verbalizes Understanding  Comment: goals of session, progression of mobility, fall precautions    Mobility Training, taught by Gem Rodriguez, PT at 6/12/2025  1:49 PM.  Learner: Patient  Readiness: Acceptance  Method: Explanation  Response: Verbalizes Understanding  Comment: goals of session, progression of mobility, fall precautions    Education Comments  No comments found.

## 2025-06-12 NOTE — CARE PLAN
The patient's goals for the shift include    Problem: Heart Failure  Goal: Improved gas exchange this shift  Outcome: Progressing  Goal: Improved urinary output this shift  Outcome: Progressing  Goal: Reduction in peripheral edema within 24 hours  Outcome: Progressing  Goal: Report improvement of dyspnea/breathlessness this shift  Outcome: Progressing  Goal: Weight from fluid excess reduced over 2-3 days, then stabilize  Outcome: Progressing  Goal: Increase self care and/or family involvement in 24 hours  Outcome: Progressing     Problem: Skin  Goal: Decreased wound size/increased tissue granulation at next dressing change  Outcome: Progressing  Goal: Participates in plan/prevention/treatment measures  Outcome: Progressing  Goal: Prevent/manage excess moisture  Outcome: Progressing  Goal: Prevent/minimize sheer/friction injuries  Outcome: Progressing  Goal: Promote/optimize nutrition  Outcome: Progressing  Goal: Promote skin healing  Outcome: Progressing     Problem: Pain - Adult  Goal: Verbalizes/displays adequate comfort level or baseline comfort level  Outcome: Progressing     Problem: Safety - Adult  Goal: Free from fall injury  Outcome: Progressing     Problem: Discharge Planning  Goal: Discharge to home or other facility with appropriate resources  Outcome: Progressing     Problem: Chronic Conditions and Co-morbidities  Goal: Patient's chronic conditions and co-morbidity symptoms are monitored and maintained or improved  Outcome: Progressing     Problem: Nutrition  Goal: Nutrient intake appropriate for maintaining nutritional needs  Outcome: Progressing       The clinical goals for the shift include to get sleep overnight    Over the shift, the patient did make progress toward the goals.

## 2025-06-12 NOTE — CONSULTS
"Nutrition Initial Assessment:   Nutrition Assessment    Reason for Assessment: Admission nursing screening - MST 2 + wounds    Patient is a 75 y.o. male presented to ED as transfer from [place] for AMS after accidentally ingesting wife's pills. Per team, no further drug effect at this time, no further med tox workup, no acute NSGY intervention.     PMHx of CHF, Afib, MVR (on Warfarin), TIA, BCC, BPH, dementia, depression, HLD, HTN, T2DM, and NPH s/p R occipital  shunt placement by Dr. Melton on 5/15.     Nutrition History:  Food and Nutrient History: Pt noted to have baseline confusion and has dementia, no family at bedside. Pt reports his appetite has been \"pretty good\" and eats 100% of 2 meals/day. Pt endorses eating meat, fruits/veg, and \"not too much\" of starches. Pt amenable to try Ensure Max Protein  Vitamin/Herbal Supplement Use: Vit C and D per pt  Food Allergy:  (None per pt)       Anthropometrics:  Height: 170.2 cm (5' 7\")   Weight: 62.6 kg (138 lb)   BMI (Calculated): 21.61  IBW/kg (Dietitian Calculated): 67.3 kg  Percent of IBW: 93 %      Weight History:   Wt Readings from Last 20 Encounters:   06/09/25 62.6 kg (138 lb) - stated   06/09/25 62.6 kg (138 lb)   05/15/25 65.6 kg (144 lb 10 oz)   05/09/25 66 kg (145 lb 9.6 oz)   04/21/25 64.4 kg (141 lb 15.6 oz)   03/10/25 64.4 kg (141 lb 15.6 oz)   02/24/25 64.4 kg (142 lb)   05/11/24 71.7 kg (158 lb)       Weight Change %:  Weight History / % Weight Change: Pt reports previous UBW was 225 lb but could not elaborate on when he last weighed 225 lb (38.7%). Pt's current weight is stated, ?accuracy iso AMS upon admission. If weight is accurate, pt with a 5.2% weight loss x 1 month.  Interpretation of Weight Loss: >5% in 1 month    Nutrition Focused Physical Exam Findings:    Subcutaneous Fat Loss:   Orbital Fat Pads: Mild-Moderate (slight dark circles and slight hollowing)  Buccal Fat Pads: Severe (hollow, sunken and narrow face)  Triceps: Severe " (negligible fat tissue)  Muscle Wasting:  Temporalis: Severe (hollowed scooping depression)  Pectoralis (Clavicular Region): Severe (protruding prominent clavicle)  Deltoid/Trapezius: Severe (squared shoulders, acromion process prominent)  Interosseous: Severe (depressed area between thumb and forefinger)  Quadriceps: Defer  Gastrocnemius: Defer  Edema:  Edema: none  Physical Findings:  Skin: Positive (Surgical wounds to head, abdomen, and umbilicus, unspecified wounds to R foot and heel)  Digestive System Findings:  (None per pt)  Mouth Findings:  (None per pt)    Nutrition Significant Labs:  CBC Trend:   Results from last 7 days   Lab Units 06/12/25  0813 06/11/25  0448 06/10/25  0751 06/09/25  1446   WBC AUTO x10*3/uL 7.5 7.8 7.6 9.5   RBC AUTO x10*6/uL 3.96* 3.78* 4.06* 4.17*   HEMOGLOBIN g/dL 12.0* 11.5* 12.3* 12.9*   HEMATOCRIT % 39.0* 34.9* 38.5* 39.5*   MCV fL 99 92 95 95   PLATELETS AUTO x10*3/uL 193 94* 176 206    , BMP Trend:   Results from last 7 days   Lab Units 06/12/25  0813 06/10/25  0751 06/09/25  1446 06/09/25  1007   GLUCOSE mg/dL 135* 143*  148* 127* 130*   CALCIUM mg/dL 8.6 9.3  9.0 9.1 9.1   SODIUM mmol/L 139 140  141 143 140   POTASSIUM mmol/L 4.1 4.0  4.0 4.0 4.2   CO2 mmol/L 29 26  28 29 30   CHLORIDE mmol/L 104 103  104 105 103   BUN mg/dL 13 13  13 16 19   CREATININE mg/dL 0.62 0.68  0.64 0.62 0.65    , BG POCT trend:   Results from last 7 days   Lab Units 06/12/25  0806 06/11/25  2333 06/11/25  2101 06/11/25  1145 06/11/25  0650   POCT GLUCOSE mg/dL 146* 147* 197* 211* 133*    , Liver Function Trend:   Results from last 7 days   Lab Units 06/09/25  1446 06/09/25  1007   ALK PHOS U/L 79 76   AST U/L 20 26   ALT U/L 40 46   BILIRUBIN TOTAL mg/dL 0.6 0.6    , Renal Lab Trend:   Results from last 7 days   Lab Units 06/12/25  0813 06/10/25  0751 06/09/25  1446 06/09/25  1446 06/09/25  1007   POTASSIUM mmol/L 4.1 4.0  4.0  --  4.0 4.2   PHOSPHORUS mg/dL 3.0 3.5   < >  --   --    SODIUM  "mmol/L 139 140  141  --  143 140   EGFR mL/min/1.73m*2 >90 >90  >90  --  >90 >90   BUN mg/dL 13 13  13  --  16 19   CREATININE mg/dL 0.62 0.68  0.64  --  0.62 0.65    < > = values in this interval not displayed.    , Vit D: No results found for: \"VITD25\"     Nutrition Specific Medications:  Scheduled medications  ascorbic acid, 250 mg, oral, Daily  atorvastatin, 80 mg, oral, Nightly  calcium citrate, 200 mg of elemental calcium, oral, Daily  donepezil, 10 mg, oral, Nightly  ezetimibe, 10 mg, oral, Daily  famotidine, 40 mg, oral, Nightly  ferrous sulfate, 65 mg of elemental iron, oral, Daily  fluticasone, 1 spray, Each Nostril, Daily  insulin lispro, 0-10 Units, subcutaneous, Before meals & nightly  lisinopril, 10 mg, oral, Daily  magnesium oxide, 400 mg of magnesium oxide, oral, Daily  [Held by provider] metFORMIN, 500 mg, oral, BID  metoprolol tartrate, 12.5 mg, oral, BID  minocycline, 100 mg, oral, Daily  polyethylene glycol, 17 g, oral, q12h  sennosides-docusate sodium, 2 tablet, oral, BID  spironolactone, 25 mg, oral, Daily  tamsulosin, 0.4 mg, oral, BID  [Held by provider] warfarin, 2.5 mg, oral, Once per day on Monday Wednesday Friday  [Held by provider] warfarin, 3 mg, oral, Once per day on Sunday Tuesday Thursday Saturday    Continuous medications  Continuous Medications[1]  PRN medications  PRN medications: dextrose, dextrose, diclofenac sodium, glucagon, glucagon      I/O:   Last BM Date: 06/12/25; Stool Appearance: Formed (06/12/25 0300)    Dietary Orders (From admission, onward)       Start     Ordered    06/11/25 2239  May Participate in Room Service With Assistance  ( ROOM SERVICE MAY PARTICIPATE WITH ASSISTANCE)  Once        Question:  .  Answer:  Yes    06/11/25 2238    06/10/25 0841  Adult diet Regular  Diet effective now        Question:  Diet type  Answer:  Regular    06/10/25 0840                     Estimated Needs:   Total Energy Estimated Needs in 24 hours (kCal): 1900 kCal  Method for " Estimating Needs: 30+ kcal/kg CBW  Total Protein Estimated Needs in 24 Hours (g): 75 g  Method for Estimating 24 Hour Protein Needs: 1.2+ g/kg CBW  Total Fluid Estimated Needs in 24 Hours (mL):  (1 ml/kcal or per med team)           Nutrition Diagnosis   Malnutrition Diagnosis  Patient has Malnutrition Diagnosis: Yes  Diagnosis Status: New  Malnutrition Diagnosis: Severe malnutrition related to chronic disease or condition  As Evidenced by: Suspected intake of </=75% EER for >/= 1 month, moderate-severe fat loss and muscle wasting, possible 5.2% weight loss x 1 month    Nutrition Diagnosis  Patient has Nutrition Diagnosis: Yes  Diagnosis Status (1): New  Nutrition Diagnosis 1: Increased nutrient needs  Related to (1): Increased metabolic demand  As Evidenced by (1): Multiple wounds       Nutrition Interventions/Recommendations   Nutrition prescription for oral nutrition    Nutrition Recommendations:    Continue diet as tolerated.   Please obtain updated weight measurement to better assess weight status.   Obtain vit D level iso severe malnutrition and advanced age    Nutrition Interventions/Goals:   Additional Interventions: Ordered Ensure Plus (350 kcal, 13 g pro) TID        Nutrition Monitoring and Evaluation   Estimated Energy Intake: Energy intake greater or equal to 75% of estimated energy needs    Body Weight: Body weight - Promote weight restoration    Electrolyte and Renal Panel: Electrolytes within normal limits  Criteria: WNL  Glucose/Endocrine Profile: Glucose within normal limits ( mg/dL)  Vitamin Profile: Vitamin D, 25 hydroxy    Other: Wound healing    Goal Status: New goal(s) identified    Time Spent (min): 60 minutes            [1]

## 2025-06-12 NOTE — PROGRESS NOTES
06/12/25 1341   Discharge Planning   Living Arrangements Spouse/significant other   Support Systems Spouse/significant other   Assistance Needed ADL's   Type of Residence Private residence   Who is requesting discharge planning? Provider   Home or Post Acute Services Post acute facilities (Rehab/SNF/etc)   Type of Post Acute Facility Services Skilled nursing   Expected Discharge Disposition SNF   Does the patient need discharge transport arranged? Yes   RoundTrip coordination needed? Yes   Financial Resource Strain   How hard is it for you to pay for the very basics like food, housing, medical care, and heating? Not hard   Housing Stability   In the last 12 months, was there a time when you were not able to pay the mortgage or rent on time? N   In the past 12 months, how many times have you moved where you were living? 0   At any time in the past 12 months, were you homeless or living in a shelter (including now)? N   Transportation Needs   In the past 12 months, has lack of transportation kept you from medical appointments or from getting medications? no   In the past 12 months, has lack of transportation kept you from meetings, work, or from getting things needed for daily living? No   Patient Choice   Provider Choice list and CMS website (https://medicare.gov/care-compare#search) for post-acute Quality and Resource Measure Data were provided and reviewed with: Family     TCC ASSESSMENT:  Met with pt and introduced myself as care coordinator and member of the Care Transitions team for discharge planning. Wife Alicja and daughter Eugenia both present at bedside and assisted with answering assessment questions. Pt lives at home with his wife, he was recently discharged to [facility] SN[facility] after  shunt, and family noticed improvement with functional status once he discharged home from SNF. Wife stated pt is in early stages of dementia but it's not bad, however, he accidentally took her medications prior to  "admission and became confused. Wife stated pt uses a walker for assistance with ambulation (daughter stated he was recently able to climb her stairs with railing), and she assists him all ADL's including, getting dressing/showering/meals/medications/transportation. Wife stated pt had 6 falls prior to surgery, none since. Wife feels pt is feels safe at home. Pt's address, phone number, and emergency contact information was verified. Wife denies any social or financial concerns at this time.    Home care: [facility] for SN 1x/week, PT 2x/week, OT 1x/week, and HHA 1x/week (assisted with showering).  DME: Raised toilet seat, reacher, walker, cane, shower chair, rollator, adult diapers.  : None.  PCP: Manpreet Perez MD.   Last appt: Unknown, upcoming appt 6/17/2025.    Transport to appts: Wife provides.   Pharmacy: [facility] [address]. Pt denies issues affording or obtaining medications.     Discharge Planning: Pt presenting with AMS, Neurosurgery team is recommending CT head prior to discharge, no surgery intervention needed at this time. Wife/daughter is requesting PT/OT consult prior to discharge and will decide on home PT/OT vs SNF depending on how pt does with walking. Wife/daughter stated they do NOT want pt to discharge back to [facility] SN[facility] due to the lack of care he received specifically developing a bed sore while at SNF. If pt returns home wife would be okay with resuming home health care thru [facility], she is also requesting to have someone come \"sit\" with the pt daily if possible. Wife stated they probably won't qualify for [place] Medicaid but she did agree to a referral being made to [facility] [employer] [facility] website for [employer] services, referral submitted online with request for [facility] to contact wife for the referral. Referral sent to [facility] home health to confirm which services pt is active for, and if they can accept him for DWIGHT. Lastly, a referral was sent to [facility] to see " if a new raised toilet seat is covered by insurance as wife would like a new raised toilet seat because the one pt has at home is not steady. Care coordinator will continue to follow for discharge planning needs.    Judy Mathew RN  Transitional Care Coordinator (TCC)  [phone] or [phone]

## 2025-06-12 NOTE — PROGRESS NOTES
"Nahid Gastelum is a 75 y.o. male on day 3 of admission presenting with Altered mental status.    Subjective   No acute events overnight, patient feels like improving each day. Report having the best night of sleep     Objective     Physical Exam  AOx3 in no acute distress  Laying in bed comfortably  Breathing comfortably   BUE, BLE 5/5 stength    Last Recorded Vitals  Blood pressure 124/61, pulse 72, temperature 36.8 °C (98.2 °F), resp. rate 16, height 1.702 m (5' 7\"), weight 62.6 kg (138 lb), SpO2 98%.  Intake/Output last 3 Shifts:  I/O last 3 completed shifts:  In: 0 (0 mL/kg)   Out: 700 (11.2 mL/kg) [Urine:700 (0.3 mL/kg/hr)]  Weight: 62.6 kg     Relevant Results  Results for orders placed or performed during the hospital encounter of 06/09/25 (from the past 24 hours)   POCT GLUCOSE   Result Value Ref Range    POCT Glucose 211 (H) 74 - 99 mg/dL   POCT GLUCOSE   Result Value Ref Range    POCT Glucose 197 (H) 74 - 99 mg/dL   POCT GLUCOSE   Result Value Ref Range    POCT Glucose 147 (H) 74 - 99 mg/dL              Assessment & Plan  Altered mental status    Atrial fibrillation (Multi)    CHF (congestive heart failure)    Diabetes mellitus, type 2 (Multi)    Dementia    Gastroesophageal reflux disease    Osteoarthritis of both knees    Mitral valve regurgitation    Hyperlipidemia    DDD (degenerative disc disease), cervical    BPH with urinary obstruction    Benign essential hypertension    Anxiety disorder    Anemia of chronic disease    Cervical spinal stenosis    Accidental overdose    Supratherapeutic INR    NPH (normal pressure hydrocephalus) (Multi)    Nahid Gastelum is a 75M with PMH of CHF, Afib, MVR (on Warfarin), TIA, BCC, BPH, dementia, depression, HLD, HTN, T2DM, and NPH s/p R occipital  shunt placement by Dr. Melton on 5/15. Per family, mental status had been improving after VPS placement. 6/9 presented to ED as transfer from [employer] for AMS after accidentally ingesting wife's pills last night, " including hydrocodone, quetiapine, baclofen, zolpidem, clonazepam, gabapentin. Since then has been difficult to arouse, but based on chart review and per family mental status has been improving. Due to timing of AMS after medication ingestion, and reported improvement of neurologic symptoms after VPS placement, however with increasing confusion during the 2 days prior to medication ingestion. Symptoms caused by medication effect vs problem with shunt. CTH 6/9 with R subdural fluid collection favoring hyrgroma. Certas dialed to 7.     Improving each day, fully oriented.      Plan:  - No acute neurosurgical intervention  - Please obtain CT Head prior to dispo  - Will arrange outpatient follow up with repeat CT Head       Kasi Mccrary MD

## 2025-06-13 LAB
ALBUMIN SERPL BCP-MCNC: 3 G/DL (ref 3.4–5)
ANION GAP SERPL CALC-SCNC: 12 MMOL/L (ref 10–20)
BASOPHILS # BLD AUTO: 0.07 X10*3/UL (ref 0–0.1)
BASOPHILS NFR BLD AUTO: 0.8 %
BUN SERPL-MCNC: 15 MG/DL (ref 6–23)
CALCIUM SERPL-MCNC: 8.7 MG/DL (ref 8.6–10.6)
CHLORIDE SERPL-SCNC: 104 MMOL/L (ref 98–107)
CO2 SERPL-SCNC: 29 MMOL/L (ref 21–32)
CREAT SERPL-MCNC: 0.69 MG/DL (ref 0.5–1.3)
EGFRCR SERPLBLD CKD-EPI 2021: >90 ML/MIN/1.73M*2
EOSINOPHIL # BLD AUTO: 0.19 X10*3/UL (ref 0–0.4)
EOSINOPHIL NFR BLD AUTO: 2.1 %
ERYTHROCYTE [DISTWIDTH] IN BLOOD BY AUTOMATED COUNT: 13.1 % (ref 11.5–14.5)
GLUCOSE BLD MANUAL STRIP-MCNC: 116 MG/DL (ref 74–99)
GLUCOSE BLD MANUAL STRIP-MCNC: 178 MG/DL (ref 74–99)
GLUCOSE BLD MANUAL STRIP-MCNC: 231 MG/DL (ref 74–99)
GLUCOSE SERPL-MCNC: 133 MG/DL (ref 74–99)
HCT VFR BLD AUTO: 37.9 % (ref 41–52)
HGB BLD-MCNC: 11.8 G/DL (ref 13.5–17.5)
IMM GRANULOCYTES # BLD AUTO: 0.08 X10*3/UL (ref 0–0.5)
IMM GRANULOCYTES NFR BLD AUTO: 0.9 % (ref 0–0.9)
INR PPP: 1.3 (ref 0.9–1.1)
LYMPHOCYTES # BLD AUTO: 0.82 X10*3/UL (ref 0.8–3)
LYMPHOCYTES NFR BLD AUTO: 9.1 %
MAGNESIUM SERPL-MCNC: 1.92 MG/DL (ref 1.6–2.4)
MCH RBC QN AUTO: 30.6 PG (ref 26–34)
MCHC RBC AUTO-ENTMCNC: 31.1 G/DL (ref 32–36)
MCV RBC AUTO: 98 FL (ref 80–100)
MONOCYTES # BLD AUTO: 0.81 X10*3/UL (ref 0.05–0.8)
MONOCYTES NFR BLD AUTO: 9 %
NEUTROPHILS # BLD AUTO: 7.06 X10*3/UL (ref 1.6–5.5)
NEUTROPHILS NFR BLD AUTO: 78.1 %
NRBC BLD-RTO: 0 /100 WBCS (ref 0–0)
PHOSPHATE SERPL-MCNC: 3.3 MG/DL (ref 2.5–4.9)
PLATELET # BLD AUTO: 205 X10*3/UL (ref 150–450)
POTASSIUM SERPL-SCNC: 4.4 MMOL/L (ref 3.5–5.3)
PROTHROMBIN TIME: 14.3 SECONDS (ref 9.8–12.4)
RBC # BLD AUTO: 3.86 X10*6/UL (ref 4.5–5.9)
SODIUM SERPL-SCNC: 141 MMOL/L (ref 136–145)
WBC # BLD AUTO: 9 X10*3/UL (ref 4.4–11.3)

## 2025-06-13 PROCEDURE — 84100 ASSAY OF PHOSPHORUS: CPT | Performed by: INTERNAL MEDICINE

## 2025-06-13 PROCEDURE — 2500000004 HC RX 250 GENERAL PHARMACY W/ HCPCS (ALT 636 FOR OP/ED): Performed by: NURSE PRACTITIONER

## 2025-06-13 PROCEDURE — 97116 GAIT TRAINING THERAPY: CPT | Mod: GP,CQ

## 2025-06-13 PROCEDURE — 2500000002 HC RX 250 W HCPCS SELF ADMINISTERED DRUGS (ALT 637 FOR MEDICARE OP, ALT 636 FOR OP/ED): Performed by: NURSE PRACTITIONER

## 2025-06-13 PROCEDURE — 97535 SELF CARE MNGMENT TRAINING: CPT | Mod: GO

## 2025-06-13 PROCEDURE — 97165 OT EVAL LOW COMPLEX 30 MIN: CPT | Mod: GO

## 2025-06-13 PROCEDURE — 1200000002 HC GENERAL ROOM WITH TELEMETRY DAILY

## 2025-06-13 PROCEDURE — 2500000001 HC RX 250 WO HCPCS SELF ADMINISTERED DRUGS (ALT 637 FOR MEDICARE OP): Performed by: NURSE PRACTITIONER

## 2025-06-13 PROCEDURE — 85610 PROTHROMBIN TIME: CPT | Performed by: INTERNAL MEDICINE

## 2025-06-13 PROCEDURE — 82947 ASSAY GLUCOSE BLOOD QUANT: CPT

## 2025-06-13 PROCEDURE — 36415 COLL VENOUS BLD VENIPUNCTURE: CPT | Performed by: INTERNAL MEDICINE

## 2025-06-13 PROCEDURE — 2500000002 HC RX 250 W HCPCS SELF ADMINISTERED DRUGS (ALT 637 FOR MEDICARE OP, ALT 636 FOR OP/ED): Performed by: INTERNAL MEDICINE

## 2025-06-13 PROCEDURE — 99233 SBSQ HOSP IP/OBS HIGH 50: CPT | Performed by: INTERNAL MEDICINE

## 2025-06-13 PROCEDURE — 97530 THERAPEUTIC ACTIVITIES: CPT | Mod: GP,CQ

## 2025-06-13 PROCEDURE — 83735 ASSAY OF MAGNESIUM: CPT | Performed by: INTERNAL MEDICINE

## 2025-06-13 PROCEDURE — 85025 COMPLETE CBC W/AUTO DIFF WBC: CPT | Performed by: INTERNAL MEDICINE

## 2025-06-13 RX ADMIN — ATORVASTATIN CALCIUM 80 MG: 80 TABLET, FILM COATED ORAL at 20:28

## 2025-06-13 RX ADMIN — MAGNESIUM OXIDE TAB 400 MG (241.3 MG ELEMENTAL MG) 1 TABLET: 400 (241.3 MG) TAB at 09:51

## 2025-06-13 RX ADMIN — TAMSULOSIN HYDROCHLORIDE 0.4 MG: 0.4 CAPSULE ORAL at 09:51

## 2025-06-13 RX ADMIN — SENNOSIDES AND DOCUSATE SODIUM 2 TABLET: 50; 8.6 TABLET ORAL at 09:51

## 2025-06-13 RX ADMIN — INSULIN LISPRO 2 UNITS: 100 INJECTION, SOLUTION INTRAVENOUS; SUBCUTANEOUS at 20:25

## 2025-06-13 RX ADMIN — Medication 1 TABLET: at 10:08

## 2025-06-13 RX ADMIN — LISINOPRIL 10 MG: 10 TABLET ORAL at 09:51

## 2025-06-13 RX ADMIN — POLYETHYLENE GLYCOL 3350 17 G: 17 POWDER, FOR SOLUTION ORAL at 09:50

## 2025-06-13 RX ADMIN — WARFARIN SODIUM 2.5 MG: 5 TABLET ORAL at 17:38

## 2025-06-13 RX ADMIN — SENNOSIDES AND DOCUSATE SODIUM 2 TABLET: 50; 8.6 TABLET ORAL at 20:29

## 2025-06-13 RX ADMIN — INSULIN LISPRO 4 UNITS: 100 INJECTION, SOLUTION INTRAVENOUS; SUBCUTANEOUS at 12:42

## 2025-06-13 RX ADMIN — FAMOTIDINE 40 MG: 20 TABLET, FILM COATED ORAL at 20:28

## 2025-06-13 RX ADMIN — TAMSULOSIN HYDROCHLORIDE 0.4 MG: 0.4 CAPSULE ORAL at 20:28

## 2025-06-13 RX ADMIN — METOPROLOL TARTRATE 12.5 MG: 25 TABLET, FILM COATED ORAL at 09:51

## 2025-06-13 RX ADMIN — OXYCODONE HYDROCHLORIDE AND ACETAMINOPHEN 250 MG: 500 TABLET ORAL at 09:51

## 2025-06-13 RX ADMIN — EZETIMIBE 10 MG: 10 TABLET ORAL at 10:08

## 2025-06-13 RX ADMIN — DONEPEZIL HYDROCHLORIDE 10 MG: 10 TABLET, FILM COATED ORAL at 20:28

## 2025-06-13 RX ADMIN — FLUTICASONE PROPIONATE 1 SPRAY: 50 SPRAY, METERED NASAL at 08:29

## 2025-06-13 RX ADMIN — POLYETHYLENE GLYCOL 3350 17 G: 17 POWDER, FOR SOLUTION ORAL at 20:29

## 2025-06-13 RX ADMIN — SPIRONOLACTONE 25 MG: 25 TABLET, FILM COATED ORAL at 09:51

## 2025-06-13 RX ADMIN — FERROUS SULFATE TAB 325 MG (65 MG ELEMENTAL FE) 1 TABLET: 325 (65 FE) TAB at 09:51

## 2025-06-13 RX ADMIN — METOPROLOL TARTRATE 12.5 MG: 25 TABLET, FILM COATED ORAL at 20:28

## 2025-06-13 ASSESSMENT — COGNITIVE AND FUNCTIONAL STATUS - GENERAL
DAILY ACTIVITIY SCORE: 14
CLIMB 3 TO 5 STEPS WITH RAILING: TOTAL
DRESSING REGULAR UPPER BODY CLOTHING: A LITTLE
PERSONAL GROOMING: A LITTLE
DRESSING REGULAR LOWER BODY CLOTHING: A LITTLE
TURNING FROM BACK TO SIDE WHILE IN FLAT BAD: A LITTLE
MOVING TO AND FROM BED TO CHAIR: A LITTLE
MOVING TO AND FROM BED TO CHAIR: A LITTLE
TOILETING: A LOT
TOILETING: A LITTLE
HELP NEEDED FOR BATHING: A LOT
HELP NEEDED FOR BATHING: A LOT
MOVING FROM LYING ON BACK TO SITTING ON SIDE OF FLAT BED WITH BEDRAILS: A LITTLE
DRESSING REGULAR LOWER BODY CLOTHING: A LOT
STANDING UP FROM CHAIR USING ARMS: A LITTLE
WALKING IN HOSPITAL ROOM: A LOT
DRESSING REGULAR UPPER BODY CLOTHING: A LOT
TURNING FROM BACK TO SIDE WHILE IN FLAT BAD: A LOT
CLIMB 3 TO 5 STEPS WITH RAILING: TOTAL
EATING MEALS: A LITTLE
MOBILITY SCORE: 15
DAILY ACTIVITIY SCORE: 18
MOVING FROM LYING ON BACK TO SITTING ON SIDE OF FLAT BED WITH BEDRAILS: A LITTLE
WALKING IN HOSPITAL ROOM: A LITTLE
STANDING UP FROM CHAIR USING ARMS: A LITTLE
PERSONAL GROOMING: A LITTLE
MOBILITY SCORE: 15

## 2025-06-13 ASSESSMENT — ACTIVITIES OF DAILY LIVING (ADL)
BATHING_ASSISTANCE: MODERATE
HOME_MANAGEMENT_TIME_ENTRY: 16

## 2025-06-13 ASSESSMENT — PAIN SCALES - GENERAL
PAINLEVEL_OUTOF10: 0 - NO PAIN
PAINLEVEL_OUTOF10: 6
PAINLEVEL_OUTOF10: 3
PAINLEVEL_OUTOF10: 0 - NO PAIN
PAINLEVEL_OUTOF10: 0 - NO PAIN

## 2025-06-13 ASSESSMENT — PAIN - FUNCTIONAL ASSESSMENT
PAIN_FUNCTIONAL_ASSESSMENT: 0-10

## 2025-06-13 ASSESSMENT — PAIN SCALES - WONG BAKER
WONGBAKER_NUMERICALRESPONSE: NO HURT
WONGBAKER_NUMERICALRESPONSE: NO HURT

## 2025-06-13 NOTE — PROGRESS NOTES
Physical Therapy Treatment    Patient Name: Nahid Gastelum  MRN: [id number]  Today's Date: 6/13/2025  Room: [address] [place] [address]  Time Calculation  Start Time: 1135  Stop Time: 1202  Time Calculation (min): 27 min       Assessment/Plan   PT Assessment  PT Assessment Results: Decreased strength, Decreased endurance, Impaired balance, Decreased mobility, Decreased cognition  Rehab Prognosis: Good  Barriers to Discharge Home: Caregiver assistance, Physical needs  Caregiver Assistance: Caregiver assistance needed per identified barriers - however, level of patient's required assistance exceeds assistance available at home  Physical Needs: Ambulating household distances limited by function/safety, High falls risk due to function or environment  Evaluation/Treatment Tolerance: Patient tolerated treatment well  Strengths: Attitude of self, Support of Caregivers  Barriers to Participation: Comorbidities  End of Session Communication: Bedside nurse  End of Session Patient Position: Bed, 3 rail up, Alarm off, not on at start of session     PT Plan  Treatment/Interventions: Bed mobility, Transfer training, Gait training, Balance training, Neuromuscular re-education, Strengthening, Endurance training, Therapeutic exercise, Therapeutic activity, Home exercise program, Positioning, Postural re-education  PT Plan: Ongoing PT  PT Frequency: 3 times per week  PT Discharge Recommendations: Moderate intensity level of continued care  PT Recommended Transfer Status: Assist x1  PT - OK to Discharge: Yes (meaning PT eval complete and d/c recommendation made)  Assessment: Patient is progressing Well with therapy this date.  Pt requires increased time for bed mobility and transfers. Pt progressing with ambulation from previous session with both distance and level of assist. Would continue to benefit from continued skilled PT to address all mobility deficits; Patient remains appropriate for MOD intensity therapy when medically ready for discharge  from acute stay.  Will continue to follow.   General Visit Information:   PT  Visit  PT Received On: 06/13/25  Prior to Session Communication: Bedside nurse  Patient Position Received: Alarm off, not on at start of session, Bed, 3 rail up  General Comment: Pt supine in bed, pleasant and agreeable to therapy. Wife, daughter and friend present.   Subjective   Subjective: Pt supine in bed upon arrival. Pleasant and agreeable to therapy. Wife, daughter, and family friend present.   Precautions:  Precautions  Medical Precautions: Fall precautions  Vital Signs:   Date/Time Vitals Session Patient Position Pulse Resp SpO2 BP MAP (mmHg)    06/13/25 11:39:34 --  --  81  17  98 %  118/82  94           Objective   Pain:  Pain Assessment  Pain Assessment: 0-10  0-10 (Numeric) Pain Score: 3  Pain Type: Acute pain  Pain Location: Buttocks  Pain Orientation: Right, Left  Cognition:  Cognition  Overall Cognitive Status: Within Functional Limits  Orientation Level: Oriented X4  Attention: Within Functional Limits  Processing Speed: Delayed     Static Sitting balance:  Static Sitting Balance  Static Sitting-Balance Support: Feet supported  Static Sitting-Level of Assistance: Close supervision  Static Sitting-Comment/Number of Minutes: fwd head / rounded shoulders  Static Standing Balance:  Static Standing Balance  Static Standing-Balance Support: Bilateral upper extremity supported  Static Standing-Level of Assistance: Contact guard  Static Standing-Comment/Number of Minutes: wheeled walker, flexed trunk cues for upright posture  Dynamic Sitting Balance:  Dynamic Sitting Balance  Dynamic Sitting-Balance Support: Feet supported, Bilateral upper extremity supported  Dynamic Sitting-Level of Assistance: Close supervision  Dynamic Sitting-Balance: Forward lean (scooting toward HOB)  Dynamic Sitting-Comments: moving toward HOB  Dynamic Standing Balance:  Dynamic Standing Balance  Dynamic Standing-Balance Support: Bilateral upper extremity  supported  Dynamic Standing-Level of Assistance: Contact guard  Dynamic Standing-Balance: Turning  Lines/Tubes/Drains:  External Urinary Catheter Male (Active)   Number of days: 28     PT Treatments:    Bed Mobility  Bed Mobility: Yes  Bed Mobility 1  Bed Mobility 1: Supine to sitting, Sitting to supine  Level of Assistance 1: Contact guard, Moderate verbal cues, Minimal tactile cues  Bed Mobility Comments 1: HOB elevated, verbal and tactile cues for sequencing  Bed Mobility 2  Bed Mobility  2: Scooting  Level of Assistance 2: Maximum assistance  Bed Mobility Comments 2: pt attempted with cues for bridging and scooting self, but unable on own.  Ambulation/Gait Training  Ambulation/Gait Training Performed: Yes  Ambulation/Gait Training 1  Surface 1: Level tile  Device 1: Rolling walker  Assistance 1: Contact guard, Minimal verbal cues  Quality of Gait 1: Shuffling gait, Decreased step length, Forward flexed posture  Comments/Distance (ft) 1: 4' lateral EOB, 8' fwd and backward, 40' x2, cues for upright posture and foot clearance  Transfers  Transfer: Yes  Transfer 1  Transfer From 1: Bed to, Stand to  Transfer to 1: Stand, Bed  Technique 1: Sit to stand, Stand to sit  Transfer Device 1: Walker  Transfer Level of Assistance 1: Minimum assistance  Trials/Comments 1: x4, inc time required to reach upright standing, cues for hand placement          Activity tolerance:  Activity Tolerance  Endurance: Tolerates 10 - 20 min exercise with multiple rests  Outcome Measures:  [facility] Basic Mobility  Turning from your back to your side while in a flat bed without using bedrails: A little  Moving from lying on your back to sitting on the side of a flat bed without using bedrails: A lot  Moving to and from bed to chair (including a wheelchair): A little  Standing up from a chair using your arms (e.g. wheelchair or bedside chair): A little  To walk in hospital room: A little  Climbing 3-5 steps with railing: Total  Basic Mobility -  Total Score: 15         Education Documentation  Precautions, taught by SONNY Cody at 6/13/2025 12:48 PM.  Learner: Patient  Readiness: Acceptance  Method: Explanation  Response: Verbalizes Understanding  Comment: safe mobility with walker, fall prevention    Mobility Training, taught by SONNY Cody at 6/13/2025 12:48 PM.  Learner: Patient  Readiness: Acceptance  Method: Explanation  Response: Verbalizes Understanding  Comment: safe mobility with walker, fall prevention    Education Comments  No comments found.        OP EDUCATION:       Encounter Problems       Encounter Problems (Active)       Balance       Pt will maintain static standing with gerard UE support x3 minutes with supervision (Progressing)       Start:  06/12/25    Expected End:  06/26/25               Mobility       Pt will amb 100ft with FWW and supervision (Progressing)       Start:  06/12/25    Expected End:  06/26/25               PT Transfers       Pt will perform sit<>stand with FWW and SBA (Progressing)       Start:  06/12/25    Expected End:  06/26/25            Pt will perform supine<>sit with supervision (Progressing)       Start:  06/12/25    Expected End:  06/26/25               Pain - Adult            Completion of this session, clinical decision making, and documentation performed under the supervision/direction of Cyndie Cabrera PTA.

## 2025-06-13 NOTE — PROGRESS NOTES
Occupational Therapy    Evaluation    Patient Name: Nahid Gastelum  MRN: [id number]  Today's Date: 6/13/2025  Room: [address] [place] [address]-A  Time Calculation  Start Time: 0904  Stop Time: 0935  Time Calculation (min): 31 min    Assessment  IP OT Assessment  OT Assessment: PATIENT PRESENTS AS A HIGH FALL RISK. HE REQUIRES MOD VERBAL AND TACTILE CUES + MOD A > MAX A FOR UB AND LB ADL TASKS AND TF. HE WOULD BENEFIT FROM IP OT TO ADDRESS FXAL COMPONENT DEFICITS FOR RETURN TO PLOF.  Prognosis: Good  Barriers to Discharge Home: Caregiver assistance, Physical needs  Caregiver Assistance: Caregiver assistance needed per identified barriers - however, level of patient's required assistance exceeds assistance available at home  Physical Needs: Ambulating household distances limited by function/safety, 24hr mobility assistance needed, 24hr ADL assistance needed, High falls risk due to function or environment  Evaluation/Treatment Tolerance: Patient limited by fatigue  Medical Staff Made Aware: Yes  End of Session Communication: Bedside nurse  End of Session Patient Position: Up in chair, Alarm off, caregiver present  Plan:  Inpatient Plan  Treatment Interventions: ADL retraining, Functional transfer training, UE strengthening/ROM, Endurance training, Patient/family training, Equipment evaluation/education, Compensatory technique education  OT Frequency: 3 times per week  OT Discharge Recommendations: Moderate intensity level of continued care  Equipment Recommended upon Discharge:  (TBD AT NEXT LEVEL OF CARE)  OT Recommended Transfer Status: Maximum assist  OT - OK to Discharge: Yes  OT Assessment  OT Assessment Results: Decreased ADL status, Decreased upper extremity range of motion, Decreased upper extremity strength, Decreased endurance, Decreased functional mobility  Prognosis: Good  Evaluation/Treatment Tolerance: Patient limited by fatigue  Medical Staff Made Aware: Yes  Strengths: Attitude of self, Housing layout, Living arrangement  secure, Premorbid level of function, Support of Caregivers  Barriers to Participation: Comorbidities    Subjective   Current Problem:  1. Altered mental status  CT head wo IV contrast      2. Polypharmacy        3. NPH (normal pressure hydrocephalus) (Multi)  CT head wo IV contrast      4. Gait disturbance          General:  Reason for Referral: 75y male presented from [facility] for change in mental status neurosurgery consult for increased confusion after accidentally taking his wife's medication instead of his own.  Past Medical History Relevant to Rehab: CHF, Afib, MVR (on Warfarin), TIA, BCC, BPH, dementia, depression, HLD, HTN, T2DM, and NPH s/p R occipital  shunt placement by Dr. Melton on 5/15  Prior to Session Communication: Bedside nurse  Patient Position Received: Alarm off, not on at start of session, Bed, 3 rail up  Family/Caregiver Present: Yes (wife and dtr entered towards end of session)  Caregiver Feedback: present and supportive  General Comment: Patient met supine in bed. He was pleasant and highly agreeable to full participation.   Precautions:  Hearing/Visual Limitations: appears WFL/glasses  Medical Precautions: Fall precautions  Vital Signs:   Date/Time Vitals Session Patient Position Pulse Resp SpO2 BP MAP (mmHg)    06/13/25 11:39:34 --  --  81  17  98 %  118/82  94           Pain:  Pain Assessment  Pain Assessment: 0-10  0-10 (Numeric) Pain Score: 6  Pain Type: Acute pain  Pain Location: Buttocks  Pain Orientation: Right, Left  Pain Interventions: Repositioned  Response to Interventions: Decrease in pain  Lines/Tubes/Drains:  External Urinary Catheter Male (Active)   Number of days: 28     Objective   Cognition:  Overall Cognitive Status: Within Functional Limits  Orientation Level: Oriented X4  Attention: Within Functional Limits  Home Living:  Type of Home: Apartment  Lives With: Spouse  Home Adaptive Equipment: Reacher, Cane, Walker rolling or standard (tripod walker)  Home  Layout: One level  Home Access: Level entry  Bathroom Shower/Tub: Tub/shower unit  Bathroom Toilet: Adaptive toilet seating  Bathroom Equipment: Raised toilet seat with rails, Shower chair with back, Grab bars in shower   Prior Function:  Level of [place]: Independent with ADLs and functional transfers, Needs assistance with homemaking, Needs assistance with functional transfers  Receives Help From: Home health, Family  ADL Assistance:  (per patient and family reports MOD I for ADL tasks using AE and DME; HH services were initiated for therapy not HHA)  Homemaking Assistance: Needs assistance (per patient and family report TOT A for all IADL tasks including medication management)  Meal Prep: Total  Laundry: Total  Cleaning: Total  Driving/Transportation: Total  Shopping: Total  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 )  Leisure: TV, sports, spending time with family  Hand Dominance: Right  IADL History:  Homemaking Responsibilities: Yes  Meal Prep Responsibility: No  Laundry Responsibility: No  Cleaning Responsibility: No  Bill Paying/Finance Responsibility: No  Shopping Responsibility: No  Occupation: Retired  Type of Occupation: former box   Leisure and Hobbies: TV, sports, time with family  ADL:  Eating Assistance: Stand by  Grooming Assistance: Minimal  Bathing Assistance: Moderate  Bathing Deficit:  (anticipated)  UE Dressing Assistance: Maximal  UE Dressing Deficit: Thread LUE, Pull around back  LE Dressing Assistance: Moderate  LE Dressing Deficit: Thread RLE into pants, Pull up over hips  Toileting Assistance with Device: Maximal  Activity Tolerance:  Endurance: Tolerates 10 - 20 min exercise with multiple rests  Balance:  Dynamic Sitting Balance  Dynamic Sitting-Balance Support: Right upper extremity supported, Left upper extremity supported, Feet supported  Dynamic Sitting-Level of Assistance: Contact guard  Dynamic Sitting-Balance:  Forward lean, Reaching for objects  Static Sitting Balance  Static Sitting-Balance Support: No upper extremity supported, Feet supported  Static Sitting-Level of Assistance: Close supervision  Bed Mobility/Transfers: Bed Mobility  Bed Mobility: Yes  Bed Mobility 1  Bed Mobility 1: Supine to sitting  Level of Assistance 1: Moderate assistance, Minimal tactile cues, Minimal verbal cues  Functional Mobility  Functional Mobility Performed: Yes  Functional Mobility 1  Surface 1: Level tile  Device 1: Rolling walker  Assistance 1: Minimum assistance (AND WALKER SAFETY TRAINING)  Transfers  Transfer: Yes  Transfer 1  Transfer From 1: Sit to, Stand to  Transfer to 1: Stand, Sit  Technique 1: Sit to stand, Stand to sit  Transfer Device 1: Walker  Transfers 2  Transfer From 2: Bed to  Transfer to 2: Chair with arms (AND TOILET)  Transfer Device 2: Walker  Transfer Level of Assistance 2: Minimum assistance (FOR SAFE TRANSITIONING AND BODY MECHANICS)  IADL's:   Homemaking Responsibilities: Yes  Meal Prep Responsibility: No  Laundry Responsibility: No  Cleaning Responsibility: No  Bill Paying/Finance Responsibility: No  Shopping Responsibility: No  Occupation: Retired  Type of Occupation: former box   Leisure and Hobbies: TV, sports, time with family  Vision: Vision - Basic Assessment  Current Vision: Wears glasses all the time  Sensation:  Light Touch: No apparent deficits  Strength:  Strength Comments: BUE 3- > 4- /5  Perception:  Inattention/Neglect: Appears intact  Initiation: Appears intact  Motor Planning: Appears intact  Perseveration: Not present  Hand Function:  Hand Function  Gross Grasp: Functional  Coordination: Functional  Extremities: RUE   RUE : Within Functional Limits, LUE   LUE: Within Functional Limits (GROSSLY 100* SHL FLEXION),  , and      Outcome Measures: [facility] Daily Activity  Putting on and taking off regular lower body clothing: A lot  Bathing (including washing, rinsing, drying): A  lot  Putting on and taking off regular upper body clothing: A lot  Toileting, which includes using toilet, bedpan or urinal: A lot  Taking care of personal grooming such as brushing teeth: A little  Eating Meals: A little  Daily Activity - Total Score: 14  Brief Confusion Assessment Method (bCAM)  CAM Result: Unable to assess      ,     OT Adult Other Outcome Measures  4AT: 0    Education Documentation  Body Mechanics, taught by Jennifer Lainez OT at 6/13/2025 12:11 PM.  Learner: Patient  Readiness: Eager  Method: Explanation  Response: Verbalizes Understanding    Precautions, taught by Jennifer Lainez OT at 6/13/2025 12:11 PM.  Learner: Patient  Readiness: Eager  Method: Explanation  Response: Verbalizes Understanding    ADL Training, taught by Jennifer Lainez OT at 6/13/2025 12:11 PM.  Learner: Patient  Readiness: Eager  Method: Explanation  Response: Verbalizes Understanding    Education Comments  No comments found.        Goals:     Encounter Problems       Encounter Problems (Active)       ADLs       Patient with complete lower body dressing with stand by assist level of assistance donning and doffing all LE clothes  with PRN adaptive equipment while supported sitting and standing       Start:  06/13/25    Expected End:  06/27/25            Patient will feed self with modified independent level of assistance using PRN adaptive equipment.       Start:  06/13/25    Expected End:  06/27/25            Patient will complete daily grooming tasks brushing teeth and washing face/hair with stand by assist level of assistance and PRN adaptive equipment while supported sitting.       Start:  06/13/25    Expected End:  06/27/25            Patient will complete toileting including hygiene clothing management/hygiene with stand by assist level of assistance and shower chair and long-handled sponge.       Start:  06/13/25    Expected End:  06/27/25               BALANCE       Patient will maintain static and  dynamic standing balance during ADL task with stand by assist level of assistance in order to demonstrate decreased risk of falling and improved postural control.       Start:  06/13/25    Expected End:  06/27/25               TRANSFERS       Patient will complete functional transfer with least restrictive device with stand by assist level of assistance.       Start:  06/13/25    Expected End:  06/27/25 06/13/25 at 12:13 PM   Jennifer Lainez, OT   Rehab Office: [phone]

## 2025-06-13 NOTE — PROGRESS NOTES
06/13/25 1117   Rapid Rounds   Attendance Provider;Care Transitions   Expected Discharge Disposition SNF  ([facility])   Today we still await: Placement process   Review at Escalation Rounds No escalation needed     Transitional Care Coordination Progress Note:  Patient discussed during interdisciplinary rounds.  Team members present: MD, HERMAN  Plan per Medical/Surgical team: Per attending pt is medically ready for discharge pending SNF placement. PT is recommending MODERATE intensity therapy post discharge.  Payer: [employer]  Status: Inpatient  Discharge disposition: [facility]  Potential Barriers: None  ADOD: 6/16  Wife/daughter prefers SNF placement vs home PT/OT and provided FOC [facility] who accepted via [employer]. Updated PT/OT notes sent to [facility] and they were asked to submit for precert today. Wife/attending updated of above. Care coordinator will continue to follow for discharge planning needs.     Judy Mathew RN  Transitional Care Coordinator (TCC)  [phone] or [phone]

## 2025-06-13 NOTE — PROGRESS NOTES
Nahid Gastelum is a 75 y.o. male on day 4 of admission presenting with Altered mental status.      Subjective   Patient was seen and examined at bedside. Patient is awake, alert and oriented  Pleasant     Objective     Last Recorded Vitals  /58   Pulse 88   Temp 37 °C (98.6 °F)   Resp 18   Wt 62.6 kg (138 lb)   SpO2 99%   Intake/Output last 3 Shifts:    Intake/Output Summary (Last 24 hours) at 6/13/2025 1927  Last data filed at 6/13/2025 0900  Gross per 24 hour   Intake 300 ml   Output 1200 ml   Net -900 ml       Admission Weight  Weight: 62.6 kg (138 lb) (06/09/25 1429)    Daily Weight  06/09/25 : 62.6 kg (138 lb)    Image Results  CT head wo IV contrast  Narrative: Interpreted By:  Chente Granados and Ohs Zachary   STUDY:  CT HEAD WO IV CONTRAST;  6/12/2025 11:32 pm      INDICATION:  Signs/Symptoms:Encephalopathy/ shutn.      COMPARISON:  CT HEAD WO IV CONTRAST 6/9/2025      ACCESSION NUMBER(S):  [id number]      ORDERING CLINICIAN:  ASHLIE SHAW      TECHNIQUE:  Noncontrast axial CT images of head were obtained with coronal and  sagittal reconstructed images.      FINDINGS:  BRAIN PARENCHYMA: Right posterior approach ventriculostomy catheter  terminates near the foramen of Monro. No acute intraparenchymal  hemorrhage or parenchymal evidence of acute large territory ischemic  infarct. Gray-white matter distinction is preserved.      VENTRICLES and EXTRA-AXIAL SPACES: Similar hypodense fluid collection  along the right cerebral hemisphere measures up to 0.8 cm, similar to  prior exam. There is resulting minimal effacement of the right  cerebral hemisphere and 0.2-0.3 cm leftward midline shift. No acute  extra-axial or intraventricular hemorrhage. The ventricles are  similar in size to prior exam.      PARANASAL SINUSES/MASTOIDS:  No hemorrhage or air-fluid levels within  the visualized paranasal sinuses. The mastoids are well aerated.      CALVARIUM/ORBITS:  No skull fracture. Bilateral lens  replacement. The  orbits and globes are intact to the extent visualized.      EXTRACRANIAL SOFT TISSUES: No discernible abnormality.      Impression: 1. Similar low-density fluid collection layering along the right  cerebral hemisphere consistent with hygroma/chronic hematoma  resulting in similar minimal effacement of the right cerebral  hemisphere and minimal midline shift.  2. Ventriculostomy catheter in place with unchanged size of the  ventricular system.      I personally reviewed the images/study and I agree with the findings  as stated by Dr. Christopher Farley.      MACRO:  None.      Signed by: Chente Granados 2025 7:12 AM  Dictation workstation:   [id number]      Physical Exam  General: sitting in chair   HEENT: moist oral mucosa   Cardiac: Regular rate and rhythm, S1 and S2 present, no rubs, no murmurs, no gallops   Lungs: clear to auscultation bilaterally, no wheezing, no rhonchi  Abdomen: soft, obese, NT ND  EXT: no peripheral edema  Neuro: Aox3. Able to remember his age, , oriented to location, time, year, month and day     Relevant Results  Results for orders placed or performed during the hospital encounter of 25 (from the past 24 hours)   POCT GLUCOSE   Result Value Ref Range    POCT Glucose 143 (H) 74 - 99 mg/dL   CBC and Auto Differential   Result Value Ref Range    WBC 9.0 4.4 - 11.3 x10*3/uL    nRBC 0.0 0.0 - 0.0 /100 WBCs    RBC 3.86 (L) 4.50 - 5.90 x10*6/uL    Hemoglobin 11.8 (L) 13.5 - 17.5 g/dL    Hematocrit 37.9 (L) 41.0 - 52.0 %    MCV 98 80 - 100 fL    MCH 30.6 26.0 - 34.0 pg    MCHC 31.1 (L) 32.0 - 36.0 g/dL    RDW 13.1 11.5 - 14.5 %    Platelets 205 150 - 450 x10*3/uL    Neutrophils % 78.1 40.0 - 80.0 %    Immature Granulocytes %, Automated 0.9 0.0 - 0.9 %    Lymphocytes % 9.1 13.0 - 44.0 %    Monocytes % 9.0 2.0 - 10.0 %    Eosinophils % 2.1 0.0 - 6.0 %    Basophils % 0.8 0.0 - 2.0 %    Neutrophils Absolute 7.06 (H) 1.60 - 5.50 x10*3/uL    Immature Granulocytes Absolute,  Automated 0.08 0.00 - 0.50 x10*3/uL    Lymphocytes Absolute 0.82 0.80 - 3.00 x10*3/uL    Monocytes Absolute 0.81 (H) 0.05 - 0.80 x10*3/uL    Eosinophils Absolute 0.19 0.00 - 0.40 x10*3/uL    Basophils Absolute 0.07 0.00 - 0.10 x10*3/uL   Protime-INR   Result Value Ref Range    Protime 14.3 (H) 9.8 - 12.4 seconds    INR 1.3 (H) 0.9 - 1.1   Renal Function Panel   Result Value Ref Range    Glucose 133 (H) 74 - 99 mg/dL    Sodium 141 136 - 145 mmol/L    Potassium 4.4 3.5 - 5.3 mmol/L    Chloride 104 98 - 107 mmol/L    Bicarbonate 29 21 - 32 mmol/L    Anion Gap 12 10 - 20 mmol/L    Urea Nitrogen 15 6 - 23 mg/dL    Creatinine 0.69 0.50 - 1.30 mg/dL    eGFR >90 >60 mL/min/1.73m*2    Calcium 8.7 8.6 - 10.6 mg/dL    Phosphorus 3.3 2.5 - 4.9 mg/dL    Albumin 3.0 (L) 3.4 - 5.0 g/dL   Magnesium   Result Value Ref Range    Magnesium 1.92 1.60 - 2.40 mg/dL   POCT GLUCOSE   Result Value Ref Range    POCT Glucose 231 (H) 74 - 99 mg/dL   POCT GLUCOSE   Result Value Ref Range    POCT Glucose 116 (H) 74 - 99 mg/dL      Scheduled medications  Scheduled Medications[1]  Continuous medications  Continuous Medications[2]  PRN medications  PRN Medications[3]       Mr Gastelum is a 75y male with PMHx: HTN, HLD, A-fib, MVR, TIA, T2DM, BPH, urinary frequency, dementia, NPH patient presented from [facility] for change in mental status neurosurgery consult.  Patient was recently admitted [facility]  5/15-5/19-he was found to have NPH and a  shunt was placed postoperative he developed change in mental status and was sent to [facility] for rehab--[facility].  Then he was sent to [place] for increased confusion and for accidentally taking his wife's medication instead of his own.  Medication he took of his wife's was hydrocodone, Seroquel, baclofen, Ambien, hydrochloric wine, clonazepam, gabapentin, letrozole and cevimeline.    While in the ED patient's vitals were stable labs were stable WNL troponins were negative.  CT head showed new  right subdural hypodense fluid collection measuring 8 mm sick compressing on the right cerebral hemisphere with slight shift of midline structures to the left.  XR shunt series showed intact  shunt no evidence of fracture.  CXR showed no acute cardiopulmonary process.  Patient to be admitted for altered mental status     Assessment:   Encephalopathy most likely polypharmacy        1. Improved. Back to baseline        2. Med tox signed off      2.  NPH s/p  shunt       1. CT on 6/9 showed some hypodense fluid collection       2. Repeat CT is stable       3. Neurosurgery signed off      3. Atrial Fibrillation/MVR     1. Patient is on lopressor 12.5 mg BID     2. Will continue to hold coumadin until clear by neurosurgery      3. Continue lisinopril 10 mg po daily      4. Continue aldactone 25 mg po daily      5. Continue home atorvastatin 80 mg po daily      4. BPH       1. Continue tamsulosin      5. DM     1. Holding home metformin      2. Continue sliding scale       6. Physical deconditioning      1. PT and OT rec snf         Dispo: medically clear for SNF           Melissa Knight DO           [1] ascorbic acid, 250 mg, oral, Daily  atorvastatin, 80 mg, oral, Nightly  calcium citrate, 200 mg of elemental calcium, oral, Daily  donepezil, 10 mg, oral, Nightly  ezetimibe, 10 mg, oral, Daily  famotidine, 40 mg, oral, Nightly  ferrous sulfate, 65 mg of elemental iron, oral, Daily  fluticasone, 1 spray, Each Nostril, Daily  insulin lispro, 0-10 Units, subcutaneous, Before meals & nightly  lisinopril, 10 mg, oral, Daily  magnesium oxide, 400 mg of magnesium oxide, oral, Daily  [Held by provider] metFORMIN, 500 mg, oral, BID  metoprolol tartrate, 12.5 mg, oral, BID  [Held by provider] minocycline, 100 mg, oral, Daily  polyethylene glycol, 17 g, oral, q12h  sennosides-docusate sodium, 2 tablet, oral, BID  spironolactone, 25 mg, oral, Daily  tamsulosin, 0.4 mg, oral, BID  warfarin, 2.5 mg, oral, Once per day on  Monday Wednesday Friday  warfarin, 3 mg, oral, Once per day on Sunday Tuesday Thursday Saturday  [2]    [3] PRN medications: acetaminophen, dextrose, dextrose, diclofenac sodium, glucagon, glucagon

## 2025-06-13 NOTE — SIGNIFICANT EVENT
Pt is with h/o HTN, HLD, Afib, MVR (on Warfarin), TIA, DM, BCC, urinary frequency/BPH, dementia, NPH, 5/15 s/p R occipital VPS (Certas at 5), CTH cath in position, p/w AMS after ingesting wife's pills, 6/9 CTH decr vents, 11mm R hygroma, SS intact, Certas dialed to 7, 6/12 CTH stable     Pt's exam was stable. CT head from 6/12 reviewed and the hygroma is stable.   No acute neurosurgical intervention or additional neuroimaging needed at this time.     Pt has repeat CT head scheduled for 6/24 and follow up with Dr. Melton on 07/02.    Thank you for allowing us to participate in the care of this patient. Will sign off at this time. Please page [phone] with any questions or concerns.    Khushbu Colvin MD  PGY-2 Neurosurgery  8:24 AM

## 2025-06-13 NOTE — CARE PLAN
The patient's goals for the shift include      The clinical goals for the shift include pt will remain free from fall throughout the shift

## 2025-06-13 NOTE — CARE PLAN
The patient's goals for the shift include    Problem: Heart Failure  Goal: Improved gas exchange this shift  Outcome: Progressing  Goal: Improved urinary output this shift  Outcome: Progressing  Goal: Reduction in peripheral edema within 24 hours  Outcome: Progressing  Goal: Report improvement of dyspnea/breathlessness this shift  Outcome: Progressing  Goal: Weight from fluid excess reduced over 2-3 days, then stabilize  Outcome: Progressing  Goal: Increase self care and/or family involvement in 24 hours  Outcome: Progressing     Problem: Skin  Goal: Decreased wound size/increased tissue granulation at next dressing change  Outcome: Progressing  Goal: Participates in plan/prevention/treatment measures  Outcome: Progressing  Goal: Prevent/manage excess moisture  Outcome: Progressing  Goal: Prevent/minimize sheer/friction injuries  Outcome: Progressing  Goal: Promote/optimize nutrition  Outcome: Progressing  Goal: Promote skin healing  Outcome: Progressing     Problem: Pain - Adult  Goal: Verbalizes/displays adequate comfort level or baseline comfort level  Outcome: Progressing     Problem: Safety - Adult  Goal: Free from fall injury  Outcome: Progressing     Problem: Discharge Planning  Goal: Discharge to home or other facility with appropriate resources  Outcome: Progressing     Problem: Chronic Conditions and Co-morbidities  Goal: Patient's chronic conditions and co-morbidity symptoms are monitored and maintained or improved  Outcome: Progressing     Problem: Nutrition  Goal: Nutrient intake appropriate for maintaining nutritional needs  Outcome: Progressing       The clinical goals for the shift include to get sleep overnight    Over the shift, the patient did make progress toward the above goals.

## 2025-06-14 LAB
ALBUMIN SERPL BCP-MCNC: 3.3 G/DL (ref 3.4–5)
ANION GAP SERPL CALC-SCNC: 13 MMOL/L (ref 10–20)
BUN SERPL-MCNC: 22 MG/DL (ref 6–23)
CALCIUM SERPL-MCNC: 9.5 MG/DL (ref 8.6–10.6)
CHLORIDE SERPL-SCNC: 100 MMOL/L (ref 98–107)
CO2 SERPL-SCNC: 30 MMOL/L (ref 21–32)
CREAT SERPL-MCNC: 0.86 MG/DL (ref 0.5–1.3)
EGFRCR SERPLBLD CKD-EPI 2021: 90 ML/MIN/1.73M*2
ERYTHROCYTE [DISTWIDTH] IN BLOOD BY AUTOMATED COUNT: 13.1 % (ref 11.5–14.5)
GLUCOSE BLD MANUAL STRIP-MCNC: 118 MG/DL (ref 74–99)
GLUCOSE BLD MANUAL STRIP-MCNC: 206 MG/DL (ref 74–99)
GLUCOSE BLD MANUAL STRIP-MCNC: 221 MG/DL (ref 74–99)
GLUCOSE SERPL-MCNC: 175 MG/DL (ref 74–99)
HCT VFR BLD AUTO: 39 % (ref 41–52)
HGB BLD-MCNC: 12.1 G/DL (ref 13.5–17.5)
INR PPP: 1.1 (ref 0.9–1.1)
MAGNESIUM SERPL-MCNC: 1.95 MG/DL (ref 1.6–2.4)
MCH RBC QN AUTO: 31.3 PG (ref 26–34)
MCHC RBC AUTO-ENTMCNC: 31 G/DL (ref 32–36)
MCV RBC AUTO: 101 FL (ref 80–100)
NRBC BLD-RTO: 0 /100 WBCS (ref 0–0)
PHOSPHATE SERPL-MCNC: 3.7 MG/DL (ref 2.5–4.9)
PLATELET # BLD AUTO: 254 X10*3/UL (ref 150–450)
POTASSIUM SERPL-SCNC: 4.4 MMOL/L (ref 3.5–5.3)
PROTHROMBIN TIME: 12.1 SECONDS (ref 9.8–12.4)
RBC # BLD AUTO: 3.87 X10*6/UL (ref 4.5–5.9)
SODIUM SERPL-SCNC: 139 MMOL/L (ref 136–145)
WBC # BLD AUTO: 10.3 X10*3/UL (ref 4.4–11.3)

## 2025-06-14 PROCEDURE — 2500000002 HC RX 250 W HCPCS SELF ADMINISTERED DRUGS (ALT 637 FOR MEDICARE OP, ALT 636 FOR OP/ED): Performed by: INTERNAL MEDICINE

## 2025-06-14 PROCEDURE — 85610 PROTHROMBIN TIME: CPT | Performed by: INTERNAL MEDICINE

## 2025-06-14 PROCEDURE — 36415 COLL VENOUS BLD VENIPUNCTURE: CPT | Performed by: INTERNAL MEDICINE

## 2025-06-14 PROCEDURE — 2500000001 HC RX 250 WO HCPCS SELF ADMINISTERED DRUGS (ALT 637 FOR MEDICARE OP): Performed by: NURSE PRACTITIONER

## 2025-06-14 PROCEDURE — 80069 RENAL FUNCTION PANEL: CPT | Performed by: INTERNAL MEDICINE

## 2025-06-14 PROCEDURE — 85027 COMPLETE CBC AUTOMATED: CPT | Performed by: INTERNAL MEDICINE

## 2025-06-14 PROCEDURE — 2500000004 HC RX 250 GENERAL PHARMACY W/ HCPCS (ALT 636 FOR OP/ED): Performed by: NURSE PRACTITIONER

## 2025-06-14 PROCEDURE — 1100000001 HC PRIVATE ROOM DAILY

## 2025-06-14 PROCEDURE — 82947 ASSAY GLUCOSE BLOOD QUANT: CPT

## 2025-06-14 PROCEDURE — 2500000002 HC RX 250 W HCPCS SELF ADMINISTERED DRUGS (ALT 637 FOR MEDICARE OP, ALT 636 FOR OP/ED): Performed by: NURSE PRACTITIONER

## 2025-06-14 PROCEDURE — 99233 SBSQ HOSP IP/OBS HIGH 50: CPT | Performed by: INTERNAL MEDICINE

## 2025-06-14 PROCEDURE — 83735 ASSAY OF MAGNESIUM: CPT | Performed by: INTERNAL MEDICINE

## 2025-06-14 RX ORDER — METFORMIN HYDROCHLORIDE 500 MG/1
500 TABLET ORAL
Start: 2025-06-14 | End: 2025-06-15 | Stop reason: HOSPADM

## 2025-06-14 RX ORDER — LISINOPRIL 10 MG/1
10 TABLET ORAL DAILY
Start: 2025-06-14

## 2025-06-14 RX ORDER — POLYETHYLENE GLYCOL 3350 17 G/17G
17 POWDER, FOR SOLUTION ORAL EVERY 12 HOURS PRN
Start: 2025-06-14

## 2025-06-14 RX ORDER — HYDROCODONE BITARTRATE AND ACETAMINOPHEN 5; 325 MG/1; MG/1
1 TABLET ORAL EVERY 12 HOURS PRN
Start: 2025-06-14

## 2025-06-14 RX ADMIN — OXYCODONE HYDROCHLORIDE AND ACETAMINOPHEN 250 MG: 500 TABLET ORAL at 08:52

## 2025-06-14 RX ADMIN — SENNOSIDES AND DOCUSATE SODIUM 2 TABLET: 50; 8.6 TABLET ORAL at 21:06

## 2025-06-14 RX ADMIN — POLYETHYLENE GLYCOL 3350 17 G: 17 POWDER, FOR SOLUTION ORAL at 23:31

## 2025-06-14 RX ADMIN — Medication 1 TABLET: at 08:52

## 2025-06-14 RX ADMIN — ATORVASTATIN CALCIUM 80 MG: 80 TABLET, FILM COATED ORAL at 21:06

## 2025-06-14 RX ADMIN — INSULIN LISPRO 4 UNITS: 100 INJECTION, SOLUTION INTRAVENOUS; SUBCUTANEOUS at 21:06

## 2025-06-14 RX ADMIN — METOPROLOL TARTRATE 12.5 MG: 25 TABLET, FILM COATED ORAL at 21:06

## 2025-06-14 RX ADMIN — DONEPEZIL HYDROCHLORIDE 10 MG: 10 TABLET, FILM COATED ORAL at 21:06

## 2025-06-14 RX ADMIN — TAMSULOSIN HYDROCHLORIDE 0.4 MG: 0.4 CAPSULE ORAL at 21:06

## 2025-06-14 RX ADMIN — EZETIMIBE 10 MG: 10 TABLET ORAL at 08:52

## 2025-06-14 RX ADMIN — INSULIN LISPRO 4 UNITS: 100 INJECTION, SOLUTION INTRAVENOUS; SUBCUTANEOUS at 15:11

## 2025-06-14 RX ADMIN — WARFARIN SODIUM 3 MG: 3 TABLET ORAL at 17:12

## 2025-06-14 RX ADMIN — POLYETHYLENE GLYCOL 3350 17 G: 17 POWDER, FOR SOLUTION ORAL at 08:55

## 2025-06-14 RX ADMIN — FERROUS SULFATE TAB 325 MG (65 MG ELEMENTAL FE) 1 TABLET: 325 (65 FE) TAB at 08:52

## 2025-06-14 RX ADMIN — LISINOPRIL 10 MG: 10 TABLET ORAL at 08:52

## 2025-06-14 RX ADMIN — METOPROLOL TARTRATE 12.5 MG: 25 TABLET, FILM COATED ORAL at 08:52

## 2025-06-14 RX ADMIN — MAGNESIUM OXIDE TAB 400 MG (241.3 MG ELEMENTAL MG) 1 TABLET: 400 (241.3 MG) TAB at 08:52

## 2025-06-14 RX ADMIN — FLUTICASONE PROPIONATE 1 SPRAY: 50 SPRAY, METERED NASAL at 08:52

## 2025-06-14 RX ADMIN — FAMOTIDINE 40 MG: 20 TABLET, FILM COATED ORAL at 21:06

## 2025-06-14 RX ADMIN — SPIRONOLACTONE 25 MG: 25 TABLET, FILM COATED ORAL at 08:52

## 2025-06-14 RX ADMIN — TAMSULOSIN HYDROCHLORIDE 0.4 MG: 0.4 CAPSULE ORAL at 08:52

## 2025-06-14 ASSESSMENT — COGNITIVE AND FUNCTIONAL STATUS - GENERAL
HELP NEEDED FOR BATHING: A LOT
MOVING TO AND FROM BED TO CHAIR: A LITTLE
TURNING FROM BACK TO SIDE WHILE IN FLAT BAD: A LITTLE
DAILY ACTIVITIY SCORE: 18
PERSONAL GROOMING: A LITTLE
WALKING IN HOSPITAL ROOM: A LOT
CLIMB 3 TO 5 STEPS WITH RAILING: TOTAL
STANDING UP FROM CHAIR USING ARMS: A LITTLE
DRESSING REGULAR UPPER BODY CLOTHING: A LITTLE
MOBILITY SCORE: 16
TOILETING: A LITTLE
DRESSING REGULAR LOWER BODY CLOTHING: A LITTLE

## 2025-06-14 ASSESSMENT — PAIN SCALES - GENERAL
PAINLEVEL_OUTOF10: 0 - NO PAIN

## 2025-06-14 ASSESSMENT — PAIN - FUNCTIONAL ASSESSMENT
PAIN_FUNCTIONAL_ASSESSMENT: 0-10

## 2025-06-14 NOTE — PROGRESS NOTES
06/14/25 1834   Discharge Planning   Expected Discharge Disposition SNF  ([facility])     Precert to [facility] is still pending at this time. Secure chat sent to direct submit team earlier this afternoon and they were asked to escalate precert. Wife updated multiple times today precert is pending and she will updated once we hear something from insurance. Care coordinator will continue to follow for discharge planning needs.    Judy Mathew RN  Transitional Care Coordinator (TCC)  [phone] or [phone]

## 2025-06-14 NOTE — CARE PLAN
Problem: Heart Failure  Goal: Improved gas exchange this shift  Outcome: Progressing  Flowsheets (Taken 6/13/2025 2117)  Improved gas exchange this shift: Position to promote circulation/maximize ventilation     Problem: Skin  Goal: Participates in plan/prevention/treatment measures  Outcome: Progressing  Flowsheets (Taken 6/13/2025 2117)  Participates in plan/prevention/treatment measures:   Elevate heels   Increase activity/out of bed for meals   Discuss with provider PT/OT consult  Goal: Prevent/manage excess moisture  Outcome: Progressing  Flowsheets (Taken 6/13/2025 2117)  Prevent/manage excess moisture:   Cleanse incontinence/protect with barrier cream   Moisturize dry skin   Monitor for/manage infection if present     Problem: Safety - Adult  Goal: Free from fall injury  Outcome: Progressing  Flowsheets (Taken 6/13/2025 2117)  Free from fall injury: Instruct family/caregiver on patient safety     Problem: Nutrition  Goal: Nutrient intake appropriate for maintaining nutritional needs  Outcome: Progressing  Note: Diet regimen explained. Encouraged small frequent feeding. Offered snacks    The patient's goals for the shift include      The clinical goals for the shift include activity encouraged. strictly q2 turns. fall precaution at all times. re-orients every patient encounter

## 2025-06-14 NOTE — PROGRESS NOTES
Nahid Gastelum is a 75 y.o. male on day 5 of admission presenting with Altered mental status.      Subjective   Patient was seen and examined at bedside. Patient was resting. He was pleasant, interactive and answer questions     Objective     Last Recorded Vitals  /55   Pulse 93   Temp 36.7 °C (98.1 °F)   Resp 18   Wt 62.6 kg (138 lb 0.1 oz)   SpO2 97%   Intake/Output last 3 Shifts:    Intake/Output Summary (Last 24 hours) at 6/14/2025 1655  Last data filed at 6/14/2025 1440  Gross per 24 hour   Intake 740 ml   Output 1520 ml   Net -780 ml       Admission Weight  Weight: 62.6 kg (138 lb) (06/09/25 1429)    Daily Weight  06/14/25 : 62.6 kg (138 lb 0.1 oz)    Image Results  CT head wo IV contrast  Narrative: Interpreted By:  Chente Granados and Ohs Zachary   STUDY:  CT HEAD WO IV CONTRAST;  6/12/2025 11:32 pm      INDICATION:  Signs/Symptoms:Encephalopathy/ shutn.      COMPARISON:  CT HEAD WO IV CONTRAST 6/9/2025      ACCESSION NUMBER(S):  [id number]      ORDERING CLINICIAN:  ASHLIE SHAW      TECHNIQUE:  Noncontrast axial CT images of head were obtained with coronal and  sagittal reconstructed images.      FINDINGS:  BRAIN PARENCHYMA: Right posterior approach ventriculostomy catheter  terminates near the foramen of Monro. No acute intraparenchymal  hemorrhage or parenchymal evidence of acute large territory ischemic  infarct. Gray-white matter distinction is preserved.      VENTRICLES and EXTRA-AXIAL SPACES: Similar hypodense fluid collection  along the right cerebral hemisphere measures up to 0.8 cm, similar to  prior exam. There is resulting minimal effacement of the right  cerebral hemisphere and 0.2-0.3 cm leftward midline shift. No acute  extra-axial or intraventricular hemorrhage. The ventricles are  similar in size to prior exam.      PARANASAL SINUSES/MASTOIDS:  No hemorrhage or air-fluid levels within  the visualized paranasal sinuses. The mastoids are well aerated.       CALVARIUM/ORBITS:  No skull fracture. Bilateral lens replacement. The  orbits and globes are intact to the extent visualized.      EXTRACRANIAL SOFT TISSUES: No discernible abnormality.      Impression: 1. Similar low-density fluid collection layering along the right  cerebral hemisphere consistent with hygroma/chronic hematoma  resulting in similar minimal effacement of the right cerebral  hemisphere and minimal midline shift.  2. Ventriculostomy catheter in place with unchanged size of the  ventricular system.      I personally reviewed the images/study and I agree with the findings  as stated by Dr. Christopher Farley.      MACRO:  None.      Signed by: Chente Granados 2025 7:12 AM  Dictation workstation:   [id number]      Physical Exam  General: sitting in chair   HEENT: moist oral mucosa   Cardiac: Regular rate and rhythm, S1 and S2 present, no rubs, no murmurs, no gallops   Lungs: clear to auscultation bilaterally, no wheezing, no rhonchi  Abdomen: soft, obese, NT ND  EXT: no peripheral edema  Neuro: Aox3. Able to remember his age, , oriented to location, time, year, month and day     Relevant Results  Results for orders placed or performed during the hospital encounter of 25 (from the past 24 hours)   POCT GLUCOSE   Result Value Ref Range    POCT Glucose 178 (H) 74 - 99 mg/dL   Protime-INR   Result Value Ref Range    Protime 12.1 9.8 - 12.4 seconds    INR 1.1 0.9 - 1.1   CBC   Result Value Ref Range    WBC 10.3 4.4 - 11.3 x10*3/uL    nRBC 0.0 0.0 - 0.0 /100 WBCs    RBC 3.87 (L) 4.50 - 5.90 x10*6/uL    Hemoglobin 12.1 (L) 13.5 - 17.5 g/dL    Hematocrit 39.0 (L) 41.0 - 52.0 %     (H) 80 - 100 fL    MCH 31.3 26.0 - 34.0 pg    MCHC 31.0 (L) 32.0 - 36.0 g/dL    RDW 13.1 11.5 - 14.5 %    Platelets 254 150 - 450 x10*3/uL   Renal Function Panel   Result Value Ref Range    Glucose 175 (H) 74 - 99 mg/dL    Sodium 139 136 - 145 mmol/L    Potassium 4.4 3.5 - 5.3 mmol/L    Chloride 100 98 - 107 mmol/L     Bicarbonate 30 21 - 32 mmol/L    Anion Gap 13 10 - 20 mmol/L    Urea Nitrogen 22 6 - 23 mg/dL    Creatinine 0.86 0.50 - 1.30 mg/dL    eGFR 90 >60 mL/min/1.73m*2    Calcium 9.5 8.6 - 10.6 mg/dL    Phosphorus 3.7 2.5 - 4.9 mg/dL    Albumin 3.3 (L) 3.4 - 5.0 g/dL   Magnesium   Result Value Ref Range    Magnesium 1.95 1.60 - 2.40 mg/dL   POCT GLUCOSE   Result Value Ref Range    POCT Glucose 221 (H) 74 - 99 mg/dL   POCT GLUCOSE   Result Value Ref Range    POCT Glucose 118 (H) 74 - 99 mg/dL      Scheduled medications  Scheduled Medications[1]  Continuous medications  Continuous Medications[2]  PRN medications  PRN Medications[3]     Mr Gastelum is a 75y male with PMHx: HTN, HLD, A-fib, MVR, TIA, T2DM, BPH, urinary frequency, dementia, NPH patient presented from [facility] for change in mental status neurosurgery consult.  Patient was recently admitted [facility]  5/15-5/19-he was found to have NPH and a  shunt was placed postoperative he developed change in mental status and was sent to [facility] for rehab--[facility].  Then he was sent to [place] for increased confusion and for accidentally taking his wife's medication instead of his own.  Medication he took of his wife's was hydrocodone, Seroquel, baclofen, Ambien, hydrochloric wine, clonazepam, gabapentin, letrozole and cevimeline.    While in the ED patient's vitals were stable labs were stable WNL troponins were negative.  CT head showed new right subdural hypodense fluid collection measuring 8 mm sick compressing on the right cerebral hemisphere with slight shift of midline structures to the left.  XR shunt series showed intact  shunt no evidence of fracture.  CXR showed no acute cardiopulmonary process.  Patient to be admitted for altered mental status     Assessment:   Encephalopathy most likely polypharmacy        1. Improved. Back to baseline        2. Med tox signed off      2.  NPH s/p  shunt       1. CT on 6/9 showed some hypodense fluid  collection       2. Repeat CT is stable       3. Neurosurgery signed off      3. Atrial Fibrillation/MVR     1. Patient is on lopressor 12.5 mg BID     2. Will continue to hold coumadin until clear by neurosurgery      3. Continue lisinopril 10 mg po daily      4. Continue aldactone 25 mg po daily      5. Continue home atorvastatin 80 mg po daily      4. BPH       1. Continue tamsulosin      5. DM     1. Holding home metformin      2. Continue sliding scale       6. Physical deconditioning      1. PT and OT rec snf         Dispo: medically clear for SNF                 Melissa Knight DO           [1] ascorbic acid, 250 mg, oral, Daily  atorvastatin, 80 mg, oral, Nightly  calcium citrate, 200 mg of elemental calcium, oral, Daily  donepezil, 10 mg, oral, Nightly  ezetimibe, 10 mg, oral, Daily  famotidine, 40 mg, oral, Nightly  ferrous sulfate, 65 mg of elemental iron, oral, Daily  fluticasone, 1 spray, Each Nostril, Daily  insulin lispro, 0-10 Units, subcutaneous, Before meals & nightly  lisinopril, 10 mg, oral, Daily  magnesium oxide, 400 mg of magnesium oxide, oral, Daily  [Held by provider] metFORMIN, 500 mg, oral, BID  metoprolol tartrate, 12.5 mg, oral, BID  [Held by provider] minocycline, 100 mg, oral, Daily  polyethylene glycol, 17 g, oral, q12h  sennosides-docusate sodium, 2 tablet, oral, BID  spironolactone, 25 mg, oral, Daily  tamsulosin, 0.4 mg, oral, BID  warfarin, 2.5 mg, oral, Once per day on Monday Wednesday Friday  warfarin, 3 mg, oral, Once per day on Sunday Tuesday Thursday Saturday  [2]    [3] PRN medications: acetaminophen, dextrose, dextrose, diclofenac sodium, glucagon, glucagon

## 2025-06-14 NOTE — CARE PLAN
The patient's goals for the shift include  N/A    The clinical goals for the shift include patient will remain safe and free from injury entire shift    Problem: Heart Failure  Goal: Improved gas exchange this shift  Outcome: Progressing  Goal: Improved urinary output this shift  Outcome: Progressing  Goal: Reduction in peripheral edema within 24 hours  Outcome: Progressing  Goal: Report improvement of dyspnea/breathlessness this shift  Outcome: Progressing  Goal: Weight from fluid excess reduced over 2-3 days, then stabilize  Outcome: Progressing  Goal: Increase self care and/or family involvement in 24 hours  Outcome: Progressing     Problem: Skin  Goal: Decreased wound size/increased tissue granulation at next dressing change  Outcome: Progressing  Goal: Participates in plan/prevention/treatment measures  Outcome: Progressing  Goal: Prevent/manage excess moisture  Outcome: Progressing  Goal: Prevent/minimize sheer/friction injuries  Outcome: Progressing  Goal: Promote/optimize nutrition  Outcome: Progressing  Goal: Promote skin healing  Outcome: Progressing     Problem: Pain - Adult  Goal: Verbalizes/displays adequate comfort level or baseline comfort level  Outcome: Progressing     Problem: Safety - Adult  Goal: Free from fall injury  Outcome: Progressing     Problem: Discharge Planning  Goal: Discharge to home or other facility with appropriate resources  Outcome: Progressing     Problem: Chronic Conditions and Co-morbidities  Goal: Patient's chronic conditions and co-morbidity symptoms are monitored and maintained or improved  Outcome: Progressing     Problem: Nutrition  Goal: Nutrient intake appropriate for maintaining nutritional needs  Outcome: Progressing

## 2025-06-15 VITALS
DIASTOLIC BLOOD PRESSURE: 80 MMHG | HEIGHT: 67 IN | OXYGEN SATURATION: 99 % | SYSTOLIC BLOOD PRESSURE: 131 MMHG | BODY MASS INDEX: 21.66 KG/M2 | HEART RATE: 95 BPM | TEMPERATURE: 97.5 F | WEIGHT: 138.01 LBS | RESPIRATION RATE: 18 BRPM

## 2025-06-15 LAB
ALBUMIN SERPL BCP-MCNC: 3 G/DL (ref 3.4–5)
ANION GAP SERPL CALC-SCNC: 11 MMOL/L (ref 10–20)
BUN SERPL-MCNC: 22 MG/DL (ref 6–23)
CALCIUM SERPL-MCNC: 9 MG/DL (ref 8.6–10.6)
CHLORIDE SERPL-SCNC: 102 MMOL/L (ref 98–107)
CO2 SERPL-SCNC: 30 MMOL/L (ref 21–32)
CREAT SERPL-MCNC: 0.8 MG/DL (ref 0.5–1.3)
EGFRCR SERPLBLD CKD-EPI 2021: >90 ML/MIN/1.73M*2
ERYTHROCYTE [DISTWIDTH] IN BLOOD BY AUTOMATED COUNT: 12.9 % (ref 11.5–14.5)
GLUCOSE BLD MANUAL STRIP-MCNC: 135 MG/DL (ref 74–99)
GLUCOSE BLD MANUAL STRIP-MCNC: 170 MG/DL (ref 74–99)
GLUCOSE SERPL-MCNC: 117 MG/DL (ref 74–99)
HCT VFR BLD AUTO: 35.9 % (ref 41–52)
HGB BLD-MCNC: 11.1 G/DL (ref 13.5–17.5)
INR PPP: 1.1 (ref 0.9–1.1)
MCH RBC QN AUTO: 30.6 PG (ref 26–34)
MCHC RBC AUTO-ENTMCNC: 30.9 G/DL (ref 32–36)
MCV RBC AUTO: 99 FL (ref 80–100)
NRBC BLD-RTO: 0 /100 WBCS (ref 0–0)
PHOSPHATE SERPL-MCNC: 3.7 MG/DL (ref 2.5–4.9)
PLATELET # BLD AUTO: 222 X10*3/UL (ref 150–450)
POTASSIUM SERPL-SCNC: 4.3 MMOL/L (ref 3.5–5.3)
PROTHROMBIN TIME: 12.5 SECONDS (ref 9.8–12.4)
RBC # BLD AUTO: 3.63 X10*6/UL (ref 4.5–5.9)
SODIUM SERPL-SCNC: 139 MMOL/L (ref 136–145)
WBC # BLD AUTO: 8.5 X10*3/UL (ref 4.4–11.3)

## 2025-06-15 PROCEDURE — 99239 HOSP IP/OBS DSCHRG MGMT >30: CPT | Performed by: INTERNAL MEDICINE

## 2025-06-15 PROCEDURE — 2500000001 HC RX 250 WO HCPCS SELF ADMINISTERED DRUGS (ALT 637 FOR MEDICARE OP): Performed by: NURSE PRACTITIONER

## 2025-06-15 PROCEDURE — 2500000002 HC RX 250 W HCPCS SELF ADMINISTERED DRUGS (ALT 637 FOR MEDICARE OP, ALT 636 FOR OP/ED): Performed by: NURSE PRACTITIONER

## 2025-06-15 PROCEDURE — 2500000004 HC RX 250 GENERAL PHARMACY W/ HCPCS (ALT 636 FOR OP/ED): Performed by: NURSE PRACTITIONER

## 2025-06-15 PROCEDURE — 80069 RENAL FUNCTION PANEL: CPT | Performed by: INTERNAL MEDICINE

## 2025-06-15 PROCEDURE — 85610 PROTHROMBIN TIME: CPT | Performed by: INTERNAL MEDICINE

## 2025-06-15 PROCEDURE — 2500000002 HC RX 250 W HCPCS SELF ADMINISTERED DRUGS (ALT 637 FOR MEDICARE OP, ALT 636 FOR OP/ED): Performed by: INTERNAL MEDICINE

## 2025-06-15 PROCEDURE — 36415 COLL VENOUS BLD VENIPUNCTURE: CPT | Performed by: INTERNAL MEDICINE

## 2025-06-15 PROCEDURE — 85027 COMPLETE CBC AUTOMATED: CPT | Performed by: INTERNAL MEDICINE

## 2025-06-15 PROCEDURE — 82947 ASSAY GLUCOSE BLOOD QUANT: CPT

## 2025-06-15 RX ORDER — INSULIN LISPRO 100 [IU]/ML
0-5 INJECTION, SOLUTION INTRAVENOUS; SUBCUTANEOUS
Start: 2025-06-15

## 2025-06-15 RX ORDER — INSULIN LISPRO 100 [IU]/ML
0-5 INJECTION, SOLUTION INTRAVENOUS; SUBCUTANEOUS
Status: DISCONTINUED | OUTPATIENT
Start: 2025-06-15 | End: 2025-06-15 | Stop reason: HOSPADM

## 2025-06-15 RX ADMIN — POLYETHYLENE GLYCOL 3350 17 G: 17 POWDER, FOR SOLUTION ORAL at 08:13

## 2025-06-15 RX ADMIN — TAMSULOSIN HYDROCHLORIDE 0.4 MG: 0.4 CAPSULE ORAL at 08:04

## 2025-06-15 RX ADMIN — Medication 1 TABLET: at 08:04

## 2025-06-15 RX ADMIN — SPIRONOLACTONE 25 MG: 25 TABLET, FILM COATED ORAL at 08:05

## 2025-06-15 RX ADMIN — OXYCODONE HYDROCHLORIDE AND ACETAMINOPHEN 250 MG: 500 TABLET ORAL at 08:05

## 2025-06-15 RX ADMIN — LISINOPRIL 10 MG: 10 TABLET ORAL at 08:05

## 2025-06-15 RX ADMIN — FERROUS SULFATE TAB 325 MG (65 MG ELEMENTAL FE) 1 TABLET: 325 (65 FE) TAB at 08:05

## 2025-06-15 RX ADMIN — EZETIMIBE 10 MG: 10 TABLET ORAL at 08:05

## 2025-06-15 RX ADMIN — INSULIN LISPRO 1 UNITS: 100 INJECTION, SOLUTION INTRAVENOUS; SUBCUTANEOUS at 13:55

## 2025-06-15 RX ADMIN — MAGNESIUM OXIDE TAB 400 MG (241.3 MG ELEMENTAL MG) 1 TABLET: 400 (241.3 MG) TAB at 08:04

## 2025-06-15 RX ADMIN — FLUTICASONE PROPIONATE 1 SPRAY: 50 SPRAY, METERED NASAL at 08:04

## 2025-06-15 RX ADMIN — METOPROLOL TARTRATE 12.5 MG: 25 TABLET, FILM COATED ORAL at 08:04

## 2025-06-15 ASSESSMENT — PAIN SCALES - GENERAL: PAINLEVEL_OUTOF10: 0 - NO PAIN

## 2025-06-15 NOTE — CARE PLAN
The patient's goals for the shift include  getting approved for SNF    The clinical goals for the shift include pt will remain safe and free from injury throughout entire shift    Problem: Heart Failure  Goal: Improved gas exchange this shift  Outcome: Progressing  Goal: Improved urinary output this shift  Outcome: Progressing  Goal: Reduction in peripheral edema within 24 hours  Outcome: Progressing  Goal: Report improvement of dyspnea/breathlessness this shift  Outcome: Progressing  Goal: Weight from fluid excess reduced over 2-3 days, then stabilize  Outcome: Progressing  Goal: Increase self care and/or family involvement in 24 hours  Outcome: Progressing     Problem: Skin  Goal: Decreased wound size/increased tissue granulation at next dressing change  Outcome: Progressing  Goal: Participates in plan/prevention/treatment measures  Outcome: Progressing  Goal: Prevent/manage excess moisture  Outcome: Progressing  Goal: Prevent/minimize sheer/friction injuries  Outcome: Progressing  Flowsheets (Taken 6/15/2025 1040)  Prevent/minimize sheer/friction injuries:   Increase activity/out of bed for meals   Use pull sheet   Turn/reposition every 2 hours/use positioning/transfer devices  Goal: Promote/optimize nutrition  Outcome: Progressing  Goal: Promote skin healing  Outcome: Progressing     Problem: Pain - Adult  Goal: Verbalizes/displays adequate comfort level or baseline comfort level  Outcome: Progressing     Problem: Safety - Adult  Goal: Free from fall injury  Outcome: Progressing     Problem: Discharge Planning  Goal: Discharge to home or other facility with appropriate resources  Outcome: Progressing     Problem: Chronic Conditions and Co-morbidities  Goal: Patient's chronic conditions and co-morbidity symptoms are monitored and maintained or improved  Outcome: Progressing     Problem: Nutrition  Goal: Nutrient intake appropriate for maintaining nutritional needs

## 2025-06-15 NOTE — DISCHARGE SUMMARY
Discharge Diagnosis  Altered mental status    Malnutrition Diagnosis Status: New  Malnutrition Diagnosis: Severe malnutrition related to chronic disease or condition     As Evidenced by: Suspected intake of </=75% EER for >/= 1 month, moderate-severe fat loss and muscle wasting, possible 5.2% weight loss x 1 month  I agree with the dietitian's malnutrition diagnosis.    Encephalopathy   Polypharmacy   Hx of atrial fibrillation with RVR  Diabetes Mellitus Type 2  NPH s/p  shunt  Physical decondition     Discharge Meds     Medication List      START taking these medications     insulin lispro 100 unit/mL injection; Inject 0-5 Units under the skin 3   times a day before meals. Take as directed per insulin instructions.     CHANGE how you take these medications     HYDROcodone-acetaminophen 5-325 mg tablet; Commonly known as: Norco;   Take 1 tablet by mouth every 12 hours if needed for severe pain (7 - 10).;   What changed: when to take this, reasons to take this   polyethylene glycol 17 gram packet; Commonly known as: Glycolax,   Miralax; Take 17 g by mouth every 12 hours if needed (constipation).; What   changed: when to take this, reasons to take this     CONTINUE taking these medications     acetaminophen 325 mg tablet; Commonly known as: Tylenol; Take 2 tablets   (650 mg) by mouth every 6 hours if needed for mild pain (1 - 3).   ascorbic acid 250 mg tablet; Commonly known as: Vitamin C   atorvastatin 80 mg tablet; Commonly known as: Lipitor   b complex 0.4 mg tablet   calcium citrate 950 mg (200 mg elemental) tablet; Commonly known as:   Calcitrate   donepezil 10 mg tablet; Commonly known as: Aricept   ezetimibe 10 mg tablet; Commonly known as: Zetia   famotidine 40 mg tablet; Commonly known as: Pepcid   ferrous sulfate 325 (65 Fe) mg EC tablet   Flonase Allergy Relief 50 mcg/actuation nasal spray; Generic drug:   fluticasone   lisinopril 10 mg tablet; Take 1 tablet (10 mg) by mouth once daily.   magnesium oxide  400 mg magnesium capsule   metoprolol tartrate 25 mg tablet; Commonly known as: Lopressor   warfarin 4 mg tablet; Commonly known as: Coumadin     STOP taking these medications     diclofenac sodium 1 % gel; Commonly known as: Voltaren   metFORMIN 500 mg tablet; Commonly known as: Glucophage   minocycline 100 mg capsule   sennosides-docusate sodium 8.6-50 mg tablet; Commonly known as:   Hcarley-Colace   spironolactone 25 mg tablet; Commonly known as: Aldactone   tamsulosin 0.4 mg 24 hr capsule; Commonly known as: Flomax   traMADol 50 mg tablet; Commonly known as: Ultra           Hospital Course  Mr Gastelum is a 75y male with PMHx: HTN, HLD, A-fib, MVR, TIA, T2DM, BPH, urinary frequency, dementia, NPH patient presented from [facility] for change in mental status and neurosurgery consult.  Patient was recently admitted [facility]  5/15-5/19-he was found to have NPH and a  shunt was placed postoperative. he was sent to [facility] for rehab--[facility] and discharge.  Then he was sent to [place] for increased confusion and for accidentally taking his wife's medication.  Medication he took of his wife's was hydrocodone, Seroquel, baclofen, Ambien, hydrochloric wine, clonazepam, gabapentin, letrozole and cevimeline.  CT head showed new right subdural hypodense fluid collection measuring 8 mm sick compressing on the right cerebral hemisphere with slight shift of midline structures to the left.  XR shunt series showed intact  shunt no evidence of fracture.  CXR showed no acute cardiopulmonary process.  Medical toxicology was consulted. Patient mental status improved. Neurosurgery requested a new CT and it showed no acute changes or  shunt dislodgement. Patient mental status return to baseline. PT evaluated him and recommend SNF. He is medically stable for SNF    Pertinent Physical Exam At Time of Discharge  General: sitting in bed with no distress   HEENT: moist oral mucosa   Cardiac: Regular rate and rhythm,  S1 and S2 present, no rubs, no murmurs, no gallops   Lungs: clear to auscultation bilaterally, no wheezing, no rhonchi  Abdomen: soft, obese, NT ND  EXT: no peripheral edema  Neuro: Aox3. Able to remember his age, , oriented to location, time, year, month and day    Outpatient Follow-Up  Future Appointments   Date Time Provider Department Center   2025 11:00 AM STJ CT 1 [facility] [place] [facility]   2025 12:30 PM Junito Melton MD [id number] Academic     >35 minutes spent coordinating care     Melissa Knight, DO

## 2025-06-15 NOTE — DISCHARGE INSTRUCTIONS
The patient hemoglobin A1C is 6.3. He is on metformin at home 500 mg every 12 hours  We are sending him sliding scale but can resume metformin once he gets discharge from SNF     Patient is on coumadin for atrial fibrillation, His INR today is 1.1. Please continue coumadin to a goal INR 2-3 and adjust the dose to reach the goal of 2-3.   warfarin (Coumadin) 4 mg tablet 6/8/2025 Spouse/Significant Other, Family Member   Sig: Take 1 tablet (4 mg) by mouth M/W/Th/Sa and take 0.5 tablet (2 mg) by mouth T/F/Su

## 2025-06-15 NOTE — PROGRESS NOTES
06/15/25 1124   Discharge Planning   Home or Post Acute Services Post acute facilities (Rehab/SNF/etc)  ([facility])   Type of Post Acute Facility Services Skilled nursing   Expected Discharge Disposition SNF   Does the patient need discharge transport arranged? Yes   RoundTrip coordination needed? Yes   Has discharge transport been arranged? Yes   What day is the transport expected? 06/15/25   What time is the transport expected? 1500       Patient was approved to [facility]. 7000 complete by the DSC team. Transportation is arranged for 3:00 pm via [facility]. Nurse given number for report. Wife notified of discharge.     Number for report: [phone]    Nitza Gutierrez RN, BSN  Transitional Care Coordinator

## 2025-06-15 NOTE — NURSING NOTE
Discharge note  Pt received discharge instructions and stated he understood with no questions. Iv removed in tact. Report called to Otilia RN at [facility]. Cleaned and placed condom cath on patient for transport. [facility] anticipating  at 1500.  Haylee Ferraro, GHAZALAN, RN, [facility]

## 2025-06-21 DIAGNOSIS — Z51.81 ANTICOAGULATION GOAL OF INR 2 TO 3: ICD-10-CM

## 2025-06-21 DIAGNOSIS — Z79.01 ANTICOAGULATION GOAL OF INR 2 TO 3: ICD-10-CM

## 2025-06-23 ENCOUNTER — APPOINTMENT (OUTPATIENT)
Dept: NEUROSURGERY | Facility: CLINIC | Age: 75
End: 2025-06-23
Payer: MEDICARE

## 2025-06-24 ENCOUNTER — APPOINTMENT (OUTPATIENT)
Dept: RADIOLOGY | Facility: HOSPITAL | Age: 75
End: 2025-06-24
Payer: MEDICARE

## 2025-06-24 DIAGNOSIS — R41.82 ALTERED MENTAL STATUS: ICD-10-CM

## 2025-06-24 DIAGNOSIS — G91.2 NPH (NORMAL PRESSURE HYDROCEPHALUS) (MULTI): ICD-10-CM

## 2025-06-24 PROCEDURE — 70450 CT HEAD/BRAIN W/O DYE: CPT | Performed by: RADIOLOGY

## 2025-06-24 PROCEDURE — 70450 CT HEAD/BRAIN W/O DYE: CPT

## 2025-07-02 ENCOUNTER — APPOINTMENT (OUTPATIENT)
Dept: NEUROSURGERY | Facility: HOSPITAL | Age: 75
End: 2025-07-02
Payer: MEDICARE

## 2025-07-02 VITALS
BODY MASS INDEX: 21.61 KG/M2 | HEART RATE: 96 BPM | WEIGHT: 138 LBS | DIASTOLIC BLOOD PRESSURE: 78 MMHG | SYSTOLIC BLOOD PRESSURE: 125 MMHG | RESPIRATION RATE: 18 BRPM

## 2025-07-02 DIAGNOSIS — G91.2 NPH (NORMAL PRESSURE HYDROCEPHALUS) (MULTI): ICD-10-CM

## 2025-07-02 PROCEDURE — 62252 CSF SHUNT REPROGRAM: CPT | Performed by: NEUROLOGICAL SURGERY

## 2025-07-02 PROCEDURE — 99211 OFF/OP EST MAY X REQ PHY/QHP: CPT | Performed by: NEUROLOGICAL SURGERY

## 2025-07-02 ASSESSMENT — PAIN SCALES - GENERAL: PAINLEVEL_OUTOF10: 5

## 2025-07-02 NOTE — PROGRESS NOTES
5-15-25  Shunt , 6-9 hygroma shunt at #7. Today is a little better than before. He shakes a lot. Is at a rehab.    75-year-old man presents for evaluation of normal pressure hydrocephalus.  The patient recently had a shunt placed.  Flow to the shunt was decreased because the patient developed subdural hygromas.  The patient is still having significant problems with mobility and psychomotor slowing.    On examination, the patient is alert and interactive but has significant psychomotor slowing.  His incisions are clean and intact.  Interrogation of the shunt demonstrates that is still at setting #7.    The patient is healing well from his surgery however has had worsening of his symptoms since fourth of the shunt was decreased.  We will slightly increased flow through the shunt to see if this helps.  I have placed it at setting #6.  We will obtain a new head CT in 2 weeks to ensure that he does not have recurrence of his subdural hygroma.  I do not believe his shunt should be opened any further.

## 2025-07-16 ENCOUNTER — APPOINTMENT (OUTPATIENT)
Dept: RADIOLOGY | Facility: HOSPITAL | Age: 75
End: 2025-07-16
Payer: MEDICARE

## 2025-07-23 ENCOUNTER — HOSPITAL ENCOUNTER (OUTPATIENT)
Dept: RADIOLOGY | Facility: HOSPITAL | Age: 75
Discharge: HOME | End: 2025-07-23
Payer: MEDICARE

## 2025-07-23 DIAGNOSIS — G91.2 NPH (NORMAL PRESSURE HYDROCEPHALUS) (MULTI): ICD-10-CM

## 2025-07-23 PROCEDURE — 70450 CT HEAD/BRAIN W/O DYE: CPT | Performed by: RADIOLOGY

## 2025-07-23 PROCEDURE — 70450 CT HEAD/BRAIN W/O DYE: CPT

## (undated) DEVICE — SEALANT, HEMOSTATIC, FLOSEAL, 10 ML

## (undated) DEVICE — TROCAR, KII OPTICAL BLADELESS 5MM Z THREAD 100MM LNGTH

## (undated) DEVICE — DRESSING, GAUZE, 16 PLY, 4 X 4 IN, STERILE

## (undated) DEVICE — SUTURE, SILK, 2-0, 18 IN, BLACK

## (undated) DEVICE — DRAPE, SHEET, 17 X 23 IN

## (undated) DEVICE — GOWN, SURGICAL, SMARTGOWN, XLARGE, STERILE

## (undated) DEVICE — INTRODUCER KIT, PERCUTANEOUS, 10 FR

## (undated) DEVICE — MANIFOLD, 4 PORT NEPTUNE STANDARD

## (undated) DEVICE — Device

## (undated) DEVICE — TUBING, SUCTION, CONNECTING, STERILE 0.25 X 120 IN., LF

## (undated) DEVICE — DRESSING, ISLAND, TELFA, 4 X 5 IN

## (undated) DEVICE — TROCAR SYSTEM, BALLOON, KII GELPORT, 12 X 100MM

## (undated) DEVICE — ADHESIVE, SKIN, DERMABOND ADVANCED, 15CM, PEN-STYLE

## (undated) DEVICE — MARKER, SKIN, RULER AND LABEL PACK, CUSTOM

## (undated) DEVICE — SUTURE, VICRYL, 4-0, 27IN, RB-1

## (undated) DEVICE — COVER, TABLE, UHC

## (undated) DEVICE — PAD, GROUNDING, ELECTROSURGICAL, W/9 FT CABLE, POLYHESIVE II, ADULT, LF

## (undated) DEVICE — DRAPE, IRRIGATION, W/POUCH, ADHESIVE STRIP, STERI DRAPE, 19 X 23 IN, DISPOSABLE, STERILE

## (undated) DEVICE — REST, HEAD, BAGEL, 9 IN

## (undated) DEVICE — APPLICATOR, PREP, CHLORAPREP, W/ORANGE TINT, 10.5ML

## (undated) DEVICE — CATHETER, RED RUBBER 14FR

## (undated) DEVICE — SUTURE, SILK, 0 PERMA HAND, BR, SUTUPAK, 30IN, BLACK

## (undated) DEVICE — ADHESIVE, SKIN, MASTISOL, 2/3 CC VIAL

## (undated) DEVICE — APPLICATOR, CHLORAPREP, W/ORANGE TINT, 26ML

## (undated) DEVICE — BUR, PERFORATOR, CRANIAL, ACRA-CUT 14/11MM, CDM

## (undated) DEVICE — PREP, SKIN, DURAPREP, 6 CC

## (undated) DEVICE — PREP, IODOPHOR, W/ALCOHOL, DURAPREP, W/APPLICATOR, 26 CC

## (undated) DEVICE — ADAPTER, LUER STUB, 16 G

## (undated) DEVICE — DRESSING, ADHESIVE, ISLAND, TELFA, 2 X 3.75 IN, LF

## (undated) DEVICE — COVER, CART, 45 X 27 X 48 IN, CLEAR

## (undated) DEVICE — DRAPE, INCISE, ANTIMICROBIAL, IOBAN 2, STERI DRAPE, 23 X 33 IN, DISPOSABLE, STERILE

## (undated) DEVICE — DRAPE, TIBURON, SPLIT SHEET, REINF ADH STRIP, 77X122

## (undated) DEVICE — SUTURE, MONOCRYL, 4-0, 18 IN, PS2, UNDYED

## (undated) DEVICE — BOOT, SUTURE-AID, YELLOW, STERILE, LF

## (undated) DEVICE — STYLET, AXIEM STEALTH NAVIGATION

## (undated) DEVICE — BAG, DECANTER

## (undated) DEVICE — TUBE SET, PNEUMOCLEAR, SMOKE EVACU, HIGH-FLOW

## (undated) DEVICE — SUTURE, VICRYL, 0, 27 IN, UR-6, VIOLET

## (undated) DEVICE — TROCAR, OPTICAL, BLADELESS, 12MM, THREADED, 100MM LENGTH

## (undated) DEVICE — BUR, ACORN 6MM PRECISION

## (undated) DEVICE — CATHETER, URETHRAL, ROBNEL,  8 FR, 16 IN, LF, RED

## (undated) DEVICE — SOLUTION, INJECTION, USP, SODIUM CHLORIDE 0.9%, .9 NACL, 250 ML, BAG

## (undated) DEVICE — TRACKER, STINGRAY, NON-INVASIVE, AXIEM STEALTH NAVIGATION, NEURO

## (undated) DEVICE — ANTIFOG, SOLUTION, FOG-OUT